# Patient Record
Sex: FEMALE | Race: WHITE | NOT HISPANIC OR LATINO | Employment: OTHER | URBAN - METROPOLITAN AREA
[De-identification: names, ages, dates, MRNs, and addresses within clinical notes are randomized per-mention and may not be internally consistent; named-entity substitution may affect disease eponyms.]

---

## 2017-11-01 ENCOUNTER — OFFICE VISIT (OUTPATIENT)
Dept: ORTHOPEDIC SURGERY | Facility: CLINIC | Age: 67
End: 2017-11-01

## 2017-11-01 DIAGNOSIS — G89.29 CHRONIC PAIN OF BOTH KNEES: Primary | ICD-10-CM

## 2017-11-01 DIAGNOSIS — M25.512 CHRONIC PAIN OF BOTH SHOULDERS: ICD-10-CM

## 2017-11-01 DIAGNOSIS — M25.562 CHRONIC PAIN OF BOTH KNEES: Primary | ICD-10-CM

## 2017-11-01 DIAGNOSIS — G89.29 CHRONIC PAIN OF BOTH SHOULDERS: ICD-10-CM

## 2017-11-01 DIAGNOSIS — M25.511 CHRONIC PAIN OF BOTH SHOULDERS: ICD-10-CM

## 2017-11-01 DIAGNOSIS — M25.561 CHRONIC PAIN OF BOTH KNEES: Primary | ICD-10-CM

## 2017-11-01 PROCEDURE — 73030 X-RAY EXAM OF SHOULDER: CPT | Performed by: ORTHOPAEDIC SURGERY

## 2017-11-01 PROCEDURE — 99204 OFFICE O/P NEW MOD 45 MIN: CPT | Performed by: ORTHOPAEDIC SURGERY

## 2017-11-01 NOTE — PROGRESS NOTES
Chief Complaint   Patient presents with   • Left Shoulder - Establish Care   • Right Shoulder - Establish Care     Chief complaint: Patient is complaining of bilateral shoulder pain and discomfort.  She is also complaining of bilateral knee pain and discomfort.        HPI The patient is here today for right and shoulder pain and discomfort.  Ration states that she's been having pain and discomfort in both shoulders for about 6 years.  The symptoms are progressively worse on the left side over the past 2-3 months.  She describes her pain as a constantly present dull ache which never really goes away.  She has recently experienced a sharp stabbing character to the pain with difficulty in abduction.  She has difficulty in sleeping in bed at night.  She does recall that she was working out in the gym on a rowing machine and since that time her left shoulder has become a lot worse.  She finds it difficult to abduct her arm at the shoulder and to hold it in that position for any length of time.  Patient is also complaining of bilateral knee pain.  She has difficulty going up and down the steps.  Occasionally the knees will swell and then she finds it difficult to squat on the ground.  Walking on an inclined surface is painful for the patient.  She has a sense of early morning stiffness with both her knees.  At this point she states that she is involved in an exercise program which is helping her knees.  She cannot walk briskly or run because of the knee pain.  She would like to continue with conservative, nonoperative care both the knees.           No Known Allergies      Social History     Social History   • Marital status:      Spouse name: N/A   • Number of children: N/A   • Years of education: N/A     Occupational History   • Not on file.     Social History Main Topics   • Smoking status: Former Smoker   • Smokeless tobacco: Not on file   • Alcohol use 3.6 oz/week     6 Glasses of wine per week      Comment: 6  days per week   • Drug use: Not on file   • Sexual activity: Not on file     Other Topics Concern   • Not on file     Social History Narrative   • No narrative on file       Family History   Problem Relation Age of Onset   • No Known Problems Mother    • No Known Problems Father        Past Surgical History:   Procedure Laterality Date   • COMBINED HYSTERECTOMY VAGINAL / OOPHORECTOMY / A&P REPAIR     • MASTECTOMY Bilateral 2007       Past Medical History:   Diagnosis Date   • Breast cancer 2002, 2007    Lumpectomy with radiation and chemotherapy on the left followed by bilateral mastectomy 2007           There were no vitals filed for this visit.          Review of Systems   Constitutional: Negative.    HENT: Negative.    Eyes: Negative.    Respiratory: Negative.    Cardiovascular: Positive for leg swelling.   Gastrointestinal: Negative.    Endocrine: Negative.    Genitourinary: Negative.    Musculoskeletal: Positive for gait problem and joint swelling.   Skin: Negative.    Allergic/Immunologic: Negative.    Hematological: Negative.    Psychiatric/Behavioral: Negative.            Physical Exam   Constitutional: She is oriented to person, place, and time. She appears well-developed.   HENT:   Head: Normocephalic.   Eyes: Conjunctivae are normal. Pupils are equal, round, and reactive to light.   Neck: Neck supple.   Cardiovascular: Normal rate and intact distal pulses.    Pulmonary/Chest: Breath sounds normal.   Abdominal: Soft. Bowel sounds are normal.   Musculoskeletal: She exhibits edema and tenderness. She exhibits no deformity.   Neurological: She is alert and oriented to person, place, and time. She has normal reflexes.   Skin: Skin is dry.   Psychiatric: She has a normal mood and affect. Her behavior is normal. Judgment and thought content normal.   Nursing note and vitals reviewed.              Joint/Body Part Specific Exam:  bilateral shoulder. The shoulder is extremely tender anteriorly. There is tenderness  over the insertion of the rotator cuff over the greater tuberosity of the humerus. Slight proximal migration of the humeral head is noted. AC joint is tender. Crossover adduction test is positive. Drop arm sign is positive. Forward flexion is 0-120° degrees, abduction is 0-140° degrees, external rotation is 0-30° degrees. Patient has mild atrophy both of the supra and infraspinatus fossa. Sulcus sign is negative. There is no evidence of multidirectional instability. Neer test is positive on compression. Skin and soft tissue tenderness is noted. Patient’s strength in abduction and external rotation are extremely lacking. The pain level is 6.  There are no signs of multidirectional instability.  Apprehension sign is negative.  His symptoms are much worse on the left side compared to the right side.    bilateral knee. Patient has crepitus throughout range of motion. Positive patellar grind test. Mild effusion. Lachman is negative. Pivot shift is negative. Anterior and posterior drawer signs are negative. Significant joint line tenderness is noted on the medial aspect of the knee. Patient has a varus orientation of the knee. Range of motion in flexion is for 0- 120° degrees. Neurovascular status intact.  Dorsalis pedis and posterior tibial artery pulses are palpable. Common peroneal nerve function is well preserved. Patients gait is cautious and antalgic. Skin and soft tissues are swollen; consistent with synovitis and effusion.  Patient has pain and discomfort when she first starts to walk and gets out of the chair from a seated position.      X-RAY Report:    bilateral Shoulder X-Ray  Indication: Pain with limited range of motion both shoulders after an injury to the left shoulder and the gym.  AP and Lateral  Findings: Narrowing of the acromioclavicular joint space with sclerosis over the greater tuberosity of the humerus  no bony lesion  Soft tissues within normal limits  decreased joint spaces  Hardware  appropriately positioned not applicable      no prior studies available for comparison.    X-RAY was ordered and reviewed by Dean Trinidad MD            Diagnostics:            Prema was seen today for establish care and establish care.    Diagnoses and all orders for this visit:    Chronic pain of both knees  -     Cancel: XR Knee 3 View Bilateral    Chronic pain of both shoulders  -     XR Shoulder 2+ View Bilateral  -     MRI Shoulder Left Without Contrast; Future            Procedures          I provided this patient with educational materials regarding exercise and bone health.        Plan:   Stretching and strengthening exercises of the quads and the hamstrings to improve the range of motion and the strength in both knees.    Use a supportive brace to the knee to prevent it from buckling and giving out.    Tablet calcium and vitamin D for bone health.    Tablet ibuprofen 600 mg orally twice a day for pain and discomfort.    Schedule an MRI of the left shoulder for evaluation of intra-articular pathology such as a rotator cuff tear.    Once the MRI has been obtained will be able to make a decision for the internal surgical intervention.  If she does have a rotator cuff tear or a glenoid labrum tear she might be a candidate for an arthroscopic surgery and repair.    If she has only a partial thickness tear or signs of impingement on the MRI then she can be treated with conservative, nonoperative care.    She is not a candidate for total knee arthroplasty at this point in time to treat her with nonoperative care for the knee and discomfort.    Follow-up in my office in 3-4 weeks for reevaluation and discussion with the patient about the reports of the MRI.            CC To No Known Provider

## 2017-11-07 ENCOUNTER — TELEPHONE (OUTPATIENT)
Dept: ORTHOPEDIC SURGERY | Facility: CLINIC | Age: 67
End: 2017-11-07

## 2017-11-22 ENCOUNTER — OFFICE VISIT (OUTPATIENT)
Dept: ORTHOPEDIC SURGERY | Facility: CLINIC | Age: 67
End: 2017-11-22

## 2017-11-22 VITALS — HEIGHT: 65 IN | TEMPERATURE: 98.4 F | BODY MASS INDEX: 25.99 KG/M2 | WEIGHT: 156 LBS

## 2017-11-22 DIAGNOSIS — M25.511 CHRONIC PAIN OF BOTH SHOULDERS: Primary | ICD-10-CM

## 2017-11-22 DIAGNOSIS — G89.29 CHRONIC PAIN OF BOTH SHOULDERS: Primary | ICD-10-CM

## 2017-11-22 DIAGNOSIS — M25.512 CHRONIC PAIN OF BOTH SHOULDERS: Primary | ICD-10-CM

## 2017-11-22 PROCEDURE — 99213 OFFICE O/P EST LOW 20 MIN: CPT | Performed by: ORTHOPAEDIC SURGERY

## 2017-11-22 PROCEDURE — 20610 DRAIN/INJ JOINT/BURSA W/O US: CPT | Performed by: ORTHOPAEDIC SURGERY

## 2017-11-22 RX ORDER — ESOMEPRAZOLE MAGNESIUM 40 MG/1
40 CAPSULE, DELAYED RELEASE ORAL
COMMUNITY
Start: 2013-10-12 | End: 2022-08-29 | Stop reason: ALTCHOICE

## 2017-11-22 RX ORDER — LEVOCETIRIZINE DIHYDROCHLORIDE 5 MG/1
TABLET, FILM COATED ORAL
Refills: 2 | COMMUNITY
Start: 2017-10-24 | End: 2020-08-20

## 2017-11-22 RX ADMIN — LIDOCAINE HYDROCHLORIDE 2 ML: 10 INJECTION, SOLUTION INFILTRATION; PERINEURAL at 09:34

## 2017-11-22 RX ADMIN — METHYLPREDNISOLONE ACETATE 160 MG: 80 INJECTION, SUSPENSION INTRA-ARTICULAR; INTRALESIONAL; INTRAMUSCULAR; SOFT TISSUE at 09:34

## 2017-11-22 RX ADMIN — METHYLPREDNISOLONE ACETATE 160 MG: 80 INJECTION, SUSPENSION INTRA-ARTICULAR; INTRALESIONAL; INTRAMUSCULAR; SOFT TISSUE at 09:31

## 2017-11-22 RX ADMIN — LIDOCAINE HYDROCHLORIDE 2 ML: 10 INJECTION, SOLUTION INFILTRATION; PERINEURAL at 09:31

## 2017-11-22 NOTE — PROGRESS NOTES
Chief Complaint   Patient presents with   • Left Shoulder - Follow-up   • Right Shoulder - Follow-up           HPI patient presents to the office today for follow-up on bilateral shoulder pain. MRI results of the left shoulder. Patient is having increasing pain and discomfort in both shoulders.  She has weakness in abduction with difficulty in sleeping in bed at night.  Cross body activities bother her significantly.  She states that carrying heavy weights such as full grocery bags, bother her significantly.  She has had a cervical spine fusion by Dr. William Whiting.  She states that there is some overlap all her symptoms from the C-spine into both her shoulders.  She states that her quality of life is quite negative at this point and she would like some relief on her shoulder symptoms.  She is also complaining of locking and triggering of the right third and fourth digits.  I have discussed the pathology of locking trigger digit with the patient.  The decision has been made to undergo surgical release of the A1 pulley of the third and fourth digits on the right side.  The patient is going to think about it and let me know when she wants to proceed with a surgical intervention.        Vitals:    11/22/17 0916   Temp: 98.4 °F (36.9 °C)           Review of Systems   Constitutional: Negative.    HENT: Negative.    Eyes: Negative.    Respiratory: Negative.    Cardiovascular: Negative.    Gastrointestinal: Negative.    Endocrine: Negative.    Genitourinary: Negative.    Musculoskeletal: Negative.    Skin: Negative.    Allergic/Immunologic: Negative.    Neurological: Negative.    Hematological: Negative.    Psychiatric/Behavioral: Negative.            Physical Exam   Constitutional: She appears well-nourished.   HENT:   Head: Atraumatic.   Eyes: EOM are normal.   Neck: Neck supple.   Cardiovascular: Normal rate, regular rhythm, normal heart sounds and intact distal pulses.    Pulmonary/Chest: Effort normal and breath sounds  normal.   Abdominal: Bowel sounds are normal.   Musculoskeletal: She exhibits edema, tenderness and deformity.   Neurological: She is alert. She has normal reflexes.   Skin: Skin is dry.   Psychiatric: She has a normal mood and affect. Her behavior is normal. Judgment and thought content normal.   Nursing note and vitals reviewed.          Joint/Body Part Specific Exam:  bilateral Shoulder. Patient has signs of impingement with internal and external rotation. There is a lot of pain and tenderness for the patient over the subcromial bursa. Hodgson’s sign is positive. Neer sign is positive. Forward flexion is 0-110° degrees, abduction is 0-120° degrees, external rotation is 0-30° degrees. Rotator cuff function is fairly well preserved except for the impingement at 90 degrees. Apprehension sign is negative. Axillary nerve function is well preserved. Radial artery pulses are palpable. There is no evidence of multidirectional instability. Sulcus sign is negative. Drop arm sign is negative. The patient has some ill-defined tenderness over the greater tuberosity of the humerus. The pain level is 6.  The symptoms are much worse on the left side compared to the right side.  She has a lot of tenderness over the lateral end of the clavicle and over the footprint of the rotator cuff on the greater tuberosity of the humerus.    right wrist. Trigger right third and fourth digits. Skin is swollen over volar aspect of MCP joint. Flexor and extensor tendon functions are both well maintained. Tender nodule over the volar aspect of MCP joint. Capillary refill is two seconds with a brisk return. Triggering is passively correctable. Median nerve function is normal. Radial artery pulse is palpable.  X-RAY Report:        Diagnostics:  MRI reports from [facility] and opened MRI shows a partial thickness tear on the bursal surface of the distal supraspinatus involving 50% of the tendon thickness.  There is some subacromial bursitis.  There  is moderate acromioclavicular joint arthritis.  There is some tendinitis of the rotator cuff musculature but no full-thickness tear.  There is some chondral thinning of the medial aspect of the humeral head and the central glenoid consistent with osteoarthritis.  These images are discussed with the patient in great detail.      Prema was seen today for follow-up and follow-up.    Diagnoses and all orders for this visit:    Chronic pain of both shoulders  -     Large Joint Arthrocentesis  -     Large Joint Arthrocentesis          Large Joint Arthrocentesis  Date/Time: 11/22/2017 9:31 AM  Consent given by: patient  Site marked: site marked  Timeout: Immediately prior to procedure a time out was called to verify the correct patient, procedure, equipment, support staff and site/side marked as required   Supporting Documentation  Indications: pain   Procedure Details  Location: shoulder - R subacromial bursa  Preparation: Patient was prepped and draped in the usual sterile fashion  Needle size: 25 G  Approach: anteromedial  Medications administered: 2 mL lidocaine 1 %; 160 mg methylPREDNISolone acetate 80 MG/ML  Patient tolerance: patient tolerated the procedure well with no immediate complications    Large Joint Arthrocentesis  Date/Time: 11/22/2017 9:34 AM  Consent given by: patient  Site marked: site marked  Timeout: Immediately prior to procedure a time out was called to verify the correct patient, procedure, equipment, support staff and site/side marked as required   Supporting Documentation  Indications: pain   Procedure Details  Location: shoulder - L subacromial bursa  Preparation: Patient was prepped and draped in the usual sterile fashion  Needle size: 25 G  Approach: anteromedial  Medications administered: 2 mL lidocaine 1 %; 160 mg methylPREDNISolone acetate 80 MG/ML  Patient tolerance: patient tolerated the procedure well with no immediate complications              Plan:  Injected patient's right shoulder  with a steroid from an anterolateral approach.    Injected patient's left shoulder with a steroid from an anterolateral approach.    Stretching and strengthening exercises of both shoulders to prevent a frozen shoulder.    Tablet ibuprofen 600 mg orally twice a day for pain and discomfort.    Patient will follow up with her cervical spine surgeon to resolve any issues related to the radiculopathy from the cervical spine.    Discussed with the patient about the reports of her MRI and the surgical intervention in the form of a shoulder arthroscopy and a mini open rotator cuff repair on the left shoulder if her symptoms get any worse.    Discussed with the patient about surgical release of the A1 pulley of the third and fourth digits of the right hand.  Patient will let me know when she is ready for the surgical intervention.    Risks of surgical procedure, possibility of infection, DVT, continued pain, limited strength and range of motion, neurological deficit, scar tissue formation, recurrence of triggering and further surgical intervention discussed with the patient. Patient understands that surgery will benefit triggering.

## 2017-11-29 RX ORDER — LIDOCAINE HYDROCHLORIDE 10 MG/ML
2 INJECTION, SOLUTION INFILTRATION; PERINEURAL
Status: COMPLETED | OUTPATIENT
Start: 2017-11-22 | End: 2017-11-22

## 2017-11-29 RX ORDER — METHYLPREDNISOLONE ACETATE 80 MG/ML
160 INJECTION, SUSPENSION INTRA-ARTICULAR; INTRALESIONAL; INTRAMUSCULAR; SOFT TISSUE
Status: COMPLETED | OUTPATIENT
Start: 2017-11-22 | End: 2017-11-22

## 2018-03-14 ENCOUNTER — OFFICE VISIT (OUTPATIENT)
Dept: ORTHOPEDIC SURGERY | Facility: CLINIC | Age: 68
End: 2018-03-14

## 2018-03-14 VITALS — BODY MASS INDEX: 25.83 KG/M2 | TEMPERATURE: 98.1 F | HEIGHT: 65 IN | WEIGHT: 155 LBS

## 2018-03-14 DIAGNOSIS — M25.511 CHRONIC PAIN OF BOTH SHOULDERS: Primary | ICD-10-CM

## 2018-03-14 DIAGNOSIS — M75.101 ROTATOR CUFF TEAR ARTHROPATHY OF RIGHT SHOULDER: ICD-10-CM

## 2018-03-14 DIAGNOSIS — M12.811 ROTATOR CUFF TEAR ARTHROPATHY OF RIGHT SHOULDER: ICD-10-CM

## 2018-03-14 DIAGNOSIS — M12.812 LEFT ROTATOR CUFF TEAR ARTHROPATHY: ICD-10-CM

## 2018-03-14 DIAGNOSIS — G89.29 CHRONIC PAIN OF BOTH SHOULDERS: Primary | ICD-10-CM

## 2018-03-14 DIAGNOSIS — M75.102 LEFT ROTATOR CUFF TEAR ARTHROPATHY: ICD-10-CM

## 2018-03-14 DIAGNOSIS — M25.512 CHRONIC PAIN OF BOTH SHOULDERS: Primary | ICD-10-CM

## 2018-03-14 PROCEDURE — 99213 OFFICE O/P EST LOW 20 MIN: CPT | Performed by: ORTHOPAEDIC SURGERY

## 2018-03-14 PROCEDURE — 20610 DRAIN/INJ JOINT/BURSA W/O US: CPT | Performed by: ORTHOPAEDIC SURGERY

## 2018-03-14 RX ORDER — VIT C/B6/B5/MAGNESIUM/HERB 173 50-5-6-5MG
1 CAPSULE ORAL DAILY
COMMUNITY

## 2018-03-14 RX ADMIN — LIDOCAINE HYDROCHLORIDE 2 ML: 10 INJECTION, SOLUTION EPIDURAL; INFILTRATION; INTRACAUDAL; PERINEURAL at 12:43

## 2018-03-14 RX ADMIN — METHYLPREDNISOLONE ACETATE 160 MG: 80 INJECTION, SUSPENSION INTRA-ARTICULAR; INTRALESIONAL; INTRAMUSCULAR; SOFT TISSUE at 12:43

## 2018-03-14 RX ADMIN — LIDOCAINE HYDROCHLORIDE 2 ML: 10 INJECTION, SOLUTION EPIDURAL; INFILTRATION; INTRACAUDAL; PERINEURAL at 12:42

## 2018-03-14 RX ADMIN — METHYLPREDNISOLONE ACETATE 160 MG: 80 INJECTION, SUSPENSION INTRA-ARTICULAR; INTRALESIONAL; INTRAMUSCULAR; SOFT TISSUE at 12:42

## 2018-03-14 NOTE — PROGRESS NOTES
Prema Hoang  ?  1950  ?  Chief Complaint   Patient presents with   • Right Shoulder - Follow-up, Pain   • Left Shoulder - Follow-up, Pain     ?  HPI  Patient is here today for bilateral shoulder Pain and discomfort.  She has difficulty in sleeping in bed at night.  Cross body activities bothers the patient significantly.  She finds it difficult to lift heavy objects into the overhead position.  The patient states that her symptoms are getting progressively worse.  She does have quite a bit of pain and discomfort at night and her sleep patterns are interrupted by the pain and discomfort.  She has noticed a significant atrophy of the supraspinatus musculature.  I have discussed with her at length the treatment options going forwards.  Eventually, she will need reverse total shoulder arthroplasty but at this point she would like to continue with nonoperative care for as long as possible.  I certainly agree with a conservative approach until such time that her symptoms have progressed significantly.  Also her disease needs to be more advanced both clinically and radiologically before I would recommend surgical intervention.    Review of Systems   Constitutional: Negative.    HENT: Negative.    Eyes: Negative.    Respiratory: Negative.    Cardiovascular: Negative.    Gastrointestinal: Negative.    Endocrine: Negative.    Genitourinary: Negative.    Musculoskeletal: Positive for arthralgias and myalgias.   Skin: Negative.    Allergic/Immunologic: Negative.    Neurological: Negative.    Hematological: Negative.    Psychiatric/Behavioral: Negative.    All other systems reviewed and are negative.      Joint/Body Part Specific Exam:  bilateral shoulder. Rotator cuff function is extremely impaired. There is a high riding humeral head with articulation of the humerus to the undersurface of the acromiohumeral articulation. AC joint is exquisitely tender and painful for patient. Axillary nerve function is well  preserved. There is significant winging of the scapula with hollowing of the fossae over the proximal and distal aspects of the spine of the scapula. Forward flexion is 0-100 degrees, active abduction is 0-90 degrees. Drop arm sign is positive. External rotation against resistance is extremely painful and limited for the patient. Skin and soft tissues are essentially normal other than some amounts of swelling. The pain level is 5    X Ray Report:    Diagnostics:    Large Joint Arthrocentesis  Date/Time: 3/14/2018 12:42 PM  Consent given by: patient  Site marked: site marked  Timeout: Immediately prior to procedure a time out was called to verify the correct patient, procedure, equipment, support staff and site/side marked as required   Supporting Documentation  Indications: pain   Procedure Details  Location: shoulder - R subacromial bursa  Preparation: Patient was prepped and draped in the usual sterile fashion  Needle size: 25 G  Approach: posterior  Medications administered: 160 mg methylPREDNISolone acetate 80 MG/ML; 2 mL lidocaine PF 1% 1 %  Patient tolerance: patient tolerated the procedure well with no immediate complications    Large Joint Arthrocentesis  Date/Time: 3/14/2018 12:43 PM  Consent given by: patient  Site marked: site marked  Timeout: Immediately prior to procedure a time out was called to verify the correct patient, procedure, equipment, support staff and site/side marked as required   Supporting Documentation  Indications: pain   Procedure Details  Location: shoulder - L subacromial bursa  Preparation: Patient was prepped and draped in the usual sterile fashion  Needle size: 25 G  Approach: posterior  Medications administered: 160 mg methylPREDNISolone acetate 80 MG/ML; 2 mL lidocaine PF 1% 1 %  Patient tolerance: patient tolerated the procedure well with no immediate complications        ?  ?  Plan    · Ice pack for 48 hrs    · Injected patients right lateral shoulder joints into the  subacromial bursa with steroid.     · ace wrap for swelling PRN    · Elevations for swelling PRN    · Tablet Ibuprofen 600 mg P.O. BID x 5 Days with meals    · Call office for any problems  With procedure    · Home Physical Therapy Program discussed with patient    · F/U in 3 months for Re-evaluation.  ·   · Home-based exercise program including the use of an overhead pulley to minimize the possibility of adhesive capsulitis and a frozen shoulder.

## 2018-03-21 PROBLEM — M75.102 LEFT ROTATOR CUFF TEAR ARTHROPATHY: Status: ACTIVE | Noted: 2018-03-21

## 2018-03-21 PROBLEM — M12.811 ROTATOR CUFF TEAR ARTHROPATHY OF RIGHT SHOULDER: Status: ACTIVE | Noted: 2018-03-21

## 2018-03-21 PROBLEM — M75.101 ROTATOR CUFF TEAR ARTHROPATHY OF RIGHT SHOULDER: Status: ACTIVE | Noted: 2018-03-21

## 2018-03-21 PROBLEM — M12.812 LEFT ROTATOR CUFF TEAR ARTHROPATHY: Status: ACTIVE | Noted: 2018-03-21

## 2018-03-21 RX ORDER — METHYLPREDNISOLONE ACETATE 80 MG/ML
160 INJECTION, SUSPENSION INTRA-ARTICULAR; INTRALESIONAL; INTRAMUSCULAR; SOFT TISSUE
Status: COMPLETED | OUTPATIENT
Start: 2018-03-14 | End: 2018-03-14

## 2018-03-21 RX ORDER — LIDOCAINE HYDROCHLORIDE 10 MG/ML
2 INJECTION, SOLUTION EPIDURAL; INFILTRATION; INTRACAUDAL; PERINEURAL
Status: COMPLETED | OUTPATIENT
Start: 2018-03-14 | End: 2018-03-14

## 2018-06-20 ENCOUNTER — CLINICAL SUPPORT (OUTPATIENT)
Dept: ORTHOPEDIC SURGERY | Facility: CLINIC | Age: 68
End: 2018-06-20

## 2018-06-20 DIAGNOSIS — M25.511 CHRONIC PAIN OF BOTH SHOULDERS: ICD-10-CM

## 2018-06-20 DIAGNOSIS — G89.29 CHRONIC PAIN OF BOTH SHOULDERS: ICD-10-CM

## 2018-06-20 DIAGNOSIS — M12.811 ROTATOR CUFF TEAR ARTHROPATHY OF RIGHT SHOULDER: Primary | ICD-10-CM

## 2018-06-20 DIAGNOSIS — M75.101 ROTATOR CUFF TEAR ARTHROPATHY OF RIGHT SHOULDER: Primary | ICD-10-CM

## 2018-06-20 DIAGNOSIS — M25.512 CHRONIC PAIN OF BOTH SHOULDERS: ICD-10-CM

## 2018-06-20 PROCEDURE — 20610 DRAIN/INJ JOINT/BURSA W/O US: CPT | Performed by: ORTHOPAEDIC SURGERY

## 2018-06-20 PROCEDURE — 99213 OFFICE O/P EST LOW 20 MIN: CPT | Performed by: ORTHOPAEDIC SURGERY

## 2018-06-20 RX ADMIN — METHYLPREDNISOLONE ACETATE 160 MG: 80 INJECTION, SUSPENSION INTRA-ARTICULAR; INTRALESIONAL; INTRAMUSCULAR; SOFT TISSUE at 10:44

## 2018-06-20 RX ADMIN — LIDOCAINE HYDROCHLORIDE 2 ML: 10 INJECTION, SOLUTION EPIDURAL; INFILTRATION; INTRACAUDAL; PERINEURAL at 10:44

## 2018-06-20 NOTE — PROGRESS NOTES
Prema Hoang  ?  1950  ?  Chief Complaint   Patient presents with   • Right Shoulder - Follow-up, Pain   • Left Shoulder - Follow-up, Pain     ?  HPI the patient is here today for right and left shoulder pain and discomfort. She has a lot of nighttime pain and discomfort.  The patient has weakness in abduction and cannot reach into the overhead position without difficulty.  She states that turning over in bed at night is painful for her.  She denies any recent history of trauma.  She states that the right side is more symptomatic than the left.  Cross body activities bother her very significantly.  The patient notices that she has been developing atrophy of the soft tissues and the muscles proximal to the spine of the scapula over the past 3-6 months.  Her history and clinical symptoms are consistent with advancing cuff tear arthropathy of both the shoulders.    Physical Exam   Constitutional: She is oriented to person, place, and time. She appears well-nourished.   HENT:   Head: Atraumatic.   Eyes: EOM are normal.   Neck: Neck supple.   Cardiovascular: Normal rate, regular rhythm and normal heart sounds.    Pulmonary/Chest: Breath sounds normal.   Abdominal: Bowel sounds are normal.   Musculoskeletal: She exhibits edema and tenderness.   Neurological: She is alert and oriented to person, place, and time.   Skin: Skin is dry. Capillary refill takes 2 to 3 seconds.   Psychiatric: She has a normal mood and affect. Her behavior is normal. Judgment and thought content normal.   Nursing note and vitals reviewed.      Review of Systems   Constitutional: Negative.    HENT: Negative.    Eyes: Negative.    Respiratory: Negative.    Cardiovascular: Negative.    Gastrointestinal: Negative.    Endocrine: Negative.    Genitourinary: Negative.    Musculoskeletal: Positive for joint swelling.   Skin: Negative.    Allergic/Immunologic: Negative.    Neurological: Negative.    Hematological: Negative.     Psychiatric/Behavioral: Negative.        Joint/Body Part Specific Exam:   bilateral shoulder. Rotator cuff function is extremely impaired. There is a high riding humeral head with articulation of the humerus to the undersurface of the acromiohumeral articulation. AC joint is exquisitely tender and painful for patient. Axillary nerve function is well preserved. There is significant winging of the scapula with hollowing of the fossae over the proximal and distal aspects of the spine of the scapula. Forward flexion is 0-130 degrees, active abduction is 0-90 degrees. Drop arm sign is positive. External rotation against resistance is extremely painful and limited for the patient. Skin and soft tissues are essentially normal other than some amounts of swelling. The pain level is 6    X-Ray Report:    Diagnostics:    Large Joint Arthrocentesis  Date/Time: 6/20/2018 10:44 AM  Consent given by: patient  Site marked: site marked  Timeout: Immediately prior to procedure a time out was called to verify the correct patient, procedure, equipment, support staff and site/side marked as required   Supporting Documentation  Indications: pain   Procedure Details  Location: shoulder - R subacromial bursa  Preparation: Patient was prepped and draped in the usual sterile fashion  Needle size: 25 G  Approach: anteromedial  Medications administered: 160 mg methylPREDNISolone acetate 80 MG/ML; 2 mL lidocaine PF 1% 1 %  Patient tolerance: patient tolerated the procedure well with no immediate complications    Large Joint Arthrocentesis  Date/Time: 6/20/2018 10:44 AM  Consent given by: patient  Site marked: site marked  Timeout: Immediately prior to procedure a time out was called to verify the correct patient, procedure, equipment, support staff and site/side marked as required   Supporting Documentation  Indications: pain   Procedure Details  Location: shoulder - L subacromial bursa  Preparation: Patient was prepped and draped in the  usual sterile fashion  Needle size: 25 G  Approach: anteromedial  Medications administered: 160 mg methylPREDNISolone acetate 80 MG/ML; 2 mL lidocaine PF 1% 1 %  Patient tolerance: patient tolerated the procedure well with no immediate complications        ?  ?  Plan      · Ice pack for 48 hrs     · Injected patients Bilateral shoulder joints with Steroid    · Ace wrap for swelling PRN    · Elevation for swelling PRN    · Tablet Ibuprofen 600 mg P.O. BID x 5 Days with meals    · Call office for any problems  With procedure    · Home Physical Therapy Program discussed with patient    · F/U in 3 months for Re-evaluation.  ·   · Continue with a home exercise program including using an overhead pulley to prevent arthrofibrosis of the shoulder.

## 2018-06-24 RX ORDER — LIDOCAINE HYDROCHLORIDE 10 MG/ML
2 INJECTION, SOLUTION EPIDURAL; INFILTRATION; INTRACAUDAL; PERINEURAL
Status: COMPLETED | OUTPATIENT
Start: 2018-06-20 | End: 2018-06-20

## 2018-06-24 RX ORDER — METHYLPREDNISOLONE ACETATE 80 MG/ML
160 INJECTION, SUSPENSION INTRA-ARTICULAR; INTRALESIONAL; INTRAMUSCULAR; SOFT TISSUE
Status: COMPLETED | OUTPATIENT
Start: 2018-06-20 | End: 2018-06-20

## 2018-10-02 ENCOUNTER — TELEPHONE (OUTPATIENT)
Dept: ORTHOPEDIC SURGERY | Facility: CLINIC | Age: 68
End: 2018-10-02

## 2018-10-02 NOTE — TELEPHONE ENCOUNTER
Patient wants to know if [facility] can recommend a PCP. She lives in the [facility]. She does not care for her PCP.

## 2018-10-05 NOTE — TELEPHONE ENCOUNTER
I have returned the patients call and advised her that [facility] now has a patient appointment liaison that can help find the patient a PCP. The telephone number is [phone]. Patient voiced understanding and was very thankful.

## 2018-10-11 ENCOUNTER — CLINICAL SUPPORT (OUTPATIENT)
Dept: ORTHOPEDIC SURGERY | Facility: CLINIC | Age: 68
End: 2018-10-11

## 2018-10-11 DIAGNOSIS — M54.9 CHRONIC BACK PAIN, UNSPECIFIED BACK LOCATION, UNSPECIFIED BACK PAIN LATERALITY: ICD-10-CM

## 2018-10-11 DIAGNOSIS — M25.511 CHRONIC PAIN OF BOTH SHOULDERS: Primary | ICD-10-CM

## 2018-10-11 DIAGNOSIS — M25.512 CHRONIC PAIN OF BOTH SHOULDERS: Primary | ICD-10-CM

## 2018-10-11 DIAGNOSIS — G89.29 CHRONIC PAIN OF BOTH SHOULDERS: Primary | ICD-10-CM

## 2018-10-11 DIAGNOSIS — G89.29 CHRONIC BACK PAIN, UNSPECIFIED BACK LOCATION, UNSPECIFIED BACK PAIN LATERALITY: ICD-10-CM

## 2018-10-11 PROCEDURE — 20610 DRAIN/INJ JOINT/BURSA W/O US: CPT | Performed by: ORTHOPAEDIC SURGERY

## 2018-10-11 PROCEDURE — 99213 OFFICE O/P EST LOW 20 MIN: CPT | Performed by: ORTHOPAEDIC SURGERY

## 2018-10-11 RX ADMIN — METHYLPREDNISOLONE ACETATE 160 MG: 80 INJECTION, SUSPENSION INTRA-ARTICULAR; INTRALESIONAL; INTRAMUSCULAR; SOFT TISSUE at 12:49

## 2018-10-11 RX ADMIN — LIDOCAINE HYDROCHLORIDE 2 ML: 10 INJECTION, SOLUTION EPIDURAL; INFILTRATION; INTRACAUDAL; PERINEURAL at 12:49

## 2018-10-11 NOTE — PROGRESS NOTES
Prema Hoang  ?  1950  ?  Chief Complaint   Patient presents with   • Right Shoulder - Follow-up   • Left Shoulder - Follow-up     ?  HPI the patient is here today for a follow up of her right and left shoulder.  The patient has pain and discomfort in both her shoulders.  She has difficulty in reaching into the overhead position.  She is developing progressive atrophy of the supra and the infraspinatus muscles.  She finds it difficult to sleep in bed at night because of the pain and discomfort.  The patient understands the pathology of rotator cuff arthropathy.  She understands that eventually she is going to require reverse total shoulder arthroplasty and we have discussed that with the patient at length.  She is also having a lot of pain and discomfort in the lumbar spine.  That pain is associated with numbness and tingling which radiates down both the lower extremities.  The patient is requesting a referral to a spinal specialist and I will be glad to do that for her.    Physical Exam   Constitutional: She appears well-nourished.   HENT:   Nose: Nose normal.   Eyes: Pupils are equal, round, and reactive to light. Conjunctivae are normal.   Neck: Neck supple.   Cardiovascular: Normal rate and regular rhythm.    Pulmonary/Chest: Effort normal.   Abdominal: Soft.   Musculoskeletal: She exhibits edema and tenderness.   Neurological: She is alert.   Skin: Skin is warm. Capillary refill takes 2 to 3 seconds.   Psychiatric: She has a normal mood and affect. Her behavior is normal. Thought content normal.   Nursing note and vitals reviewed.      Review of Systems   Constitutional: Negative.    HENT: Negative.    Eyes: Negative.    Respiratory: Negative.    Cardiovascular: Negative.    Gastrointestinal: Negative.    Endocrine: Negative.    Genitourinary: Negative.    Musculoskeletal: Positive for joint swelling.   Skin: Negative.    Allergic/Immunologic: Negative.    Neurological: Negative.    Hematological:  Negative.    Psychiatric/Behavioral: Negative.        Joint/Body Part Specific Exam:   bilateral shoulder. Rotator cuff function is extremely impaired. There is a high riding humeral head with articulation of the humerus to the undersurface of the acromiohumeral articulation. AC joint is exquisitely tender and painful for patient. Axillary nerve function is well preserved. There is significant winging of the scapula with hollowing of the fossae over the proximal and distal aspects of the spine of the scapula. Forward flexion is 0-30 degrees, active abduction is 0-60 degrees. Drop arm sign is positive. External rotation against resistance is extremely painful and limited for the patient. Skin and soft tissues are essentially normal other than some amounts of swelling. The pain level is 6.    Lumbar Spine: The overlying skin and soft tissues are mildly swollen. Deep tendon reflexes are bilaterally, symmetric and equal.  No long tract signs are noted. There is No evidence of myelopathy. No bowel or bladder deficit noted. Mild spasm of erector spinae muscle is noted. bilaterally sided rotation is associated with pain and discomfort. Straight leg raise test is positive to 60 degrees. Contralateral straight leg raise is negative. Pain radiates to buttock and thigh. Get up and go is slow due to the lumbar pain.No objective motor or sensory function loss is noted.     X-Ray Report:    Diagnostics:    Large Joint Arthrocentesis  Date/Time: 10/11/2018 12:49 PM  Consent given by: patient  Site marked: site marked  Timeout: Immediately prior to procedure a time out was called to verify the correct patient, procedure, equipment, support staff and site/side marked as required   Supporting Documentation  Indications: pain   Procedure Details  Location: shoulder - L subacromial bursa  Preparation: Patient was prepped and draped in the usual sterile fashion  Needle size: 25 G  Approach: anteromedial  Medications administered: 160 mg  methylPREDNISolone acetate 80 MG/ML; 2 mL lidocaine PF 1% 1 %  Patient tolerance: patient tolerated the procedure well with no immediate complications    Large Joint Arthrocentesis  Date/Time: 10/11/2018 12:49 PM  Consent given by: patient  Site marked: site marked  Timeout: Immediately prior to procedure a time out was called to verify the correct patient, procedure, equipment, support staff and site/side marked as required   Supporting Documentation  Indications: pain   Procedure Details  Location: shoulder - R subacromial bursa  Preparation: Patient was prepped and draped in the usual sterile fashion  Needle size: 25 G  Approach: anteromedial  Medications administered: 160 mg methylPREDNISolone acetate 80 MG/ML; 2 mL lidocaine PF 1% 1 %  Patient tolerance: patient tolerated the procedure well with no immediate complications        ?  ?  Plan      · Ice pack for 48 hrs     · Injected patients bilateral shoulder joints with Steroid    · Ace wrap for swelling PRN    · Elevation for swelling PRN    · Tablet Ibuprofen 600 mg P.O. BID x 5 Days with meals    · Call office for any problems  With procedure    · Home Physical Therapy Program discussed with patient    · F/U in 3 months for Re-evaluation.  ·   · Refer the patient to my partner Dr. ZULY SMITH for chronic low back pain.  ·   · The patient is also asking if we can refer her to [facility] P[facility] and we will try and find a suitable physician for her primary care needs.

## 2018-10-27 PROBLEM — M54.9 CHRONIC BACK PAIN: Status: ACTIVE | Noted: 2018-10-27

## 2018-10-27 PROBLEM — G89.29 CHRONIC BACK PAIN: Status: ACTIVE | Noted: 2018-10-27

## 2018-10-27 RX ORDER — METHYLPREDNISOLONE ACETATE 80 MG/ML
160 INJECTION, SUSPENSION INTRA-ARTICULAR; INTRALESIONAL; INTRAMUSCULAR; SOFT TISSUE
Status: COMPLETED | OUTPATIENT
Start: 2018-10-11 | End: 2018-10-11

## 2018-10-27 RX ORDER — LIDOCAINE HYDROCHLORIDE 10 MG/ML
2 INJECTION, SOLUTION EPIDURAL; INFILTRATION; INTRACAUDAL; PERINEURAL
Status: COMPLETED | OUTPATIENT
Start: 2018-10-11 | End: 2018-10-11

## 2018-11-14 ENCOUNTER — TELEPHONE (OUTPATIENT)
Dept: ORTHOPEDIC SURGERY | Facility: CLINIC | Age: 68
End: 2018-11-14

## 2018-11-14 ENCOUNTER — CONSULT (OUTPATIENT)
Dept: ORTHOPEDIC SURGERY | Facility: CLINIC | Age: 68
End: 2018-11-14

## 2018-11-14 VITALS — TEMPERATURE: 97.8 F | WEIGHT: 154 LBS | HEIGHT: 64 IN | BODY MASS INDEX: 26.29 KG/M2

## 2018-11-14 DIAGNOSIS — M54.50 LUMBAR SPINE PAIN: Primary | ICD-10-CM

## 2018-11-14 DIAGNOSIS — M48.062 SPINAL STENOSIS OF LUMBAR REGION WITH NEUROGENIC CLAUDICATION: ICD-10-CM

## 2018-11-14 PROCEDURE — 72100 X-RAY EXAM L-S SPINE 2/3 VWS: CPT | Performed by: ORTHOPAEDIC SURGERY

## 2018-11-14 PROCEDURE — 99213 OFFICE O/P EST LOW 20 MIN: CPT | Performed by: ORTHOPAEDIC SURGERY

## 2018-11-14 RX ORDER — METHYLPREDNISOLONE 4 MG/1
TABLET ORAL
Qty: 21 TABLET | Refills: 0 | Status: SHIPPED | OUTPATIENT
Start: 2018-11-14 | End: 2020-08-20

## 2018-11-14 RX ORDER — METHYLPREDNISOLONE 4 MG/1
TABLET ORAL
Qty: 21 TABLET | Refills: 0 | Status: SHIPPED | OUTPATIENT
Start: 2018-11-14 | End: 2018-11-14 | Stop reason: SDUPTHER

## 2018-11-14 NOTE — PROGRESS NOTES
New patient or new problem visit    Chief Complaint   Patient presents with   • Lumbar Spine - Pain       HPI: She complains of left hip pain worse when sitting and driving ongoing for 2 months.  She does some light exercises at home.  The pains intermittent stabbing.  [facility] she underwent lumbar surgery which she states made her better.  She notes occasional pain radiation to the heel.    PFSH: See chart- reviewed    Review of Systems   Constitutional: Negative for chills, fever and unexpected weight change.   HENT: Negative for trouble swallowing and voice change.    Eyes: Negative for visual disturbance.   Respiratory: Negative for cough and shortness of breath.    Cardiovascular: Negative for chest pain and leg swelling.   Gastrointestinal: Negative for abdominal pain, nausea and vomiting.   Endocrine: Negative for cold intolerance and heat intolerance.   Genitourinary: Negative for difficulty urinating, frequency and urgency.   Skin: Negative for rash and wound.   Allergic/Immunologic: Negative for immunocompromised state.   Neurological: Negative for weakness and numbness.   Hematological: Does not bruise/bleed easily.   Psychiatric/Behavioral: Positive for sleep disturbance (difficulty sleeping). Negative for dysphoric mood. The patient is not nervous/anxious.        PE: Constitutional: Vital signs above-noted.  Awake, alert and oriented    Psychiatric: Affect and insight do not appear grossly disturbed.    Pulmonary: Breathing is unlabored, color is good.    Skin: Warm, dry and normal turgor    Cardiac:  Some.  No edema.    Eyesight and hearing appear adequate for examination purposes      Musculoskeletal:  There is some tenderness to percussion and palpation of the spine. Motion appears undisturbed.  Posture is unremarkable to coronal and sagittal inspection.    The skin about the area is intact.  There is no palpable or visible deformity.  There is no local spasm.       Neurologic:   Reflexes are 2+  and symmetrical in the patellae and achilles.   Motor function is undisturbed in quadriceps, EHL, and gastrocnemius      Sensation appears symmetrically intact to light touch   .  In the bilateral lower extremities there is no evidence of atrophy.   Clonus is absent..  Gait appears undisturbed.  SLR test negative      MEDICAL DECISION MAKING    XRAY: Plain film x-rays of the lumbar spine show lumbosacral disc space narrowing and fairly well-preserved posture.  No comparison views are available.    Other: n/a    Impression: Sciatica R spinal stenosis    Plan: [facility] when necessary follow-up

## 2018-11-20 ENCOUNTER — TREATMENT (OUTPATIENT)
Dept: PHYSICAL THERAPY | Facility: CLINIC | Age: 68
End: 2018-11-20

## 2018-11-20 DIAGNOSIS — M54.50 LUMBAR SPINE PAIN: Primary | ICD-10-CM

## 2018-11-20 PROCEDURE — G8980 MOBILITY D/C STATUS: HCPCS | Performed by: PHYSICAL THERAPIST

## 2018-11-20 PROCEDURE — 97162 PT EVAL MOD COMPLEX 30 MIN: CPT | Performed by: PHYSICAL THERAPIST

## 2018-11-20 PROCEDURE — G8978 MOBILITY CURRENT STATUS: HCPCS | Performed by: PHYSICAL THERAPIST

## 2018-11-20 PROCEDURE — 97110 THERAPEUTIC EXERCISES: CPT | Performed by: PHYSICAL THERAPIST

## 2018-11-20 PROCEDURE — 97530 THERAPEUTIC ACTIVITIES: CPT | Performed by: PHYSICAL THERAPIST

## 2018-11-20 PROCEDURE — 97140 MANUAL THERAPY 1/> REGIONS: CPT | Performed by: PHYSICAL THERAPIST

## 2018-11-20 NOTE — PROGRESS NOTES
Orthopedic / Sports / Industrial Physical Therapy  Physical Therapy Initial Evaluation and Plan of Care    Patient Name: Prema Hoang          :  1950  Referring Physician: Connor Burnham MD  Diagnosis: Lumbar spine pain [M54.5]  ; SI Jt Dysfunction   Date of Evaluation: 2018  ______________________________________________________________________    Subjective Evaluation    History of Present Illness  Onset date: A couple months ago   Mechanism of injury: Insidious onset - Lumbar Sx  (unknown what was done)    Pain  Current pain ratin  At worst pain ratin  Location: (L) LB / LSS - Denies LE radculopathy   Quality: Ache; Stabbing   Relieving factors: change in position  Exacerbated by: Driving; Sitting prolonged periods - Up stairs; bending fwd.  Progression: improved    Social Support  Patient lives at: One story home with basement-     Diagnostic Tests  X-ray: abnormal (See OV notes - DDD lumbar spine)    Treatments  Current treatment: medication  Current treatment comments: Self-stretching.        ___________________________________________________  Objective       Postural Observations    Additional Postural Observation Details  (L) Ilium / ASIS / PSIS higher vs (R);  Hyperlordosis    Palpation     Additional Palpation Details  Tender (L) SI region    Active Range of Motion     Additional Active Range of Motion Details  Lumbar Flexion: Full                Ext:       Full                SB (R): 3/4 norm w/ pain (L) SI reg                      (L):  1/2 norm w/ pain (L) SI reg    Strength/Myotome Testing     Additional Strength Details  (B) LE Myotomes WNL    Tests     Additional Tests Details  SLR (-)  (+) SI Jt Dysfunction / Upshear (L)       See Treatment Flow sheet for Exercises, Manual therapy, and modalities.   FUNCTIONAL ACTIVITIES: X 10 min  · TAPING / BRACING: NA  · Jt protection, ADL modification; Posture and      ___________________________________________________  Assessment & Plan     Assessment  Assessment details: Low Back Pain; SI Jt Dysfunction (L)    PROBLEMS: Pain; Limited mobility; Intolerance to ADL's and hobby activities; Postural dysfunction  PROGNOSIS: Good    GOALS:   SHORT TERM GOALS: 2 weeks:  1) HEP Initiated; 2) Pain decreased 50%:   3) AROM  increased:  4) Improved functional ability grossly;     LONG TERM GOALS: 4 weeks (or at time of DISCHARGE): 1) (I) HEP; 2) AROM WFL and pain free; 3) Strength / mobility to be able to perform all ADL's and job-related activities w/o restrictions;       Plan  Planned therapy interventions: abdominal trunk stabilization, body mechanics training, flexibility, home exercise program, joint mobilization, manual therapy, neuromuscular re-education, postural training, soft tissue mobilization, spinal/joint mobilization, strengthening, stretching and therapeutic activities (Modalities prn; Taping / bracing; )  Frequency: 2x week  Duration in weeks: 4  Treatment plan discussed with: patient    ___________________________________________________  Manual Therapy:    10     mins  32673;   Therapeutic Exercise:    15     mins  58035;     Neuromuscular Tapan:        mins  49424;   Therapeutic Activity:     10     mins  09182;     Ultrasound:     10     mins  47741;    Electrical Stimulation:   20     mins  50665 ( );  Dry Needling          mins self-pay   Gait Training:          mins  13252;  EVAL TIME:   20    Timed Treatment:   45   mins                Total Treatment:     65   mins    PT SIGNATURE:   Noel Slaughter, PT  DATE TREATMENT INITIATED: 11/20/2018  ___________________________________________________  Initial Certification  Certification Period: 2/18/2019  I certify that the therapy services are furnished while this patient is under my care.  The services outlined above are required by this patient, and will be reviewed every 90 days.     PHYSICIAN:  ________________________________  DATE: ______  Connor Burnham MD        Please sign and return via fax to [phone].. Thank you, [facility].  ______________________________________________________________________  [address] [facility] [place] [address]  Phone: [phone] Fax: [phone]    Access Code: [email or web address]   URL: [email or web address]   Date: 11/20/2018   Prepared by: Noel Slaughter     Exercises  Supine Single Knee to Chest Stretch - 3-5 reps - 1 sets - 30s hold - 2x daily - 7x weekly  Supine Piriformis Stretch with Leg Straight - 3-5 reps - 1 sets - 30s hold - 2x daily - 7x weekly  Supine Lower Trunk Rotation - 10 reps - 1 sets - 10s hold - 3x daily - 7x weekly  Supine Posterior Pelvic Tilt - 30 reps - 1 sets - 10s hold - 1x daily - 7x weekly  Supine Bridge - 15 reps - 1-2 sets - 5s hold - 1x daily - 3-5x weekly

## 2018-11-27 ENCOUNTER — TREATMENT (OUTPATIENT)
Dept: PHYSICAL THERAPY | Facility: CLINIC | Age: 68
End: 2018-11-27

## 2018-11-27 DIAGNOSIS — M54.50 LUMBAR SPINE PAIN: Primary | ICD-10-CM

## 2018-11-27 DIAGNOSIS — M53.3 SACROILIAC JOINT DYSFUNCTION: ICD-10-CM

## 2018-11-27 PROCEDURE — 97110 THERAPEUTIC EXERCISES: CPT | Performed by: PHYSICAL THERAPIST

## 2018-11-27 PROCEDURE — 97530 THERAPEUTIC ACTIVITIES: CPT | Performed by: PHYSICAL THERAPIST

## 2018-11-27 PROCEDURE — 97140 MANUAL THERAPY 1/> REGIONS: CPT | Performed by: PHYSICAL THERAPIST

## 2018-11-28 ENCOUNTER — OFFICE VISIT (OUTPATIENT)
Dept: ORTHOPEDIC SURGERY | Facility: CLINIC | Age: 68
End: 2018-11-28

## 2018-11-28 VITALS — TEMPERATURE: 97.7 F | HEIGHT: 65 IN | BODY MASS INDEX: 25.83 KG/M2 | WEIGHT: 155 LBS

## 2018-11-28 DIAGNOSIS — M12.811 ROTATOR CUFF TEAR ARTHROPATHY OF BOTH SHOULDERS: ICD-10-CM

## 2018-11-28 DIAGNOSIS — M25.561 BILATERAL CHRONIC KNEE PAIN: Primary | ICD-10-CM

## 2018-11-28 DIAGNOSIS — M25.562 BILATERAL CHRONIC KNEE PAIN: Primary | ICD-10-CM

## 2018-11-28 DIAGNOSIS — G89.29 BILATERAL CHRONIC KNEE PAIN: Primary | ICD-10-CM

## 2018-11-28 DIAGNOSIS — M75.101 ROTATOR CUFF TEAR ARTHROPATHY OF BOTH SHOULDERS: ICD-10-CM

## 2018-11-28 DIAGNOSIS — M12.812 ROTATOR CUFF TEAR ARTHROPATHY OF BOTH SHOULDERS: ICD-10-CM

## 2018-11-28 DIAGNOSIS — M75.102 ROTATOR CUFF TEAR ARTHROPATHY OF BOTH SHOULDERS: ICD-10-CM

## 2018-11-28 PROCEDURE — 99213 OFFICE O/P EST LOW 20 MIN: CPT | Performed by: ORTHOPAEDIC SURGERY

## 2018-11-28 PROCEDURE — 73560 X-RAY EXAM OF KNEE 1 OR 2: CPT | Performed by: ORTHOPAEDIC SURGERY

## 2018-12-03 NOTE — PROGRESS NOTES
Physical Therapy Daily Progress Note    Patient Name: Prema Hoang         :  1950  Referring Physician: Connor Burnham MD    Subjective   Prema Hoang reports: feeling much better since last session with decreased pain and improved mobility and function -   Notes pain (L) LSS/SI region w/ LTR and prolonged walking today -    Objective   (L) Ilium / ASIS / PSIS higher vs (R), but much less vs initial eval -   Tender (L) SI region -   Near full lumbar flexion- Limited and painful ext and SB to (L) -   (+) SI Jt Dysfunction / Upshear (L) -     See Exercise, Manual, and Modality Logs for complete treatment.     Functional / Therapeutic Activities:  10 min  · TAPING / BRACING: NA  · SEE EXERCISE FLOW SHEET -   · Jt protection, ADL modification; Posture and      Assessment/Plan  Low Back Pain; SI Jt Dysfunction (L)  Much improved with decreased pain and improved mobility and function -     Progress strengthening /stabilization /functional activity       _________________________________________________  Manual Therapy:    10     mins  41955;  Therapeutic Exercise:    25     mins  99509;     Neuromuscular Tapan:        mins  60331;    Therapeutic Activity:     10     mins  27553;     Gait Training:           mins  13273;     Ultrasound:     10     mins  03830;    Electrical Stimulation:    20     mins  19910 ( );  Dry Needling          mins self-pay    Timed Treatment:   55   mins                  Total Treatment:     75   mins    Noel Slaughter PT  Physical Therapist

## 2018-12-17 ENCOUNTER — CLINICAL SUPPORT (OUTPATIENT)
Dept: ORTHOPEDIC SURGERY | Facility: CLINIC | Age: 68
End: 2018-12-17

## 2018-12-17 DIAGNOSIS — M25.562 BILATERAL CHRONIC KNEE PAIN: Primary | ICD-10-CM

## 2018-12-17 DIAGNOSIS — G89.29 BILATERAL CHRONIC KNEE PAIN: Primary | ICD-10-CM

## 2018-12-17 DIAGNOSIS — M25.561 BILATERAL CHRONIC KNEE PAIN: Primary | ICD-10-CM

## 2018-12-17 PROCEDURE — 99214 OFFICE O/P EST MOD 30 MIN: CPT | Performed by: ORTHOPAEDIC SURGERY

## 2018-12-17 PROCEDURE — 20610 DRAIN/INJ JOINT/BURSA W/O US: CPT | Performed by: ORTHOPAEDIC SURGERY

## 2018-12-17 RX ORDER — CEFAZOLIN SODIUM 2 G/100ML
2 INJECTION, SOLUTION INTRAVENOUS ONCE
Status: CANCELLED | OUTPATIENT
Start: 2019-03-06 | End: 2018-12-17

## 2018-12-17 RX ORDER — METHYLPREDNISOLONE ACETATE 80 MG/ML
160 INJECTION, SUSPENSION INTRA-ARTICULAR; INTRALESIONAL; INTRAMUSCULAR; SOFT TISSUE
Status: COMPLETED | OUTPATIENT
Start: 2018-12-17 | End: 2018-12-17

## 2018-12-17 RX ORDER — LIDOCAINE HYDROCHLORIDE 10 MG/ML
2 INJECTION, SOLUTION EPIDURAL; INFILTRATION; INTRACAUDAL; PERINEURAL
Status: COMPLETED | OUTPATIENT
Start: 2018-12-17 | End: 2018-12-17

## 2018-12-17 RX ADMIN — METHYLPREDNISOLONE ACETATE 160 MG: 80 INJECTION, SUSPENSION INTRA-ARTICULAR; INTRALESIONAL; INTRAMUSCULAR; SOFT TISSUE at 13:18

## 2018-12-17 RX ADMIN — METHYLPREDNISOLONE ACETATE 160 MG: 80 INJECTION, SUSPENSION INTRA-ARTICULAR; INTRALESIONAL; INTRAMUSCULAR; SOFT TISSUE at 13:17

## 2018-12-17 RX ADMIN — LIDOCAINE HYDROCHLORIDE 2 ML: 10 INJECTION, SOLUTION EPIDURAL; INFILTRATION; INTRACAUDAL; PERINEURAL at 13:17

## 2018-12-17 RX ADMIN — LIDOCAINE HYDROCHLORIDE 2 ML: 10 INJECTION, SOLUTION EPIDURAL; INFILTRATION; INTRACAUDAL; PERINEURAL at 13:18

## 2018-12-17 NOTE — PROGRESS NOTES
Prema Hoang  ?  1950  ?  Chief Complaint   Patient presents with   • Right Knee - Follow-up   • Left Knee - Follow-up     ?  HPI  Patient returns for injections in both her knees today.  We were able to work her in the schedule today in between surgery cases. Her knee pain became severe after a shopping trip 3 days ago. Pain in right knee is greater than the left. She states by the end of the trip she could not walk due to the pain. She also reports there being swelling after the increased activity. Xrays of both knees were obtained at last visit, in November. She reports pain in the medial aspect and posteriorly in both knees. Her pain is rated as severe. She has used aleve and ice to help relieve her symptoms. We have discussed doing TKA for this upcoming spring.     Physical Exam   Constitutional: She is oriented to person, place, and time. She appears well-developed and well-nourished. No distress.   HENT:   Head: Normocephalic and atraumatic.   Eyes: Conjunctivae and EOM are normal. Pupils are equal, round, and reactive to light.   Neck: Normal range of motion.   Cardiovascular: Normal rate and regular rhythm.   Pulmonary/Chest: Effort normal and breath sounds normal.   Musculoskeletal: She exhibits edema (swelling of right knee medial aspect).   Neurological: She is alert and oriented to person, place, and time.   Skin: Skin is warm and dry. Capillary refill takes less than 2 seconds.   Psychiatric: She has a normal mood and affect. Her behavior is normal. Judgment and thought content normal.        Review of Systems   Constitutional: Negative.    HENT: Negative.    Eyes: Negative.    Respiratory: Negative.    Cardiovascular: Negative.    Musculoskeletal: Positive for arthralgias (right and left knee) and joint swelling (right knee).   Skin: Negative.    Allergic/Immunologic: Negative.    Neurological: Negative.    Hematological: Negative.    Psychiatric/Behavioral: Negative.        Joint/Body Part  Specific Exam:   Bilateral knees. Patient has crepitus throughout range of motion. Positive patellar grind test. Mild effusion. Lachman is negative. Pivot shift is negative. Anterior and posterior drawer signs are negative. Significant joint line tenderness is noted on the medial aspect of the knee. Patient has a varus orientation of the knee. There is fullness and tenderness in the Popliteal fossa. Mild distention of a Popliteal cyst is noted in this location. Range of motion in flexion is from 0- 120 degrees. Neurovascular status is intact.  Dorsalis pedis and posterior tibial artery pulses are palpable. Common peroneal nerve function is well preserved. Patient's gait is cautious and antalgic. Skin and soft tissues are mildly swollen, consistent with synovitis and effusion. The patient has a significant limp with the first few steps after starting the gait cycle. Getting out of a chair takes a lot of effort due to pain on knee flexion.    X-Ray Report:    Diagnostics:    Large Joint Arthrocentesis: R knee  Date/Time: 12/17/2018 1:17 PM  Consent given by: patient  Site marked: site marked  Timeout: Immediately prior to procedure a time out was called to verify the correct patient, procedure, equipment, support staff and site/side marked as required   Supporting Documentation  Indications: pain   Procedure Details  Location: knee - R knee  Preparation: Patient was prepped and draped in the usual sterile fashion  Needle size: 25 G  Approach: anteromedial  Medications administered: 160 mg methylPREDNISolone acetate 80 MG/ML; 2 mL lidocaine PF 1% 1 %  Patient tolerance: patient tolerated the procedure well with no immediate complications    Large Joint Arthrocentesis: L knee  Date/Time: 12/17/2018 1:18 PM  Consent given by: patient  Site marked: site marked  Timeout: Immediately prior to procedure a time out was called to verify the correct patient, procedure, equipment, support staff and site/side marked as required    Supporting Documentation  Indications: pain   Procedure Details  Location: knee - L knee  Preparation: Patient was prepped and draped in the usual sterile fashion  Needle size: 25 G  Approach: anteromedial  Medications administered: 160 mg methylPREDNISolone acetate 80 MG/ML; 2 mL lidocaine PF 1% 1 %  Patient tolerance: patient tolerated the procedure well with no immediate complications        ?  ?  Plan      · Ice pack for 48 hrs     · Injected right knee and left knee with steroid    · Ace wrap for swelling PRN    · Elevation for swelling PRN    · Tablet Ibuprofen 600 mg P.O. BID x 5 Days with meals    · Call office for any problems with procedure    · Home Physical Therapy Program discussed with patient    The patient was seen today for preoperative discussion of Right TKA.  The patient has been tried on over-the-counter and prescription NSAID's despite the risks of anti-inflammatory bleeding, peptic ulcers and erosive gastritis with short term benefit only.  Braces have been prescribed for mechanical support.  Patient has been participating in an exercise program specifically targeting joint pain relief with limited benefit. Intraarticular injections have been used periodically with some but not complete relief of pain.  Ambulation aids have also been utilized.      · The details of the surgical procedure were explained including the location of probable incisions and a description of the likely hardware/grafts to be used. The patient understands the likely convalescence after surgery as well as the rehabilitation required.  Also, we have thoroughly discussed with the patient the risks, benefits and alternatives to surgery.  Risks include but are not limited to the risk of infection, joint stiffness, limited range of motion, wound healing problems, scar tissue build up, myocardial infarction, stroke, blood clots (including DVT and/or pulmonary embolus along with the risk of death) neurologic and/or vascular  injury, limb length discrepancy, fracture, dislocation, nonunion, malunion, continued pain and need for further surgery including hardware failure requiring revision.     · F/U in 3 months for Re-evaluation

## 2018-12-18 NOTE — ADDENDUM NOTE
Addended by: NAKITA GARCÍA on: 12/17/2018 07:21 PM     Modules accepted: Orders, Level of Service, SmartSet

## 2019-01-23 ENCOUNTER — CLINICAL SUPPORT (OUTPATIENT)
Dept: ORTHOPEDIC SURGERY | Facility: CLINIC | Age: 69
End: 2019-01-23

## 2019-01-23 DIAGNOSIS — M12.811 ROTATOR CUFF TEAR ARTHROPATHY OF BOTH SHOULDERS: Primary | ICD-10-CM

## 2019-01-23 DIAGNOSIS — M25.512 CHRONIC PAIN OF BOTH SHOULDERS: ICD-10-CM

## 2019-01-23 DIAGNOSIS — G89.29 CHRONIC PAIN OF BOTH SHOULDERS: ICD-10-CM

## 2019-01-23 DIAGNOSIS — M25.511 CHRONIC PAIN OF BOTH SHOULDERS: ICD-10-CM

## 2019-01-23 DIAGNOSIS — M12.812 ROTATOR CUFF TEAR ARTHROPATHY OF BOTH SHOULDERS: Primary | ICD-10-CM

## 2019-01-23 DIAGNOSIS — M75.101 ROTATOR CUFF TEAR ARTHROPATHY OF BOTH SHOULDERS: Primary | ICD-10-CM

## 2019-01-23 DIAGNOSIS — M75.102 ROTATOR CUFF TEAR ARTHROPATHY OF BOTH SHOULDERS: Primary | ICD-10-CM

## 2019-01-23 PROCEDURE — 20610 DRAIN/INJ JOINT/BURSA W/O US: CPT | Performed by: ORTHOPAEDIC SURGERY

## 2019-01-23 RX ADMIN — METHYLPREDNISOLONE ACETATE 160 MG: 80 INJECTION, SUSPENSION INTRA-ARTICULAR; INTRALESIONAL; INTRAMUSCULAR; SOFT TISSUE at 10:54

## 2019-01-23 RX ADMIN — LIDOCAINE HYDROCHLORIDE 2 ML: 20 INJECTION, SOLUTION EPIDURAL; INFILTRATION; INTRACAUDAL; PERINEURAL at 10:54

## 2019-01-23 RX ADMIN — METHYLPREDNISOLONE ACETATE 160 MG: 80 INJECTION, SUSPENSION INTRA-ARTICULAR; INTRALESIONAL; INTRAMUSCULAR; SOFT TISSUE at 10:53

## 2019-01-23 RX ADMIN — LIDOCAINE HYDROCHLORIDE 2 ML: 20 INJECTION, SOLUTION EPIDURAL; INFILTRATION; INTRACAUDAL; PERINEURAL at 10:53

## 2019-01-23 NOTE — PROGRESS NOTES
Prema Hoang  ?  1950  ?  Chief Complaint   Patient presents with   • Right Shoulder - Follow-up   • Left Shoulder - Follow-up     ?  HPI   The patient is here today for a follow up of her right and left shoulder. She is here for injection only. She continues to have pain in both shoulders, left side more than the right. Her pain makes it difficult to sleep at night at times. She also has intermittent difficulty reaching into the overhead position.     Physical Exam    Review of Systems    Joint/Body Part Specific Exam:   bilateral shoulder. Rotator cuff function is extremely impaired. There is a high riding humeral head with articulation of the humerus to the undersurface of the acromiohumeral articulation. AC joint is exquisitely tender and painful for patient. Axillary nerve function is well preserved. There is significant winging of the scapula with hollowing of the fossae over the proximal and distal aspects of the spine of the scapula. Forward flexion is 0-30 degrees, active abduction is 0-60 degrees. Drop arm sign is positive. External rotation against resistance is extremely painful and limited for the patient. Skin and soft tissues are essentially normal other than some amounts of swelling. The pain level is 5      X-Ray Report:    Diagnostics:    Prema was seen today for follow-up and follow-up.    Diagnoses and all orders for this visit:    Rotator cuff tear arthropathy of both shoulders    Chronic pain of both shoulders          Large Joint Arthrocentesis: L subacromial bursa  Date/Time: 1/23/2019 10:53 AM  Consent given by: patient  Site marked: site marked  Timeout: Immediately prior to procedure a time out was called to verify the correct patient, procedure, equipment, support staff and site/side marked as required   Supporting Documentation  Indications: pain   Procedure Details  Location: shoulder - L subacromial bursa  Preparation: Patient was prepped and draped in the usual sterile  fashion  Needle size: 25 G  Approach: anteromedial  Medications administered: 2 mL lidocaine PF 2% 2 %; 160 mg methylPREDNISolone acetate 80 MG/ML  Patient tolerance: patient tolerated the procedure well with no immediate complications    Large Joint Arthrocentesis: R glenohumeral  Date/Time: 1/23/2019 10:54 AM  Consent given by: patient  Site marked: site marked  Timeout: Immediately prior to procedure a time out was called to verify the correct patient, procedure, equipment, support staff and site/side marked as required   Supporting Documentation  Indications: pain   Procedure Details  Location: shoulder - R glenohumeral  Preparation: Patient was prepped and draped in the usual sterile fashion  Needle size: 25 G  Approach: posterior  Medications administered: 2 mL lidocaine PF 2% 2 %; 160 mg methylPREDNISolone acetate 80 MG/ML  Patient tolerance: patient tolerated the procedure well with no immediate complications        ?  ?  Plan      · Ice pack for 48 hrs     · Injected patient's right and left shoulder joints with steroid    · Elevation for swelling PRN    · Ibuprofen 600mg every 6-8 hours PRN pain    · Call office for any problems with procedure    · F/U in 3 months for Re-evaluation

## 2019-01-25 RX ORDER — LIDOCAINE HYDROCHLORIDE 20 MG/ML
2 INJECTION, SOLUTION EPIDURAL; INFILTRATION; INTRACAUDAL; PERINEURAL
Status: COMPLETED | OUTPATIENT
Start: 2019-01-23 | End: 2019-01-23

## 2019-01-25 RX ORDER — METHYLPREDNISOLONE ACETATE 80 MG/ML
160 INJECTION, SUSPENSION INTRA-ARTICULAR; INTRALESIONAL; INTRAMUSCULAR; SOFT TISSUE
Status: COMPLETED | OUTPATIENT
Start: 2019-01-23 | End: 2019-01-23

## 2019-02-28 ENCOUNTER — TELEPHONE (OUTPATIENT)
Dept: ORTHOPEDIC SURGERY | Facility: CLINIC | Age: 69
End: 2019-02-28

## 2019-02-28 DIAGNOSIS — M48.061 SPINAL STENOSIS OF LUMBAR REGION, UNSPECIFIED WHETHER NEUROGENIC CLAUDICATION PRESENT: Primary | ICD-10-CM

## 2019-02-28 NOTE — TELEPHONE ENCOUNTER
Patient says that she is still  having issues with her back and would like for JW to order an MRI for her. Patient says that she is currently in [place] and would be back on Wed. 3/6, but will only be in town for 2 weeks if one could be scheldued in that time frame.

## 2019-03-11 ENCOUNTER — HOSPITAL ENCOUNTER (OUTPATIENT)
Dept: MRI IMAGING | Facility: HOSPITAL | Age: 69
Discharge: HOME | End: 2019-03-11
Admitting: ORTHOPAEDIC SURGERY

## 2019-03-11 DIAGNOSIS — M48.061 SPINAL STENOSIS OF LUMBAR REGION, UNSPECIFIED WHETHER NEUROGENIC CLAUDICATION PRESENT: ICD-10-CM

## 2019-03-11 PROCEDURE — 72148 MRI LUMBAR SPINE W/O DYE: CPT

## 2019-03-12 ENCOUNTER — HOSPITAL ENCOUNTER (OUTPATIENT)
Dept: MRI IMAGING | Facility: HOSPITAL | Age: 69
End: 2019-03-12

## 2019-03-22 ENCOUNTER — TELEPHONE (OUTPATIENT)
Dept: ORTHOPEDIC SURGERY | Facility: CLINIC | Age: 69
End: 2019-03-22

## 2019-03-22 DIAGNOSIS — M48.061 SPINAL STENOSIS OF LUMBAR REGION, UNSPECIFIED WHETHER NEUROGENIC CLAUDICATION PRESENT: Primary | ICD-10-CM

## 2019-03-22 NOTE — TELEPHONE ENCOUNTER
Per JGW, MRI shows minimal evidence of stenosis, nothing for which he wants to rush into surgery.  He recommends physical therapy and/or epidural steroid injections.  Spoke with patient and informed; she would like to try epidurals instead of PT.  They have been order and she is aware someone will call her to set them up,

## 2019-04-24 ENCOUNTER — CLINICAL SUPPORT (OUTPATIENT)
Dept: ORTHOPEDIC SURGERY | Facility: CLINIC | Age: 69
End: 2019-04-24

## 2019-04-24 VITALS — TEMPERATURE: 97.7 F | WEIGHT: 149 LBS | BODY MASS INDEX: 24.83 KG/M2 | HEIGHT: 65 IN

## 2019-04-24 DIAGNOSIS — M75.101 ROTATOR CUFF TEAR ARTHROPATHY OF BOTH SHOULDERS: ICD-10-CM

## 2019-04-24 DIAGNOSIS — M12.811 ROTATOR CUFF TEAR ARTHROPATHY OF BOTH SHOULDERS: ICD-10-CM

## 2019-04-24 DIAGNOSIS — G89.29 BILATERAL CHRONIC KNEE PAIN: ICD-10-CM

## 2019-04-24 DIAGNOSIS — M25.511 BILATERAL SHOULDER PAIN, UNSPECIFIED CHRONICITY: Primary | ICD-10-CM

## 2019-04-24 DIAGNOSIS — M25.561 BILATERAL CHRONIC KNEE PAIN: ICD-10-CM

## 2019-04-24 DIAGNOSIS — M12.812 ROTATOR CUFF TEAR ARTHROPATHY OF BOTH SHOULDERS: ICD-10-CM

## 2019-04-24 DIAGNOSIS — M25.562 BILATERAL CHRONIC KNEE PAIN: ICD-10-CM

## 2019-04-24 DIAGNOSIS — M75.102 ROTATOR CUFF TEAR ARTHROPATHY OF BOTH SHOULDERS: ICD-10-CM

## 2019-04-24 DIAGNOSIS — M25.512 BILATERAL SHOULDER PAIN, UNSPECIFIED CHRONICITY: Primary | ICD-10-CM

## 2019-04-24 PROCEDURE — 99213 OFFICE O/P EST LOW 20 MIN: CPT | Performed by: ORTHOPAEDIC SURGERY

## 2019-04-24 PROCEDURE — 20610 DRAIN/INJ JOINT/BURSA W/O US: CPT | Performed by: ORTHOPAEDIC SURGERY

## 2019-04-24 RX ORDER — METHYLPREDNISOLONE ACETATE 80 MG/ML
80 INJECTION, SUSPENSION INTRA-ARTICULAR; INTRALESIONAL; INTRAMUSCULAR; SOFT TISSUE
Status: COMPLETED | OUTPATIENT
Start: 2019-04-24 | End: 2019-04-24

## 2019-04-24 RX ORDER — LIDOCAINE HYDROCHLORIDE 20 MG/ML
4 INJECTION, SOLUTION EPIDURAL; INFILTRATION; INTRACAUDAL; PERINEURAL
Status: COMPLETED | OUTPATIENT
Start: 2019-04-24 | End: 2019-04-24

## 2019-04-24 RX ADMIN — LIDOCAINE HYDROCHLORIDE 4 ML: 20 INJECTION, SOLUTION EPIDURAL; INFILTRATION; INTRACAUDAL; PERINEURAL at 09:44

## 2019-04-24 RX ADMIN — METHYLPREDNISOLONE ACETATE 80 MG: 80 INJECTION, SUSPENSION INTRA-ARTICULAR; INTRALESIONAL; INTRAMUSCULAR; SOFT TISSUE at 09:43

## 2019-04-24 RX ADMIN — METHYLPREDNISOLONE ACETATE 80 MG: 80 INJECTION, SUSPENSION INTRA-ARTICULAR; INTRALESIONAL; INTRAMUSCULAR; SOFT TISSUE at 09:44

## 2019-04-24 RX ADMIN — LIDOCAINE HYDROCHLORIDE 4 ML: 20 INJECTION, SOLUTION EPIDURAL; INFILTRATION; INTRACAUDAL; PERINEURAL at 09:43

## 2019-04-24 NOTE — PROGRESS NOTES
Prema Hoang  ?  1950  ?  Chief Complaint   Patient presents with   • Right Shoulder - Follow-up, Pain   • Left Shoulder - Follow-up, Pain     ?  HPI  Patient is here today for bilateral shoulder pain and discomfort.  The patient states that she is having significant difficulty with both her shoulders.  The left side is not quite as bad as the right side.  She has difficulty in sleeping in bed at night.  On the right side she is unable to lift her shoulder into the abducted position at 90 degrees.  It is my impression that she has a rotator cuff tear which is slowly and progressively moving in the direction of cuff tear arthropathy.  She has difficulty in turning over in bed at night.  She states that she also has bilateral knee pain and discomfort.  There is significant difficulty in going up and down the steps.  The pain is on the medial aspect of the knee and radiates to the proximal tibia.  At her next visit she is requesting evaluation of the knees and possibly a steroid injection as well.        Review of Systems   Constitutional: Negative.    HENT: Negative.    Eyes: Negative.    Respiratory: Negative.    Cardiovascular: Negative.    Gastrointestinal: Negative.    Endocrine: Negative.    Genitourinary: Negative.    Musculoskeletal: Positive for joint swelling.   Skin: Negative.    Allergic/Immunologic: Negative.    Neurological: Negative.    Hematological: Negative.    Psychiatric/Behavioral: Negative.        Joint/Body Part Specific Exam:bilateral shoulder. Rotator cuff function is extremely impaired. There is a high riding humeral head with articulation of the humerus to the undersurface of the acromiohumeral articulation. AC joint is exquisitely tender and painful for patient. Axillary nerve function is well preserved. There is significant winging of the scapula with hollowing of the fossae over the proximal and distal aspects of the spine of the scapula. Forward flexion is 0-30 degrees, active  abduction is 0-60 degrees. Drop arm sign is positive. External rotation against resistance is extremely painful and limited for the patient. Skin and soft tissues are essentially normal other than some amounts of swelling. The pain level is 6.  The symptoms and findings are worse on the right side as compared to the left side.  Apprehension sign is negative.  There is no evidence of multidirectional instability.      X Ray Report:    Diagnostics:    Large Joint Arthrocentesis: R glenohumeral  Date/Time: 4/24/2019 9:43 AM  Consent given by: patient  Site marked: site marked  Timeout: Immediately prior to procedure a time out was called to verify the correct patient, procedure, equipment, support staff and site/side marked as required   Supporting Documentation  Indications: pain   Procedure Details  Location: shoulder - R glenohumeral  Preparation: Patient was prepped and draped in the usual sterile fashion  Needle size: 22 G  Approach: posterior  Medications administered: 4 mL lidocaine PF 2% 2 %; 80 mg methylPREDNISolone acetate 80 MG/ML  Patient tolerance: patient tolerated the procedure well with no immediate complications    Large Joint Arthrocentesis: L subacromial bursa  Date/Time: 4/24/2019 9:44 AM  Consent given by: patient  Site marked: site marked  Timeout: Immediately prior to procedure a time out was called to verify the correct patient, procedure, equipment, support staff and site/side marked as required   Supporting Documentation  Indications: pain   Procedure Details  Location: shoulder - L subacromial bursa  Preparation: Patient was prepped and draped in the usual sterile fashion  Needle size: 25 G  Approach: posterior  Medications administered: 4 mL lidocaine PF 2% 2 %; 80 mg methylPREDNISolone acetate 80 MG/ML  Patient tolerance: patient tolerated the procedure well with no immediate complications        ?  ?  Plan    · Ice pack for 48 hrs    · Injected patients bilateral shoulder joints with  steroid    · ace wrap for swelling PRN    · Elevations for swelling PRN    · Tablet Ibuprofen 600 mg P.O. BID x 5 Days with meals    · Call office for any problems  With procedure    · Home Physical Therapy Program discussed with patient.  ·   · I have discussed with the patient about getting an MRI of the right shoulder for evaluation of the function of the rotator cuff and possible cuff tear arthropathy.  ·   · She states that she will call me back when she is ready for the MRI.  ·   · At her next visit we will see her for both her knees and consider a steroid injection to the knee as well.    · F/U in 3 months for Re-evaluation

## 2019-06-26 ENCOUNTER — CLINICAL SUPPORT (OUTPATIENT)
Dept: ORTHOPEDIC SURGERY | Facility: CLINIC | Age: 69
End: 2019-06-26

## 2019-06-26 VITALS — HEIGHT: 65 IN | BODY MASS INDEX: 25.49 KG/M2 | TEMPERATURE: 98.2 F | WEIGHT: 153 LBS

## 2019-06-26 DIAGNOSIS — M17.0 BILATERAL PRIMARY OSTEOARTHRITIS OF KNEE: Primary | ICD-10-CM

## 2019-06-26 PROCEDURE — 20610 DRAIN/INJ JOINT/BURSA W/O US: CPT | Performed by: ORTHOPAEDIC SURGERY

## 2019-06-26 RX ORDER — METHYLPREDNISOLONE ACETATE 80 MG/ML
160 INJECTION, SUSPENSION INTRA-ARTICULAR; INTRALESIONAL; INTRAMUSCULAR; SOFT TISSUE
Status: COMPLETED | OUTPATIENT
Start: 2019-06-26 | End: 2019-06-26

## 2019-06-26 RX ADMIN — METHYLPREDNISOLONE ACETATE 160 MG: 80 INJECTION, SUSPENSION INTRA-ARTICULAR; INTRALESIONAL; INTRAMUSCULAR; SOFT TISSUE at 10:22

## 2019-06-26 RX ADMIN — METHYLPREDNISOLONE ACETATE 160 MG: 80 INJECTION, SUSPENSION INTRA-ARTICULAR; INTRALESIONAL; INTRAMUSCULAR; SOFT TISSUE at 10:23

## 2019-06-26 NOTE — PROGRESS NOTES
INJECTION    Patient: Prema Hoang    YOB: 1950    MRN: [id number]    Chief Complaints: Bilateral knee pain.  Right worse than left.    History of Present Illness: Patient is seen in the office today for bilateral knee pain. The pain is located at the medial and lateral aspect of the joint.  It has been progressive in nature but remains constant.  Worsened by prolonged standing or walking and squatting activities. Has had improvement in the past with ice, rest, injections. She has had a history of a left patella fracture several years ago.  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  She states that despite the fact that she has a lot of crepitus on the left knee it is actually her right knee that is more symptomatic and painful.  There is no doubt in my mind that she will need a total knee arthroplasty in the near future based on the progression of her symptoms.    This problem is not new to this examiner.     Allergies:   Allergies   Allergen Reactions   • Propoxyphene GI Intolerance       Medications:   Home Medications:  Current Outpatient Medications on File Prior to Visit   Medication Sig   • aspirin 81 MG tablet Take 81 mg by mouth daily.   • Calcium Carb-Cholecalciferol (CALCIUM 600+D3) 600-200 MG-UNIT tablet Take  by mouth daily.   • Cholecalciferol (VITAMIN D3) 2000 UNITS capsule Take 2,000 Units by mouth daily.   • esomeprazole (NEXIUM) 40 MG capsule TAKE 1 CAPSULE BY MOUTH DAILY   • Garlic 100 MG tablet Take  by mouth.   • meloxicam (MOBIC) 15 MG tablet TK 1 T PO  QD   • metoprolol tartrate (LOPRESSOR) 25 MG tablet Take 25 mg by mouth 2 (Two) Times a Day.   • Multiple Vitamin (MULTI-VITAMINS PO) Take  by mouth daily.   • Multiple Vitamins-Minerals (CENTRUM SILVER 50+WOMEN PO) Take  by mouth.   • Probiotic Product (ALIGN PO) Take  by mouth.   • Turmeric 500 MG capsule Take  by mouth.   • denosumab (PROLIA) 60 MG/ML solution syringe Inject  under the skin 1 (One) Time. Every 6  months   • levocetirizine (XYZAL) 5 MG tablet TK 1 T PO D   • MethylPREDNISolone (MEDROL, SHARON,) 4 MG tablet Use as directed by package instructions     No current facility-administered medications on file prior to visit.      Current Medications:  Scheduled Meds:  PRN Meds:.    I have reviewed the patient's medical history in detail and updated the computerized patient record.  Review and summarization of old records include:    Past Medical History:   Diagnosis Date   • Breast cancer (CMS/HCC) 2002, 2007    Lumpectomy with radiation and chemotherapy on the left followed by bilateral mastectomy 2007   • Osteoporosis      Past Surgical History:   Procedure Laterality Date   • BACK SURGERY  2004   • BREAST LUMPECTOMY Left 2002   • COMBINED HYSTERECTOMY VAGINAL / OOPHORECTOMY / A&P REPAIR  2002   • MASTECTOMY Bilateral 2007   • NECK SURGERY  2006     Social History     Occupational History   • Not on file   Tobacco Use   • Smoking status: Former Smoker   • Smokeless tobacco: Never Used   Substance and Sexual Activity   • Alcohol use: Yes     Alcohol/week: 3.6 oz     Types: 6 Glasses of wine per week     Comment: 6 days per week   • Drug use: No   • Sexual activity: Defer      Social History     Social History Narrative   • Not on file     Family History   Problem Relation Age of Onset   • No Known Problems Mother    • Hypertension Father    • Aneurysm Father    • Heart disease Father    • Other Father         mesothelioma   • Cancer Brother         colon, liver       Review of Systems   Constitutional: Negative.    HENT: Negative.    Eyes: Negative.    Respiratory: Negative.    Cardiovascular: Negative.    Gastrointestinal: Negative.    Endocrine: Negative.    Genitourinary: Negative.    Musculoskeletal: Positive for joint swelling.   Skin: Negative.    Allergic/Immunologic: Negative.    Neurological: Negative.    Hematological: Negative.    Psychiatric/Behavioral: Negative.            Wt Readings from Last 3  "Encounters:   06/26/19 69.4 kg (153 lb)   04/24/19 67.6 kg (149 lb)   03/11/19 68 kg (150 lb)     Ht Readings from Last 3 Encounters:   06/26/19 165.1 cm (65\")   04/24/19 165.1 cm (65\")   03/11/19 165 cm (64.96\")     Body mass index is 25.46 kg/m².  Facility age limit for growth percentiles is 20 years.  Vitals:    06/26/19 1020   Temp: 98.2 °F (36.8 °C)       Physical Exam   Constitutional: Oriented to person, place, and time. Appears well-developed and well-nourished.   HENT:   Head: Normocephalic and atraumatic.   Eyes: Conjunctivae and EOM are normal. Pupils are equal, round, and reactive to light.   Cardiovascular: Normal rate, regular rhythm, normal heart sounds and intact distal pulses.   Pulmonary/Chest: Effort normal and breath sounds normal.   Musculoskeletal:   See detailed exam below   Neurological: Alert and oriented to person, place, and time. No sensory deficit. Coordination normal.   Skin: Skin is warm and dry. Capillary refill takes less than 2 seconds. No rash noted. No erythema.   Psychiatric: Normal mood and affect. Her behavior is normal. Judgment and thought content normal.   Nursing note and vitals reviewed.    Musculoskeletal:    Bilateral knee (varus). Patient has crepitus throughout range of motion. Positive patellar grind test. Mild effusion. Lachman is negative. Pivot shift is negative. Anterior and posterior drawer signs are negative. Significant joint line tenderness is noted on the medial aspect of the knee. Patient has a varus orientation of the knee. There is fullness and tenderness in the Popliteal fossa. Mild distention of a Popliteal cyst is noted in this location. Range of motion in flexion is from 0-120 degrees. Neurovascular status is intact.  Dorsalis pedis and posterior tibial artery pulses are palpable. Common peroneal nerve function is well preserved. Patient's gait is cautious and antalgic. Skin and soft tissues are mildly swollen, consistent with synovitis and effusion. " The patient has a significant limp with the first few steps after starting the gait cycle. Getting out of a chair takes a lot of effort due to pain on knee flexion.       Diagnostics:      Procedure:  Large Joint Arthrocentesis: R knee  Date/Time: 6/26/2019 10:22 AM  Consent given by: patient  Site marked: site marked  Timeout: Immediately prior to procedure a time out was called to verify the correct patient, procedure, equipment, support staff and site/side marked as required   Supporting Documentation  Indications: pain   Procedure Details  Location: knee - R knee  Preparation: Patient was prepped and draped in the usual sterile fashion  Needle size: 25 G  Approach: anteromedial  Medications administered: 160 mg methylPREDNISolone acetate 80 MG/ML; 2 mL lidocaine (cardiac)  Patient tolerance: patient tolerated the procedure well with no immediate complications    Large Joint Arthrocentesis  Date/Time: 6/26/2019 10:23 AM  Consent given by: patient  Site marked: site marked  Timeout: Immediately prior to procedure a time out was called to verify the correct patient, procedure, equipment, support staff and site/side marked as required   Supporting Documentation  Indications: pain   Procedure Details  Preparation: Patient was prepped and draped in the usual sterile fashion  Needle size: 25 G  Approach: anteromedial  Medications administered: 160 mg methylPREDNISolone acetate 80 MG/ML; 2 mL lidocaine (cardiac)  Patient tolerance: patient tolerated the procedure well with no immediate complications          Assessment:   Prema was seen today for follow-up, pain, follow-up and pain.    Diagnoses and all orders for this visit:    Bilateral primary osteoarthritis of knee  -     Large Joint Arthrocentesis: R knee  -     Large Joint Arthrocentesis          Plan:   · Injected patient's bilateral knee joint(s)with steroid  from an anteromedial approach   · Compression/brace   · Rest, ice, compression, and elevation (RICE)  therapy  · OTC Tylenol 1000mg by mouth every 4-6 hours as needed for pain   · Follow up in 3 month(s) or  · Synvisc Visco supplementation discussed with the patient as a nonoperative modality of managing her pain and symptoms.  · The patient will most certainly require total knee arthroplasty in the future and she will let me know when she is ready for that form of surgical intervention.    Date of encounter: 06/26/2019   Dean Trinidad MD

## 2019-08-08 ENCOUNTER — CLINICAL SUPPORT (OUTPATIENT)
Dept: ORTHOPEDIC SURGERY | Facility: CLINIC | Age: 69
End: 2019-08-08

## 2019-08-08 DIAGNOSIS — G89.29 BILATERAL CHRONIC KNEE PAIN: ICD-10-CM

## 2019-08-08 DIAGNOSIS — M25.562 BILATERAL CHRONIC KNEE PAIN: ICD-10-CM

## 2019-08-08 DIAGNOSIS — M25.512 CHRONIC PAIN OF BOTH SHOULDERS: Primary | ICD-10-CM

## 2019-08-08 DIAGNOSIS — M25.511 CHRONIC PAIN OF BOTH SHOULDERS: Primary | ICD-10-CM

## 2019-08-08 DIAGNOSIS — M25.561 BILATERAL CHRONIC KNEE PAIN: ICD-10-CM

## 2019-08-08 DIAGNOSIS — G89.29 CHRONIC PAIN OF BOTH SHOULDERS: Primary | ICD-10-CM

## 2019-08-08 PROCEDURE — 20610 DRAIN/INJ JOINT/BURSA W/O US: CPT | Performed by: ORTHOPAEDIC SURGERY

## 2019-08-08 PROCEDURE — 99214 OFFICE O/P EST MOD 30 MIN: CPT | Performed by: ORTHOPAEDIC SURGERY

## 2019-08-08 RX ORDER — ROSUVASTATIN CALCIUM 10 MG/1
10 TABLET, COATED ORAL NIGHTLY
Refills: 4 | COMMUNITY
Start: 2019-07-26 | End: 2022-09-02 | Stop reason: ALTCHOICE

## 2019-08-08 RX ADMIN — METHYLPREDNISOLONE ACETATE 160 MG: 80 INJECTION, SUSPENSION INTRA-ARTICULAR; INTRALESIONAL; INTRAMUSCULAR; SOFT TISSUE at 10:15

## 2019-08-08 RX ADMIN — LIDOCAINE HYDROCHLORIDE 2 ML: 10 INJECTION, SOLUTION EPIDURAL; INFILTRATION; INTRACAUDAL; PERINEURAL at 10:15

## 2019-08-08 NOTE — PROGRESS NOTES
ORTHO FOLLOW UP VISIT    Patient: Prema Hoang    YOB: 1950    MRN: [id number]    Chief Complaint   Patient presents with   • Right Shoulder - Follow-up   • Left Shoulder - Follow-up   CC: Follow-up on bilateral shoulder cuff tear arthropathy and severe right knee pain and discomfort.    History of Present Illness: Patient is seen in the office today with complaint of bilateral shoulder pain. The pain is daily, generalized in the shoulder(s), and is acute on chronic in nature, and is worsened by overhead motion and cross body activities. It has been progressive in nature but remains intermittent . Has had improvement in the past with ice, NSAIDS and injections.Her right knee is bothering her significantly and it mihir and gives out on her. She has tried all forms of non op care and is now wanting a total knee replacement to improve her quality of life.  She states that the knee prevents her from walking and she would like to walk for exercise.  She has tried anti-inflammatory medication as well as a brace on the knee but continues to have pain and symptoms that are not controlled with anti-inflammatory medication and nonoperative means.    This problem is not new to this examiner.     Allergies:   Allergies   Allergen Reactions   • Propoxyphene GI Intolerance       Medications:   Home Medications:  Current Outpatient Medications on File Prior to Visit   Medication Sig   • aspirin 81 MG tablet Take 81 mg by mouth daily.   • Calcium Carb-Cholecalciferol (CALCIUM 600+D3) 600-200 MG-UNIT tablet Take  by mouth daily.   • Cholecalciferol (VITAMIN D3) 2000 UNITS capsule Take 2,000 Units by mouth daily.   • denosumab (PROLIA) 60 MG/ML solution syringe Inject  under the skin 1 (One) Time. Every 6 months   • esomeprazole (NEXIUM) 40 MG capsule TAKE 1 CAPSULE BY MOUTH DAILY   • Garlic 100 MG tablet Take  by mouth.   • levocetirizine (XYZAL) 5 MG tablet TK 1 T PO D   • meloxicam (MOBIC) 15 MG tablet TK 1 T PO   QD   • MethylPREDNISolone (MEDROL, SHARON,) 4 MG tablet Use as directed by package instructions   • metoprolol tartrate (LOPRESSOR) 25 MG tablet Take 25 mg by mouth 2 (Two) Times a Day.   • Multiple Vitamin (MULTI-VITAMINS PO) Take  by mouth daily.   • Multiple Vitamins-Minerals (CENTRUM SILVER 50+WOMEN PO) Take  by mouth.   • Probiotic Product (ALIGN PO) Take  by mouth.   • rosuvastatin (CRESTOR) 10 MG tablet Take 10 mg by mouth Daily.   • Turmeric 500 MG capsule Take  by mouth.     No current facility-administered medications on file prior to visit.      Current Medications:  Scheduled Meds:  PRN Meds:.    I have reviewed the patient's medical history in detail and updated the computerized patient record.  Review and summarization of old records include:    Past Medical History:   Diagnosis Date   • Breast cancer (CMS/HCC) 2002, 2007    Lumpectomy with radiation and chemotherapy on the left followed by bilateral mastectomy 2007   • Osteoporosis      Past Surgical History:   Procedure Laterality Date   • BACK SURGERY  2004   • BREAST LUMPECTOMY Left 2002   • COMBINED HYSTERECTOMY VAGINAL / OOPHORECTOMY / A&P REPAIR  2002   • MASTECTOMY Bilateral 2007   • NECK SURGERY  2006     Social History     Occupational History   • Not on file   Tobacco Use   • Smoking status: Former Smoker   • Smokeless tobacco: Never Used   Substance and Sexual Activity   • Alcohol use: Yes     Alcohol/week: 3.6 oz     Types: 6 Glasses of wine per week     Comment: 6 days per week   • Drug use: No   • Sexual activity: Defer      Social History     Social History Narrative   • Not on file     Family History   Problem Relation Age of Onset   • No Known Problems Mother    • Hypertension Father    • Aneurysm Father    • Heart disease Father    • Other Father         mesothelioma   • Cancer Brother         colon, liver       Review of Systems   Constitutional: Negative.  Negative for fever.   Eyes: Negative.    Respiratory: Negative.   "  Cardiovascular: Negative.    Endocrine: Negative.    Musculoskeletal: Positive for arthralgias, gait problem and joint swelling.   Skin: Negative.  Negative for rash and wound.   Allergic/Immunologic: Negative.    Neurological: Negative for numbness.   Hematological: Negative.    Psychiatric/Behavioral: Negative.            Wt Readings from Last 3 Encounters:   06/26/19 69.4 kg (153 lb)   04/24/19 67.6 kg (149 lb)   03/11/19 68 kg (150 lb)     Ht Readings from Last 3 Encounters:   06/26/19 165.1 cm (65\")   04/24/19 165.1 cm (65\")   03/11/19 165 cm (64.96\")     There is no height or weight on file to calculate BMI.  No height and weight on file for this encounter.  There were no vitals filed for this visit.    Physical Exam  Constitutional: Patient is oriented to person, place, and time. Appears well-developed and well-nourished.   HENT:   Head: Normocephalic and atraumatic.   Eyes: Conjunctivae and EOM are normal. Pupils are equal, round, and reactive to light.   Cardiovascular: Normal rate, regular rhythm, normal heart sounds and intact distal pulses.   Pulmonary/Chest: Effort normal and breath sounds normal.   Musculoskeletal:   See detailed exam below   Neurological: Alert and oriented to person, place, and time. No sensory deficit. Coordination normal.   Skin: Skin is warm and dry. Capillary refill takes less than 2 seconds. No rash noted. No erythema.   Psychiatric: Patient has a normal mood and affect. Her behavior is normal. Judgment and thought content normal.   Nursing note and vitals reviewed.    Musculoskeletal:    Bilateral knee (varus). Patient has crepitus throughout range of motion. Positive patellar grind test. Mild effusion. Lachman is negative. Pivot shift is negative. Anterior and posterior drawer signs are negative. Significant joint line tenderness is noted on the medial aspect of the knee. Patient has a varus orientation of the knee. There is fullness and tenderness in the Popliteal fossa. " Mild distention of a Popliteal cyst is noted in this location. Range of motion in flexion is from 0-110 degrees. Neurovascular status is intact.  Dorsalis pedis and posterior tibial artery pulses are palpable. Common peroneal nerve function is well preserved. Patient's gait is cautious and antalgic. Skin and soft tissues are mildly swollen, consistent with synovitis and effusion. The patient has a significant limp with the first few steps after starting the gait cycle. Getting out of a chair takes a lot of effort due to pain on knee flexion.   Bilateral shoulder (cta). Rotator cuff function is extremely impaired. There is a high riding humeral head with articulation of the humerus to the undersurface of the acromiohumeral articulation. AC joint is exquisitely tender and painful for patient. Axillary nerve function is well preserved. There is significant winging of the scapula with hollowing of the fossae over the proximal and distal aspects of the spine of the scapula. Forward flexion is 0-30 degrees, active abduction is 0-60 degrees. Drop arm sign is positive. External rotation against resistance is extremely painful and limited for the patient. Skin and soft tissues are essentially normal other than some amounts of swelling. The pain level is 6      Diagnostics:  no diagnostic testing performed this visit previously obtained knee x-rays are reviewed.  There is a complete loss of medial joint space.  Varus deformity of the knee is noted.  Patellofemoral distance is completely obliterated.  My recommendations to proceed with right total knee arthroplasty are based on the findings of the plain x-ray along with clinical correlation.      Procedure:  Large Joint Arthrocentesis: R subacromial bursa  Date/Time: 8/8/2019 10:15 AM  Consent given by: patient  Site marked: site marked  Timeout: Immediately prior to procedure a time out was called to verify the correct patient, procedure, equipment, support staff and  site/side marked as required   Supporting Documentation  Indications: pain   Procedure Details  Location: shoulder - R subacromial bursa  Preparation: Patient was prepped and draped in the usual sterile fashion  Needle size: 25 G  Approach: posterior  Medications administered: 2 mL lidocaine PF 1% 1 %; 160 mg methylPREDNISolone acetate 80 MG/ML  Patient tolerance: patient tolerated the procedure well with no immediate complications    Large Joint Arthrocentesis: L subacromial bursa  Date/Time: 8/8/2019 10:15 AM  Consent given by: patient  Site marked: site marked  Timeout: Immediately prior to procedure a time out was called to verify the correct patient, procedure, equipment, support staff and site/side marked as required   Supporting Documentation  Indications: pain   Procedure Details  Location: shoulder - L subacromial bursa  Preparation: Patient was prepped and draped in the usual sterile fashion  Needle size: 25 G  Approach: posterior  Medications administered: 2 mL lidocaine PF 1% 1 %; 160 mg methylPREDNISolone acetate 80 MG/ML  Patient tolerance: patient tolerated the procedure well with no immediate complications      Injection(s) above administered by Wei Hand PA-C.      Assessment:   Prema was seen today for follow-up and follow-up.    Diagnoses and all orders for this visit:    Chronic pain of both shoulders  -     Large Joint Arthrocentesis: R subacromial bursa  -     Large Joint Arthrocentesis: L subacromial bursa    Bilateral chronic knee pain          Plan:   · Injected patient's bilateral shoulder joint(s)with steroid  from an anterolateral approach   · Compression/brace to the knee to prevent buckling of the knee.   · She is aware she eventually needs a knee replacement of the right knee.  · Tab Mobic 7.5 mg PO q hs prn pain  · Rest, ice, compression, and elevation (RICE) therapy  · OTC Ibuprofen 600mg by mouth every 6-8 hours as needed for pain and swelling  · Follow up in 3  month(s).  · Patient benefits of right total knee arthroplasty discussed with the patient at length.  She will call us back when she is ready to schedule the surgical intervention.  · Time spent in the office today with the patient attending to her shoulders as well as discussing risks and benefits of total knee arthroplasty with the patient is 30 minutes.  Greater than 50% of my time was spent face-to-face with the patient going over the rationale for surgical scheduling of the right knee.  The patient was seen today for preoperative discussion.  The patient has been tried on over-the-counter and prescription NSAID's despite the risks of anti-inflammatory bleeding, peptic ulcers and erosive gastritis with short term benefit only.  Braces have been prescribed for mechanical support.  Patient has been participating in an exercise program specifically targeting joint pain relief with limited benefit. Intraarticular injections have been used periodically with some but not complete relief of pain.  Ambulation aids have also been utilized.      · The details of the surgical procedure were explained including the location of probable incisions and a description of the likely hardware/grafts to be used. The patient understands the likely convalescence after surgery as well as the rehabilitation required.  Also, we have thoroughly discussed with the patient the risks, benefits and alternatives to surgery.  Risks include but are not limited to the risk of infection, joint stiffness, limited range of motion, wound healing problems, scar tissue build up, myocardial infarction, stroke, blood clots (including DVT and/or pulmonary embolus along with the risk of death) neurologic and/or vascular injury, limb length discrepancy, fracture, dislocation, nonunion, malunion, continued pain and need for further surgery including hardware failure requiring revision.     Date of encounter: 08/08/2019   Dean Trinidad MD

## 2019-08-29 RX ORDER — LIDOCAINE HYDROCHLORIDE 10 MG/ML
2 INJECTION, SOLUTION EPIDURAL; INFILTRATION; INTRACAUDAL; PERINEURAL
Status: COMPLETED | OUTPATIENT
Start: 2019-08-08 | End: 2019-08-08

## 2019-08-29 RX ORDER — METHYLPREDNISOLONE ACETATE 80 MG/ML
160 INJECTION, SUSPENSION INTRA-ARTICULAR; INTRALESIONAL; INTRAMUSCULAR; SOFT TISSUE
Status: COMPLETED | OUTPATIENT
Start: 2019-08-08 | End: 2019-08-08

## 2019-09-25 ENCOUNTER — OFFICE VISIT (OUTPATIENT)
Dept: ORTHOPEDIC SURGERY | Facility: CLINIC | Age: 69
End: 2019-09-25

## 2019-09-25 VITALS — BODY MASS INDEX: 25.33 KG/M2 | WEIGHT: 152 LBS | HEIGHT: 65 IN

## 2019-09-25 DIAGNOSIS — G89.29 BILATERAL CHRONIC KNEE PAIN: Primary | ICD-10-CM

## 2019-09-25 DIAGNOSIS — M25.562 BILATERAL CHRONIC KNEE PAIN: Primary | ICD-10-CM

## 2019-09-25 DIAGNOSIS — M25.561 BILATERAL CHRONIC KNEE PAIN: Primary | ICD-10-CM

## 2019-09-25 PROCEDURE — 73560 X-RAY EXAM OF KNEE 1 OR 2: CPT | Performed by: ORTHOPAEDIC SURGERY

## 2019-09-25 PROCEDURE — 99213 OFFICE O/P EST LOW 20 MIN: CPT | Performed by: ORTHOPAEDIC SURGERY

## 2019-09-25 PROCEDURE — 20610 DRAIN/INJ JOINT/BURSA W/O US: CPT | Performed by: ORTHOPAEDIC SURGERY

## 2019-09-25 RX ORDER — METHYLPREDNISOLONE ACETATE 80 MG/ML
160 INJECTION, SUSPENSION INTRA-ARTICULAR; INTRALESIONAL; INTRAMUSCULAR; SOFT TISSUE
Status: COMPLETED | OUTPATIENT
Start: 2019-09-25 | End: 2019-09-25

## 2019-09-25 RX ORDER — INFLUENZA A VIRUS A/MICHIGAN/45/2015 X-275 (H1N1) ANTIGEN (FORMALDEHYDE INACTIVATED), INFLUENZA A VIRUS A/SINGAPORE/INFIMH-16-0019/2016 IVR-186 (H3N2) ANTIGEN (FORMALDEHYDE INACTIVATED), AND INFLUENZA B VIRUS B/MARYLAND/15/2016 BX-69A (A B/COLORADO/6/2017-LIKE VIRUS) ANTIGEN (FORMALDEHYDE INACTIVATED) 60; 60; 60 UG/.5ML; UG/.5ML; UG/.5ML
INJECTION, SUSPENSION INTRAMUSCULAR
Refills: 0 | COMMUNITY
Start: 2019-08-28 | End: 2020-08-20

## 2019-09-25 RX ORDER — FAMCICLOVIR 500 MG/1
TABLET ORAL
Refills: 3 | COMMUNITY
Start: 2019-08-07 | End: 2021-01-05 | Stop reason: SDUPTHER

## 2019-09-25 RX ADMIN — METHYLPREDNISOLONE ACETATE 160 MG: 80 INJECTION, SUSPENSION INTRA-ARTICULAR; INTRALESIONAL; INTRAMUSCULAR; SOFT TISSUE at 15:09

## 2019-09-25 RX ADMIN — METHYLPREDNISOLONE ACETATE 160 MG: 80 INJECTION, SUSPENSION INTRA-ARTICULAR; INTRALESIONAL; INTRAMUSCULAR; SOFT TISSUE at 15:08

## 2019-09-25 NOTE — PROGRESS NOTES
FOLLOW UP VISIT    Patient: Prema Hoang  ?  YOB: 1950    MRN: [id number]  ?  Chief Complaint   Patient presents with   • Left Knee - Follow-up, Pain   • Right Knee - Pain, Follow-up   • Injections      ?  HPI: The patient is complaining of bilateral knee pain and discomfort.  She has difficulty with ambulation and difficulty with going up and down steps.  She states that her knees tend to buckle and give out from underneath her.  She is having a lot of difficulty between her bilateral shoulder pain and her knee pain.  This is affecting her quality of life in a negative fashion.  She denies any history of direct trauma to the knees but has fallen in the past.  She is progressively developing a varus alignment of both the knees.  The right is more symptomatic than the left.    Pain Location: bilateral knee(s)  Radiation: none  Quality: dull, aching  Intensity/Severity: moderate  Duration: Several month(s)  Onset quality: gradual   Timing: intermittent  Aggravating Factors: rising after sitting, running, squatting and standing for prolonged time  Alleviating Factors: OTC analgesics, NSAID's and ambulating  Previous Episodes: yes  Associated Symptoms: swelling, decreased ROM, decreased strength  ADL Affected: ambulating  Previous Treatment: Anti-inflammatory medication and the use of a brace.    This patient is an established patient.  This problem is not new to this examiner.      Allergies:   Allergies   Allergen Reactions   • Propoxyphene GI Intolerance       Medications:   Home Medications:  Current Outpatient Medications on File Prior to Visit   Medication Sig   • Calcium Carb-Cholecalciferol (CALCIUM 600+D3) 600-200 MG-UNIT tablet Take  by mouth daily.   • Cholecalciferol (VITAMIN D3) 2000 UNITS capsule Take 2,000 Units by mouth daily.   • denosumab (PROLIA) 60 MG/ML solution syringe Inject  under the skin 1 (One) Time. Every 6 months   • esomeprazole (NEXIUM) 40 MG capsule TAKE 1 CAPSULE BY  MOUTH DAILY   • famciclovir (FAMVIR) 500 MG tablet TAKE 3 TABLETS BY MOUTH ONE TIME AS NEEDED FOR HSV OUTBREAK   • FLUZONE HIGH-DOSE 0.5 ML suspension prefilled syringe injection ADM 0.5ML IM UTD   • Garlic 100 MG tablet Take  by mouth.   • levocetirizine (XYZAL) 5 MG tablet TK 1 T PO D   • M-M-R II injection TO BE ADMINISTERED BY PHARMACIST FOR IMMUNIZATION   • meloxicam (MOBIC) 15 MG tablet TK 1 T PO  QD   • MethylPREDNISolone (MEDROL, SHARON,) 4 MG tablet Use as directed by package instructions   • metoprolol tartrate (LOPRESSOR) 25 MG tablet Take 25 mg by mouth 2 (Two) Times a Day.   • Multiple Vitamin (MULTI-VITAMINS PO) Take  by mouth daily.   • Multiple Vitamins-Minerals (CENTRUM SILVER 50+WOMEN PO) Take  by mouth.   • Probiotic Product (ALIGN PO) Take  by mouth.   • rosuvastatin (CRESTOR) 10 MG tablet Take 10 mg by mouth Daily.   • Turmeric 500 MG capsule Take  by mouth.   • aspirin 81 MG tablet Take 81 mg by mouth daily.     No current facility-administered medications on file prior to visit.      Current Medications:  Scheduled Meds:  PRN Meds:.    I have reviewed the patient's medical history in detail and updated the computerized patient record.  Review and summarization of old records include:    Past Medical History:   Diagnosis Date   • Breast cancer (CMS/HCC) 2002, 2007    Lumpectomy with radiation and chemotherapy on the left followed by bilateral mastectomy 2007   • Osteoporosis      Past Surgical History:   Procedure Laterality Date   • BACK SURGERY  2004   • BREAST LUMPECTOMY Left 2002   • COMBINED HYSTERECTOMY VAGINAL / OOPHORECTOMY / A&P REPAIR  2002   • MASTECTOMY Bilateral 2007   • NECK SURGERY  2006     Social History     Occupational History   • Not on file   Tobacco Use   • Smoking status: Former Smoker   • Smokeless tobacco: Never Used   Substance and Sexual Activity   • Alcohol use: Yes     Alcohol/week: 3.6 oz     Types: 6 Glasses of wine per week     Comment: 6 days per week   • Drug use:  "No   • Sexual activity: Defer      Social History     Social History Narrative   • Not on file     Family History   Problem Relation Age of Onset   • No Known Problems Mother    • Hypertension Father    • Aneurysm Father    • Heart disease Father    • Other Father         mesothelioma   • Cancer Brother         colon, liver         Review of Systems  Constitutional: Negative for appetite change.   HENT: Negative.    Eyes: Negative.    Respiratory: Negative.    Cardiovascular: Negative.    Gastrointestinal: Negative.    Endocrine: Negative.    Genitourinary: Negative.    Musculoskeletal: See details of exam below.  Skin: Negative.    Allergic/Immunologic: Negative.    Hematological: Negative.    Psychiatric/Behavioral: Negative.         Wt Readings from Last 3 Encounters:   09/25/19 68.9 kg (152 lb)   06/26/19 69.4 kg (153 lb)   04/24/19 67.6 kg (149 lb)     Ht Readings from Last 3 Encounters:   09/25/19 165.1 cm (65\")   06/26/19 165.1 cm (65\")   04/24/19 165.1 cm (65\")     Body mass index is 25.29 kg/m².  Facility age limit for growth percentiles is 20 years.  There were no vitals filed for this visit.      Physical Exam  Constitutional: Patient is oriented to person, place, and time. Appears well-developed and well-nourished.   HENT:   Head: Normocephalic and atraumatic.   Eyes: Conjunctivae and EOM are normal. Pupils are equal, round, and reactive to light.   Cardiovascular: Normal rate, regular rhythm, normal heart sounds and intact distal pulses.   Pulmonary/Chest: Effort normal and breath sounds normal.   Musculoskeletal:   See detailed exam below   Neurological: Alert and oriented to person, place, and time. No sensory deficit. Coordination normal.   Skin: Skin is warm and dry. Capillary refill takes less than 2 seconds. No rash noted. No erythema.   Psychiatric: Patient has a normal mood and affect. Her behavior is normal. Judgment and thought content normal.   Nursing note and vitals reviewed.      Ortho " Exam:   Bilateral knee (varus). Patient has crepitus throughout range of motion. Positive patellar grind test. Mild effusion. Lachman is negative. Pivot shift is negative. Anterior and posterior drawer signs are negative. Significant joint line tenderness is noted on the medial aspect of the knee. Patient has a varus orientation of the knee. There is fullness and tenderness in the Popliteal fossa. Mild distention of a Popliteal cyst is noted in this location. Range of motion in flexion is from 0-110 degrees. Neurovascular status is intact.  Dorsalis pedis and posterior tibial artery pulses are palpable. Common peroneal nerve function is well preserved. Patient's gait is cautious and antalgic. Skin and soft tissues are mildly swollen, consistent with synovitis and effusion. The patient has a significant limp with the first few steps after starting the gait cycle. Getting out of a chair takes a lot of effort due to pain on knee flexion.         Diagnostics:  bilateral Knee X-Ray  Indication: Evaluation of pain and discomfort on the medial aspect of both knees.  AP, Lateral views  Findings: Moderately advanced degenerative osteoarthritis with narrowing of the medial joint space.  no bony lesion  Soft tissues within normal limits  decreased joint spaces  Hardware appropriately positioned not applicable      yes prior studies available for comparison.    This patient's x-ray report was graded according to the Kellgren and Abraham classification.  This took into account the joint space narrowing, osteophyte formation, sclerosis of the distal femur/proximal tibia along with deformity of those bones.  The findings were indicative of K L grade 3.    X-RAY was ordered and reviewed by Dean Trinidad MD       Assessment:  Prema was seen today for injections, follow-up, pain, pain and follow-up.    Diagnoses and all orders for this visit:    Bilateral chronic knee pain  -     XR Knee 1 or 2 View Bilateral          Large Joint  Arthrocentesis: L knee  Date/Time: 9/25/2019 3:08 PM  Consent given by: patient  Site marked: site marked  Timeout: Immediately prior to procedure a time out was called to verify the correct patient, procedure, equipment, support staff and site/side marked as required   Supporting Documentation  Indications: pain   Procedure Details  Location: knee - L knee  Preparation: Patient was prepped and draped in the usual sterile fashion  Needle size: 25 G  Approach: anteromedial  Medications administered: 160 mg methylPREDNISolone acetate 80 MG/ML; 2 mL lidocaine (cardiac)  Patient tolerance: patient tolerated the procedure well with no immediate complications    Large Joint Arthrocentesis: R knee  Date/Time: 9/25/2019 3:09 PM  Consent given by: patient  Site marked: site marked  Timeout: Immediately prior to procedure a time out was called to verify the correct patient, procedure, equipment, support staff and site/side marked as required   Supporting Documentation  Indications: pain   Procedure Details  Location: knee - R knee  Preparation: Patient was prepped and draped in the usual sterile fashion  Needle size: 25 G  Approach: anteromedial  Medications administered: 160 mg methylPREDNISolone acetate 80 MG/ML; 2 mL lidocaine (cardiac)  Patient tolerance: patient tolerated the procedure well with no immediate complications        ?    Plan    · Compression/brace to the need to prevent it from buckling and giving out.  · Injected patient's right knee with a steroid from an anteromedial approach.  · Injected patient's left knee with a steroid from an anteromedial approach.  · Rest, ice, compression, and elevation (RICE) therapy  · Stretching and strengthening exercises of the quads and the hamstrings.  · Calcium and vitamin D for bone health.  · Glucosamine and chondroitin for cartilage health along with turmeric.  · She will eventually need knee replacement surgery and we have discussed that with her in great  detail.  · OTC Tylenol 500-1000mg by mouth every 6 hours as needed for pain   · Follow up in 3 month(s)    Date of encounter: 09/25/2019   Dean Trinidad MD

## 2019-10-09 ENCOUNTER — CLINICAL SUPPORT (OUTPATIENT)
Dept: ORTHOPEDIC SURGERY | Facility: CLINIC | Age: 69
End: 2019-10-09

## 2019-10-09 VITALS — BODY MASS INDEX: 24.91 KG/M2 | WEIGHT: 155 LBS | TEMPERATURE: 97.5 F | HEIGHT: 66 IN

## 2019-10-09 DIAGNOSIS — M25.512 CHRONIC PAIN OF BOTH SHOULDERS: ICD-10-CM

## 2019-10-09 DIAGNOSIS — M25.511 CHRONIC PAIN OF BOTH SHOULDERS: ICD-10-CM

## 2019-10-09 DIAGNOSIS — M66.241 NONTRAUMATIC SUBLUXATION OF EXTENSOR TENDON AT METACARPOPHALANGEAL JOINT OF RIGHT HAND: Primary | ICD-10-CM

## 2019-10-09 DIAGNOSIS — G89.29 CHRONIC PAIN OF BOTH SHOULDERS: ICD-10-CM

## 2019-10-09 PROCEDURE — 20610 DRAIN/INJ JOINT/BURSA W/O US: CPT | Performed by: PHYSICIAN ASSISTANT

## 2019-10-09 PROCEDURE — 99212 OFFICE O/P EST SF 10 MIN: CPT | Performed by: PHYSICIAN ASSISTANT

## 2019-10-09 RX ADMIN — METHYLPREDNISOLONE ACETATE 160 MG: 80 INJECTION, SUSPENSION INTRA-ARTICULAR; INTRALESIONAL; INTRAMUSCULAR; SOFT TISSUE at 09:50

## 2019-10-09 RX ADMIN — LIDOCAINE HYDROCHLORIDE 2 ML: 10 INJECTION, SOLUTION EPIDURAL; INFILTRATION; INTRACAUDAL; PERINEURAL at 09:51

## 2019-10-09 RX ADMIN — METHYLPREDNISOLONE ACETATE 160 MG: 80 INJECTION, SUSPENSION INTRA-ARTICULAR; INTRALESIONAL; INTRAMUSCULAR; SOFT TISSUE at 09:51

## 2019-10-09 RX ADMIN — LIDOCAINE HYDROCHLORIDE 2 ML: 10 INJECTION, SOLUTION EPIDURAL; INFILTRATION; INTRACAUDAL; PERINEURAL at 09:50

## 2019-10-09 NOTE — PROGRESS NOTES
Procedure   Large Joint Arthrocentesis: R glenohumeral  Date/Time: 10/9/2019 9:50 AM  Consent given by: patient  Site marked: site marked  Timeout: Immediately prior to procedure a time out was called to verify the correct patient, procedure, equipment, support staff and site/side marked as required   Supporting Documentation  Indications: pain   Procedure Details  Location: shoulder - R glenohumeral  Preparation: Patient was prepped and draped in the usual sterile fashion  Needle size: 25 G  Approach: posterior  Medications administered: 2 mL lidocaine PF 1% 1 %; 160 mg methylPREDNISolone acetate 80 MG/ML  Patient tolerance: patient tolerated the procedure well with no immediate complications    Large Joint Arthrocentesis: L glenohumeral  Date/Time: 10/9/2019 9:51 AM  Consent given by: patient  Site marked: site marked  Timeout: Immediately prior to procedure a time out was called to verify the correct patient, procedure, equipment, support staff and site/side marked as required   Supporting Documentation  Indications: pain   Procedure Details  Location: shoulder - L glenohumeral  Preparation: Patient was prepped and draped in the usual sterile fashion  Needle size: 25 G  Approach: posterior  Medications administered: 2 mL lidocaine PF 1% 1 %; 160 mg methylPREDNISolone acetate 80 MG/ML  Patient tolerance: patient tolerated the procedure well with no immediate complications      Electronically signed by Wei Hand PA-C, 10/14/19, 9:15 PM.

## 2019-10-14 PROBLEM — M66.241 NONTRAUMATIC SUBLUXATION OF EXTENSOR TENDON AT METACARPOPHALANGEAL JOINT OF RIGHT HAND: Status: ACTIVE | Noted: 2019-10-14

## 2019-10-14 RX ORDER — METHYLPREDNISOLONE ACETATE 80 MG/ML
160 INJECTION, SUSPENSION INTRA-ARTICULAR; INTRALESIONAL; INTRAMUSCULAR; SOFT TISSUE
Status: COMPLETED | OUTPATIENT
Start: 2019-10-09 | End: 2019-10-09

## 2019-10-14 RX ORDER — LIDOCAINE HYDROCHLORIDE 10 MG/ML
2 INJECTION, SOLUTION EPIDURAL; INFILTRATION; INTRACAUDAL; PERINEURAL
Status: COMPLETED | OUTPATIENT
Start: 2019-10-09 | End: 2019-10-09

## 2019-10-15 ENCOUNTER — TELEPHONE (OUTPATIENT)
Dept: ORTHOPEDIC SURGERY | Facility: CLINIC | Age: 69
End: 2019-10-15

## 2019-10-15 NOTE — PROGRESS NOTES
NEW VISIT    Patient: Prema Hoang    YOB: 1950    MRN: [id number]    Chief Complaint   Patient presents with   • Right Shoulder - Follow-up   • Left Shoulder - Follow-up   • Injections     BILATERAL SHOULDER INJECTIONS       History of Present Illness: Ms. Hoang presents today for follow-up on bilateral shoulder pain.  He needs to do well with injections every 3 months.  Her pain has been progressive and remains constant once treatment wears off. She reports the left side is more painful than the right most noticeable when trying to sleep at nighttime.  She also presents with a new complaint of triggering type symptoms of her right index finger.  She denies any injury and states that the symptoms have been present for the last couple of months.  Knee flexion or extension of the index finger causes discomfort and a triggering type sensation.  Keeping the finger still is the only known treatment that improves her symptoms.    This problem is new to this examiner.     Allergies:   Allergies   Allergen Reactions   • Propoxyphene GI Intolerance       Medications:   Home Medications:  Current Outpatient Medications on File Prior to Visit   Medication Sig   • aspirin 81 MG tablet Take 81 mg by mouth daily.   • Calcium Carb-Cholecalciferol (CALCIUM 600+D3) 600-200 MG-UNIT tablet Take  by mouth daily.   • Cholecalciferol (VITAMIN D3) 2000 UNITS capsule Take 2,000 Units by mouth daily.   • denosumab (PROLIA) 60 MG/ML solution syringe Inject  under the skin 1 (One) Time. Every 6 months   • esomeprazole (NEXIUM) 40 MG capsule TAKE 1 CAPSULE BY MOUTH DAILY   • famciclovir (FAMVIR) 500 MG tablet TAKE 3 TABLETS BY MOUTH ONE TIME AS NEEDED FOR HSV OUTBREAK   • FLUZONE HIGH-DOSE 0.5 ML suspension prefilled syringe injection ADM 0.5ML IM UTD   • Garlic 100 MG tablet Take  by mouth.   • levocetirizine (XYZAL) 5 MG tablet TK 1 T PO D   • M-M-R II injection TO BE ADMINISTERED BY PHARMACIST FOR IMMUNIZATION   •  meloxicam (MOBIC) 15 MG tablet TK 1 T PO  QD   • MethylPREDNISolone (MEDROL, SHARON,) 4 MG tablet Use as directed by package instructions   • metoprolol tartrate (LOPRESSOR) 25 MG tablet Take 25 mg by mouth 2 (Two) Times a Day.   • Multiple Vitamin (MULTI-VITAMINS PO) Take  by mouth daily.   • Multiple Vitamins-Minerals (CENTRUM SILVER 50+WOMEN PO) Take  by mouth.   • Probiotic Product (ALIGN PO) Take  by mouth.   • rosuvastatin (CRESTOR) 10 MG tablet Take 10 mg by mouth Daily.   • Turmeric 500 MG capsule Take  by mouth.     No current facility-administered medications on file prior to visit.      Current Medications:  Scheduled Meds:  PRN Meds:.    I have reviewed the patient's medical history in detail and updated the computerized patient record.  Review and summarization of old records include:    Past Medical History:   Diagnosis Date   • Breast cancer (CMS/HCC) 2002, 2007    Lumpectomy with radiation and chemotherapy on the left followed by bilateral mastectomy 2007   • Osteoporosis      Past Surgical History:   Procedure Laterality Date   • BACK SURGERY  2004   • BREAST LUMPECTOMY Left 2002   • COMBINED HYSTERECTOMY VAGINAL / OOPHORECTOMY / A&P REPAIR  2002   • MASTECTOMY Bilateral 2007   • NECK SURGERY  2006     Social History     Occupational History   • Not on file   Tobacco Use   • Smoking status: Former Smoker   • Smokeless tobacco: Never Used   Substance and Sexual Activity   • Alcohol use: Yes     Alcohol/week: 3.6 oz     Types: 6 Glasses of wine per week     Comment: 6 days per week   • Drug use: No   • Sexual activity: Defer      Social History     Social History Narrative   • Not on file     Family History   Problem Relation Age of Onset   • No Known Problems Mother    • Hypertension Father    • Aneurysm Father    • Heart disease Father    • Other Father         mesothelioma   • Cancer Brother         colon, liver       Review of Systems:  Constitutional: Negative.    Eyes: Negative.    Respiratory:  "Negative.    Cardiovascular: Negative.    Endocrine: Negative.    Musculoskeletal: Positive for joint pain, decreased ROM, decreased strength.   Skin: Negative.    Allergic/Immunologic: Negative.    Neurological: Negative.    Hematological: Negative.    Psychiatric/Behavioral: Negative.            Wt Readings from Last 3 Encounters:   10/09/19 70.3 kg (155 lb)   09/25/19 68.9 kg (152 lb)   06/26/19 69.4 kg (153 lb)     Ht Readings from Last 3 Encounters:   10/09/19 166.4 cm (65.5\")   09/25/19 165.1 cm (65\")   06/26/19 165.1 cm (65\")     Body mass index is 25.4 kg/m².  Facility age limit for growth percentiles is 20 years.  Vitals:    10/09/19 0949   Temp: 97.5 °F (36.4 °C)       Physical Exam:  Constitutional: Patient is oriented to person, place, and time. Appears well-developed and well-nourished.   HENT:   Head: Normocephalic and atraumatic.   Eyes: Conjunctivae and EOM are normal. Pupils are equal, round, and reactive to light.   Cardiovascular: Normal rate and intact distal pulses.   Pulmonary/Chest: Effort normal and breath sounds normal.   Musculoskeletal:   See detailed exam below   Neurological: Alert and oriented to person, place, and time. No sensory deficit. Coordination normal.   Skin: Skin is warm and dry. Capillary refill takes less than 2 seconds. No rash noted. No erythema.   Psychiatric: Patient has a normal mood and affect. Her behavior is normal. Judgment and thought content normal.   Nursing note and vitals reviewed.      Musculoskeletal Exam:    Bilateral shoulder: Rotator cuff function is impaired. The humeral head is high riding. AC joint is tender and painful for patient. Axillary nerve function is well preserved. There is winging of the scapula with hollowing of the fossae over the proximal and distal aspects of the spine of the scapula. Forward flexion is 0-100 degrees, active abduction is 0-100 degrees. External rotation against resistance is painful and limited for the patient. Skin and " soft tissues are essentially normal other than some amounts of swelling. The pain level is 6    Right second finger: Skin is elderly swollen over so aspect of second MCP joint. Flexor and extensor tendon functions are maintained though there is obvious subluxation of the extensor tendon of the second digit. Capillary refill is two seconds with a brisk return. Median nerve function is normal. Radial artery pulse is palpable.    Diagnostics:  no diagnostic testing performed this visit    Procedure:  See separate procedure note      Assessment:   Prema was seen today for injections, follow-up and follow-up.    Diagnoses and all orders for this visit:    Nontraumatic subluxation of extensor tendon at metacarpophalangeal joint of right hand  -     Ambulatory Referral to Hand Surgery    Chronic pain of both shoulders  -     Large Joint Arthrocentesis: R glenohumeral  -     Large Joint Arthrocentesis: L glenohumeral          Plan:   · Injected patient's bilateral shoulder joint(s)with Steroid from an posterior approach   · Rest, ice, compression, and elevation (RICE) therapy  · OTC Alternate Ibuprofen and Tylenol as needed  · Follow up in 3 month(s)  · Referral to hand specialist for evaluation of subluxation of extensor tendon of right index finger  · Time spent with patient: 12 minutes face-to-face with greater than 50% spent in counseling and coordination of care. This time includes discussing symptoms of new complaint, physical exam and discussion regarding treatment options.     Date of encounter: 10/09/2019   Wei Hand PA-C    Electronically signed by Wei Hand PA-C, 10/14/19, 9:15 PM.

## 2019-10-15 NOTE — TELEPHONE ENCOUNTER
Can you please direct that message to Wei?  I really do not know the topic of this conversation and therefore do not know what to tell the patient.  Please send a message to Wei so she can talk to me about it and we can go from there.  Thank you

## 2019-10-15 NOTE — TELEPHONE ENCOUNTER
Patient seen by AS on 10/9 for bilateral shoulder injections and mentioned numbness in right index finger. Says that AS was going to ask JONNATHAN about this and get back with her. She has not heard anything.

## 2019-10-15 NOTE — TELEPHONE ENCOUNTER
Thank you, I placed a referral to hand surgery and I have called the patient to let her know this has been done and she should be hearing from someone soon to schedule her.

## 2019-12-23 ENCOUNTER — CLINICAL SUPPORT (OUTPATIENT)
Dept: ORTHOPEDIC SURGERY | Facility: CLINIC | Age: 69
End: 2019-12-23

## 2019-12-23 VITALS — WEIGHT: 151.8 LBS | HEIGHT: 65 IN | BODY MASS INDEX: 25.29 KG/M2

## 2019-12-23 DIAGNOSIS — M17.0 BILATERAL PRIMARY OSTEOARTHRITIS OF KNEE: Primary | ICD-10-CM

## 2019-12-23 PROCEDURE — 20610 DRAIN/INJ JOINT/BURSA W/O US: CPT | Performed by: ORTHOPAEDIC SURGERY

## 2019-12-23 RX ORDER — LIDOCAINE HYDROCHLORIDE 10 MG/ML
2 INJECTION, SOLUTION EPIDURAL; INFILTRATION; INTRACAUDAL; PERINEURAL
Status: COMPLETED | OUTPATIENT
Start: 2019-12-23 | End: 2019-12-23

## 2019-12-23 RX ORDER — METHYLPREDNISOLONE ACETATE 80 MG/ML
160 INJECTION, SUSPENSION INTRA-ARTICULAR; INTRALESIONAL; INTRAMUSCULAR; SOFT TISSUE
Status: COMPLETED | OUTPATIENT
Start: 2019-12-23 | End: 2019-12-23

## 2019-12-23 RX ADMIN — METHYLPREDNISOLONE ACETATE 160 MG: 80 INJECTION, SUSPENSION INTRA-ARTICULAR; INTRALESIONAL; INTRAMUSCULAR; SOFT TISSUE at 10:33

## 2019-12-23 RX ADMIN — LIDOCAINE HYDROCHLORIDE 2 ML: 10 INJECTION, SOLUTION EPIDURAL; INFILTRATION; INTRACAUDAL; PERINEURAL at 10:33

## 2019-12-23 NOTE — PROGRESS NOTES
INJECTION    Patient: Prema Hoang    YOB: 1950    MRN: [id number]    Chief Complaint   Patient presents with   • Left Knee - Follow-up   • Right Knee - Follow-up       History of Present Illness: Patient returns today for bilateral knee pain. The pain is located over the medial and lateral aspect of the joint.  The pain has been progressive in nature and remains constant.  The pain is worsened by going up and down stairs, kneeling, rising after sitting, squatting. There has not had improvement in the past with heat, Tylenol and injections.     This problem is not new to this examiner.     Allergies:   Allergies   Allergen Reactions   • Propoxyphene GI Intolerance       Medications:   Home Medications:  Current Outpatient Medications on File Prior to Visit   Medication Sig   • aspirin 81 MG tablet Take 81 mg by mouth daily.   • Calcium Carb-Cholecalciferol (CALCIUM 600+D3) 600-200 MG-UNIT tablet Take  by mouth daily.   • Cholecalciferol (VITAMIN D3) 2000 UNITS capsule Take 2,000 Units by mouth daily.   • denosumab (PROLIA) 60 MG/ML solution syringe Inject  under the skin 1 (One) Time. Every 6 months   • esomeprazole (NEXIUM) 40 MG capsule TAKE 1 CAPSULE BY MOUTH DAILY   • famciclovir (FAMVIR) 500 MG tablet TAKE 3 TABLETS BY MOUTH ONE TIME AS NEEDED FOR HSV OUTBREAK   • FLUZONE HIGH-DOSE 0.5 ML suspension prefilled syringe injection ADM 0.5ML IM UTD   • Garlic 100 MG tablet Take  by mouth.   • levocetirizine (XYZAL) 5 MG tablet TK 1 T PO D   • M-M-R II injection TO BE ADMINISTERED BY PHARMACIST FOR IMMUNIZATION   • meloxicam (MOBIC) 15 MG tablet TK 1 T PO  QD   • MethylPREDNISolone (MEDROL, SHARON,) 4 MG tablet Use as directed by package instructions   • metoprolol tartrate (LOPRESSOR) 25 MG tablet Take 25 mg by mouth 2 (Two) Times a Day.   • Multiple Vitamin (MULTI-VITAMINS PO) Take  by mouth daily.   • Multiple Vitamins-Minerals (CENTRUM SILVER 50+WOMEN PO) Take  by mouth.   • Probiotic Product (ALIGN  "PO) Take  by mouth.   • rosuvastatin (CRESTOR) 10 MG tablet Take 10 mg by mouth Daily.   • Turmeric 500 MG capsule Take  by mouth.     No current facility-administered medications on file prior to visit.      Current Medications:  Scheduled Meds:  PRN Meds:.    I have reviewed the patient's medical history in detail and updated the computerized patient record.  Review and summarization of old records include:    Past Medical History:   Diagnosis Date   • Breast cancer (CMS/HCC) 2002, 2007    Lumpectomy with radiation and chemotherapy on the left followed by bilateral mastectomy 2007   • Osteoporosis      Past Surgical History:   Procedure Laterality Date   • BACK SURGERY  2004   • BREAST LUMPECTOMY Left 2002   • COMBINED HYSTERECTOMY VAGINAL / OOPHORECTOMY / A&P REPAIR  2002   • MASTECTOMY Bilateral 2007   • NECK SURGERY  2006     Social History     Occupational History   • Not on file   Tobacco Use   • Smoking status: Former Smoker   • Smokeless tobacco: Never Used   Substance and Sexual Activity   • Alcohol use: Yes     Alcohol/week: 6.0 standard drinks     Types: 6 Glasses of wine per week     Comment: 6 days per week   • Drug use: No   • Sexual activity: Defer      Social History     Social History Narrative   • Not on file     Family History   Problem Relation Age of Onset   • No Known Problems Mother    • Hypertension Father    • Aneurysm Father    • Heart disease Father    • Other Father         mesothelioma   • Cancer Brother         colon, liver       Review of Systems        Wt Readings from Last 3 Encounters:   12/23/19 68.9 kg (151 lb 12.8 oz)   10/09/19 70.3 kg (155 lb)   09/25/19 68.9 kg (152 lb)     Ht Readings from Last 3 Encounters:   12/23/19 165.1 cm (65\")   10/09/19 166.4 cm (65.5\")   09/25/19 165.1 cm (65\")     Body mass index is 25.26 kg/m².  Facility age limit for growth percentiles is 20 years.  There were no vitals filed for this visit.    Physical Exam    Musculoskeletal:    Bilateral knee " (varus). Patient has crepitus throughout range of motion. Positive patellar grind test. Mild effusion. Lachman is negative. Pivot shift is negative. Anterior and posterior drawer signs are negative. Significant joint line tenderness is noted on the medial aspect of the knee. Patient has a varus orientation of the knee. There is fullness and tenderness in the Popliteal fossa. Mild distention of a Popliteal cyst is noted in this location. Range of motion in flexion is from 0-110 degrees. Neurovascular status is intact.  Dorsalis pedis and posterior tibial artery pulses are palpable. Common peroneal nerve function is well preserved. Patient's gait is cautious and antalgic. Skin and soft tissues are mildly swollen, consistent with synovitis and effusion. The patient has a significant limp with the first few steps after starting the gait cycle. Getting out of a chair takes a lot of effort due to pain on knee flexion.       Diagnostics:      Procedure:  Large Joint Arthrocentesis: R knee  Date/Time: 12/23/2019 10:33 AM  Consent given by: patient  Site marked: site marked  Timeout: Immediately prior to procedure a time out was called to verify the correct patient, procedure, equipment, support staff and site/side marked as required   Supporting Documentation  Indications: pain   Procedure Details  Location: knee - R knee  Preparation: Patient was prepped and draped in the usual sterile fashion  Needle size: 25 G  Approach: anteromedial  Medications administered: 160 mg methylPREDNISolone acetate 80 MG/ML; 2 mL lidocaine PF 1% 1 %  Patient tolerance: patient tolerated the procedure well with no immediate complications    Large Joint Arthrocentesis: L knee  Date/Time: 12/23/2019 10:33 AM  Consent given by: patient  Site marked: site marked  Timeout: Immediately prior to procedure a time out was called to verify the correct patient, procedure, equipment, support staff and site/side marked as required   Supporting  Documentation  Indications: pain   Procedure Details  Location: knee - L knee  Preparation: Patient was prepped and draped in the usual sterile fashion  Needle size: 25 G  Approach: anteromedial  Medications administered: 160 mg methylPREDNISolone acetate 80 MG/ML; 2 mL lidocaine PF 1% 1 %  Patient tolerance: patient tolerated the procedure well with no immediate complications            Assessment:   Prema was seen today for follow-up and follow-up.    Diagnoses and all orders for this visit:    Bilateral primary osteoarthritis of knee  -     Large Joint Arthrocentesis: R knee  -     Large Joint Arthrocentesis: L knee          Plan:   · Injected patient's bilateral knee joint(s)with steroid from an anteromedial approach   · Compression/brace   · Rest, ice, compression, and elevation (RICE) therapy  · OTC Tylenol 500-1000mg by mouth every 6 hours as needed for pain   · Follow up in 3 month(s).  · Discussed with the patient about the risks and benefits of Visco supplementation injection.  She thinks that she would like to try that before she considers knee replacement surgery.  There is no doubt in my mind that she is going to eventually need total knee arthroplasty based on the progression of her symptoms.  The patient understands and accepts that and will let me know when she is ready for surgical intervention.  She states that she is thinking about considering surgery in March or April 2020.    Date of encounter: 12/23/2019   Dean Trinidad MD

## 2020-03-11 ENCOUNTER — TRANSCRIBE ORDERS (OUTPATIENT)
Dept: ADMINISTRATIVE | Facility: HOSPITAL | Age: 70
End: 2020-03-11

## 2020-03-11 ENCOUNTER — CLINICAL SUPPORT (OUTPATIENT)
Dept: ORTHOPEDIC SURGERY | Facility: CLINIC | Age: 70
End: 2020-03-11

## 2020-03-11 VITALS — HEIGHT: 65 IN | WEIGHT: 149.8 LBS | TEMPERATURE: 97.4 F | BODY MASS INDEX: 24.96 KG/M2

## 2020-03-11 DIAGNOSIS — M25.512 CHRONIC PAIN OF BOTH SHOULDERS: Primary | ICD-10-CM

## 2020-03-11 DIAGNOSIS — G89.29 BILATERAL CHRONIC KNEE PAIN: ICD-10-CM

## 2020-03-11 DIAGNOSIS — M25.50 POLYARTHRALGIA: ICD-10-CM

## 2020-03-11 DIAGNOSIS — G89.29 CHRONIC PAIN OF BOTH SHOULDERS: Primary | ICD-10-CM

## 2020-03-11 DIAGNOSIS — M25.511 CHRONIC PAIN OF BOTH SHOULDERS: Primary | ICD-10-CM

## 2020-03-11 DIAGNOSIS — M25.562 BILATERAL CHRONIC KNEE PAIN: ICD-10-CM

## 2020-03-11 DIAGNOSIS — M25.561 BILATERAL CHRONIC KNEE PAIN: ICD-10-CM

## 2020-03-11 DIAGNOSIS — S66.310D: Primary | ICD-10-CM

## 2020-03-11 PROCEDURE — 20610 DRAIN/INJ JOINT/BURSA W/O US: CPT | Performed by: PHYSICIAN ASSISTANT

## 2020-03-11 PROCEDURE — 99212 OFFICE O/P EST SF 10 MIN: CPT | Performed by: PHYSICIAN ASSISTANT

## 2020-03-11 RX ORDER — TRAMADOL HYDROCHLORIDE 50 MG/1
TABLET ORAL
COMMUNITY
Start: 2020-02-09 | End: 2020-08-20

## 2020-03-11 RX ORDER — METHYLPREDNISOLONE ACETATE 80 MG/ML
160 INJECTION, SUSPENSION INTRA-ARTICULAR; INTRALESIONAL; INTRAMUSCULAR; SOFT TISSUE
Status: COMPLETED | OUTPATIENT
Start: 2020-03-11 | End: 2020-03-11

## 2020-03-11 RX ORDER — LIDOCAINE HYDROCHLORIDE 10 MG/ML
2 INJECTION, SOLUTION EPIDURAL; INFILTRATION; INTRACAUDAL; PERINEURAL
Status: COMPLETED | OUTPATIENT
Start: 2020-03-11 | End: 2020-03-11

## 2020-03-11 RX ORDER — IBUPROFEN 800 MG/1
TABLET ORAL
COMMUNITY
Start: 2020-02-17 | End: 2020-08-20

## 2020-03-11 RX ORDER — ONDANSETRON 4 MG/1
TABLET, FILM COATED ORAL
COMMUNITY
Start: 2020-02-10 | End: 2020-08-20

## 2020-03-11 RX ORDER — CIPROFLOXACIN 500 MG/1
500 TABLET, FILM COATED ORAL 2 TIMES DAILY
COMMUNITY
Start: 2020-03-06 | End: 2020-08-20

## 2020-03-11 RX ORDER — PENICILLIN V POTASSIUM 500 MG/1
TABLET ORAL
COMMUNITY
Start: 2020-02-17 | End: 2020-08-20

## 2020-03-11 RX ADMIN — LIDOCAINE HYDROCHLORIDE 2 ML: 10 INJECTION, SOLUTION EPIDURAL; INFILTRATION; INTRACAUDAL; PERINEURAL at 10:17

## 2020-03-11 RX ADMIN — METHYLPREDNISOLONE ACETATE 160 MG: 80 INJECTION, SUSPENSION INTRA-ARTICULAR; INTRALESIONAL; INTRAMUSCULAR; SOFT TISSUE at 10:17

## 2020-03-11 NOTE — PROGRESS NOTES
INJECTION      Patient: Prema Hoang    MRN: [id number]    YOB: 1950    Chief Complaint   Patient presents with   • Right Shoulder - Follow-up, Pain   • Left Shoulder - Pain, Follow-up   • Injections         History of Present Illness:  Prema Hoang is here today for injection therapy. She receives bilateral shoulder injections. Since last injection, patient notes substantial relief of symptoms.  She has not received her injections up for proximally 6 months and states they have worn off quite some time ago. No adverse effects of prior injection. Options have been discussed and she understands and consents.       Physical Exam:   69 y.o. female awake, alert, oriented, in no acute distress and well developed, well nourished.  Bilateral shoulder: Rotator cuff function is impaired. The humeral head is high riding. AC joint is tender and painful for patient. Axillary nerve function is well preserved. There is winging of the scapula with hollowing of the fossae over the proximal and distal aspects of the spine of the scapula. Forward flexion is 0-100 degrees, active abduction is 0-100 degrees. External rotation against resistance is painful and limited for the patient. Skin and soft tissues are essentially normal other than some amounts of swelling. The pain level is 6     Right second finger: Skin is swollen over dorsal and volar aspect of second MCP joint. There is significant tenderness with palpation of the 2nd MCP joint. Flexor and extensor tendon functions are maintained and there is no longer obvious subluxation of the extensor tendon of the second digit. Capillary refill is two seconds with a brisk return. Median nerve function is normal. Radial artery pulse is palpable.    Procedure:  See separate procedure note    Injection site was identified by physical examination and cleaned with Betadine and alcohol swabs. Prior to needle insertion, ethyl chloride spray was used for surface anesthesia.  Sterile technique was used.       ASSESSMENT:  Prema was seen today for injections, follow-up, pain, pain and follow-up.    Diagnoses and all orders for this visit:    Polyarthralgia  -     Nuclear Antigen Antibody, IFA; Future  -     C-reactive Protein; Future  -     Sedimentation Rate; Future  -     Rheumatoid Factor; Future  -     Anti-Smith Antibody; Future  -     Anti-DNA Antibody, Double-stranded; Future  -     RNP Antibodies; Future  -     Sjogren's Antibody, Anti-SS-A / -SS-B; Future  -     Uric Acid; Future  -     CBC & Differential; Future  -     Comprehensive Metabolic Panel; Future    Chronic pain of both shoulders  -     Large Joint Arthrocentesis: L glenohumeral  -     Large Joint Arthrocentesis: R glenohumeral  -     Nuclear Antigen Antibody, IFA; Future  -     C-reactive Protein; Future  -     Sedimentation Rate; Future  -     Rheumatoid Factor; Future  -     Anti-Smith Antibody; Future  -     Anti-DNA Antibody, Double-stranded; Future  -     RNP Antibodies; Future  -     Sjogren's Antibody, Anti-SS-A / -SS-B; Future  -     Uric Acid; Future  -     CBC & Differential; Future  -     Comprehensive Metabolic Panel; Future    Bilateral chronic knee pain  -     Nuclear Antigen Antibody, IFA; Future  -     C-reactive Protein; Future  -     Sedimentation Rate; Future  -     Rheumatoid Factor; Future  -     Anti-Smith Antibody; Future  -     Anti-DNA Antibody, Double-stranded; Future  -     RNP Antibodies; Future  -     Sjogren's Antibody, Anti-SS-A / -SS-B; Future  -     Uric Acid; Future  -     CBC & Differential; Future  -     Comprehensive Metabolic Panel; Future          PLAN:   Orders Placed This Encounter   Procedures   • Large Joint Arthrocentesis: L glenohumeral   • Large Joint Arthrocentesis: R glenohumeral   • Nuclear Antigen Antibody, IFA   • C-reactive Protein   • Sedimentation Rate   • Rheumatoid Factor   • Anti-Smith Antibody   • Anti-DNA Antibody, Double-stranded   • RNP Antibodies   •  Sjogren's Antibody, Anti-SS-A / -SS-B   • Uric Acid   • Comprehensive Metabolic Panel   • CBC & Differential      · Given the inflammation and discomfort in the 2nd MCP joint we discussed possible testing for rheumatologic labs. She wished to proceed with labs and said she would ask her PCP when she goes today, therefore I went ahead and ordered the labs I would like to have performed.  • Bilateral shoulder steroid injection was discussed with the patient. Discussed indication, risks, benefits, and alternatives. Verbal consent was given to proceed with the procedure.   • Injection was performed from anteromedial approach.  Patient tolerated the procedure well and no complications were encountered.  • Discussion of orthopedic goals and activities and patient/guardian expressed understanding.  • Ice, heat, rest, compression and elevation of extremity as beneficial  • nsaids and/or tylenol as beneficial  • Instructed to refrain from heavy activity/rest the extremity for the next 24-48 hours  • Discussion regarding possibility of cortisol flare and what to expect if this occurs  • Watch for signs and symptoms of infection  • Call if adverse effect from injection therapy  • Follow up in 3 months  • Time spent with patient: 10 minutes face-to-face with greater than 50% spent in counseling and coordination of care. This time included discussion regarding shoulder symptoms and treatment, as well as discussion, exam and treatment of left 2nd MCP joint.       Wei Hand PA-C  Encounter Date: 3/11/2020    Electronically signed by Wei Hand PA-C, 03/11/20, 12:08 PM.      EMR Dragon/Transcription disclaimer:  Much of this encounter note is an electronic transcription/translation of spoken language to printed text. The electronic translation of spoken language may permit erroneous, or at times, nonsensical words or phrases to be inadvertently transcribed; Although I have reviewed the note for such errors,  some may still exist.

## 2020-03-11 NOTE — PROGRESS NOTES
Procedure   Large Joint Arthrocentesis: L glenohumeral  Date/Time: 3/11/2020 10:17 AM  Consent given by: patient  Site marked: site marked  Timeout: Immediately prior to procedure a time out was called to verify the correct patient, procedure, equipment, support staff and site/side marked as required   Supporting Documentation  Indications: pain and joint swelling   Procedure Details  Location: shoulder - L glenohumeral  Preparation: Patient was prepped and draped in the usual sterile fashion  Needle size: 25 G  Approach: anteromedial  Medications administered: 2 mL lidocaine PF 1% 1 %; 160 mg methylPREDNISolone acetate 80 MG/ML  Patient tolerance: patient tolerated the procedure well with no immediate complications    Large Joint Arthrocentesis: R glenohumeral  Date/Time: 3/11/2020 10:17 AM  Consent given by: patient  Site marked: site marked  Timeout: Immediately prior to procedure a time out was called to verify the correct patient, procedure, equipment, support staff and site/side marked as required   Supporting Documentation  Indications: pain and joint swelling   Procedure Details  Location: shoulder - R glenohumeral  Preparation: Patient was prepped and draped in the usual sterile fashion  Needle size: 25 G  Approach: anteromedial  Medications administered: 2 mL lidocaine PF 1% 1 %; 160 mg methylPREDNISolone acetate 80 MG/ML  Patient tolerance: patient tolerated the procedure well with no immediate complications      Electronically signed by Wei Hand PA-C, 03/11/20, 12:07 PM.

## 2020-03-17 ENCOUNTER — APPOINTMENT (OUTPATIENT)
Dept: MRI IMAGING | Facility: HOSPITAL | Age: 70
End: 2020-03-17

## 2020-05-18 ENCOUNTER — CLINICAL SUPPORT (OUTPATIENT)
Dept: ORTHOPEDIC SURGERY | Facility: CLINIC | Age: 70
End: 2020-05-18

## 2020-05-18 VITALS — WEIGHT: 150 LBS | TEMPERATURE: 98.4 F | BODY MASS INDEX: 24.99 KG/M2 | HEIGHT: 65 IN

## 2020-05-18 DIAGNOSIS — M17.0 BILATERAL PRIMARY OSTEOARTHRITIS OF KNEE: Primary | ICD-10-CM

## 2020-05-18 PROBLEM — Z90.13 H/O BILATERAL MASTECTOMY: Status: ACTIVE | Noted: 2019-04-24

## 2020-05-18 PROCEDURE — 20610 DRAIN/INJ JOINT/BURSA W/O US: CPT | Performed by: ORTHOPAEDIC SURGERY

## 2020-05-18 RX ORDER — PHENAZOPYRIDINE HYDROCHLORIDE 200 MG/1
TABLET, FILM COATED ORAL
COMMUNITY
Start: 2020-04-24 | End: 2020-08-20

## 2020-05-18 RX ORDER — LIDOCAINE HYDROCHLORIDE 10 MG/ML
2 INJECTION, SOLUTION EPIDURAL; INFILTRATION; INTRACAUDAL; PERINEURAL
Status: COMPLETED | OUTPATIENT
Start: 2020-05-18 | End: 2020-05-18

## 2020-05-18 RX ORDER — CEFDINIR 300 MG/1
CAPSULE ORAL
COMMUNITY
Start: 2020-04-24 | End: 2020-08-20

## 2020-05-18 RX ADMIN — LIDOCAINE HYDROCHLORIDE 2 ML: 10 INJECTION, SOLUTION EPIDURAL; INFILTRATION; INTRACAUDAL; PERINEURAL at 14:15

## 2020-05-18 NOTE — PROGRESS NOTES
INJECTION    Patient: Prema Hoang    YOB: 1950    MRN: [id number]    Chief Complaints: Bilateral knee pain and discomfort.    History of Present Illness: Patient returns today for bilateral knee pain.  Her pain is located over the medial and lateral aspect of the joint.  The pain has been progressive in nature and remains constant.  Her pain is worsened by kneeling, rising after sitting, squatting. There has not been improvement in the past with heat, NSAIDS and injections.     This problem is not new to this examiner.     Allergies:   Allergies   Allergen Reactions   • Propoxyphene GI Intolerance       Medications:   Home Medications:  Current Outpatient Medications on File Prior to Visit   Medication Sig   • aspirin 81 MG tablet Take 81 mg by mouth daily.   • Calcium Carb-Cholecalciferol (CALCIUM 600+D3) 600-200 MG-UNIT tablet Take  by mouth daily.   • cefdinir (OMNICEF) 300 MG capsule TK 2 CS PO QD   • Cholecalciferol (VITAMIN D3) 2000 UNITS capsule Take 2,000 Units by mouth daily.   • ciprofloxacin (CIPRO) 500 MG tablet Take 500 mg by mouth 2 (Two) Times a Day.   • denosumab (PROLIA) 60 MG/ML solution syringe Inject  under the skin 1 (One) Time. Every 6 months   • esomeprazole (NEXIUM) 40 MG capsule TAKE 1 CAPSULE BY MOUTH DAILY   • famciclovir (FAMVIR) 500 MG tablet TAKE 3 TABLETS BY MOUTH ONE TIME AS NEEDED FOR HSV OUTBREAK   • FLUZONE HIGH-DOSE 0.5 ML suspension prefilled syringe injection ADM 0.5ML IM UTD   • Garlic 100 MG tablet Take  by mouth.   • ibuprofen (ADVIL,MOTRIN) 800 MG tablet    • levocetirizine (XYZAL) 5 MG tablet TK 1 T PO D   • meloxicam (MOBIC) 15 MG tablet TK 1 T PO  QD   • metoprolol tartrate (LOPRESSOR) 25 MG tablet Take 25 mg by mouth 2 (Two) Times a Day.   • Multiple Vitamin (MULTI-VITAMINS PO) Take  by mouth daily.   • Multiple Vitamins-Minerals (CENTRUM SILVER 50+WOMEN PO) Take  by mouth.   • ondansetron (ZOFRAN) 4 MG tablet TK 1 T PO Q 8 H   • penicillin v potassium  (VEETID) 500 MG tablet    • phenazopyridine (PYRIDIUM) 200 MG tablet    • Probiotic Product (ALIGN PO) Take  by mouth.   • rosuvastatin (CRESTOR) 10 MG tablet Take 10 mg by mouth Daily.   • Turmeric 500 MG capsule Take  by mouth.   • M-M-R II injection TO BE ADMINISTERED BY PHARMACIST FOR IMMUNIZATION   • MethylPREDNISolone (MEDROL, SHARON,) 4 MG tablet Use as directed by package instructions   • traMADol (ULTRAM) 50 MG tablet TK 1 T PO  UP TO Q  8 H PRF PAIN     No current facility-administered medications on file prior to visit.      Current Medications:  Scheduled Meds:  PRN Meds:.    I have reviewed the patient's medical history in detail and updated the computerized patient record.  Review and summarization of old records include:    Past Medical History:   Diagnosis Date   • Breast cancer (CMS/HCC) 2002, 2007    Lumpectomy with radiation and chemotherapy on the left followed by bilateral mastectomy 2007   • Osteoporosis      Past Surgical History:   Procedure Laterality Date   • BACK SURGERY  2004   • BREAST LUMPECTOMY Left 2002   • COMBINED HYSTERECTOMY VAGINAL / OOPHORECTOMY / A&P REPAIR  2002   • MASTECTOMY Bilateral 2007   • NECK SURGERY  2006     Social History     Occupational History   • Not on file   Tobacco Use   • Smoking status: Former Smoker   • Smokeless tobacco: Never Used   Substance and Sexual Activity   • Alcohol use: Yes     Alcohol/week: 6.0 standard drinks     Types: 6 Glasses of wine per week     Comment: 6 days per week   • Drug use: No   • Sexual activity: Defer      Social History     Social History Narrative   • Not on file     Family History   Problem Relation Age of Onset   • No Known Problems Mother    • Hypertension Father    • Aneurysm Father    • Heart disease Father    • Other Father         mesothelioma   • Cancer Brother         colon, liver       Review of Systems  Constitutional: Negative for appetite change.   HENT: Negative.    Eyes: Negative.    Respiratory: Negative.   "  Cardiovascular: Negative.    Gastrointestinal: Negative.    Endocrine: Negative.    Genitourinary: Negative.    Musculoskeletal: See details of exam below.  Skin: Negative.    Allergic/Immunologic: Negative.    Hematological: Negative.    Psychiatric/Behavioral: Negative.          Wt Readings from Last 3 Encounters:   05/18/20 68 kg (150 lb)   03/11/20 67.9 kg (149 lb 12.8 oz)   12/23/19 68.9 kg (151 lb 12.8 oz)     Ht Readings from Last 3 Encounters:   05/18/20 165.1 cm (65\")   03/11/20 165.1 cm (65\")   12/23/19 165.1 cm (65\")     Body mass index is 24.96 kg/m².  Facility age limit for growth percentiles is 20 years.  Vitals:    05/18/20 1418   Temp: 98.4 °F (36.9 °C)       Physical Exam  Constitutional: Patient is oriented to person, place, and time. Appears well-developed and well-nourished.   HENT:   Head: Normocephalic and atraumatic.   Eyes: Conjunctivae and EOM are normal. Pupils are equal, round, and reactive to light.   Cardiovascular: Normal rate, regular rhythm, normal heart sounds and intact distal pulses.   Pulmonary/Chest: Effort normal and breath sounds normal.   Musculoskeletal:   See detailed exam below   Neurological: Alert and oriented to person, place, and time. No sensory deficit. Coordination normal.   Skin: Skin is warm and dry. Capillary refill takes less than 2 seconds. No rash noted. No erythema.   Psychiatric: Patient has a normal mood and affect. Her behavior is normal. Judgment and thought content normal.   Nursing note and vitals reviewed.    Musculoskeletal:    Bilateral knee (varus). Patient has crepitus throughout range of motion. Positive patellar grind test. Mild effusion. Lachman is negative. Pivot shift is negative. Anterior and posterior drawer signs are negative. Significant joint line tenderness is noted on the medial aspect of the knee. Patient has a varus orientation of the knee. There is fullness and tenderness in the Popliteal fossa. Mild distention of a Popliteal cyst " is noted in this location. Range of motion in flexion is from 0-120 degrees. Neurovascular status is intact.  Dorsalis pedis and posterior tibial artery pulses are palpable. Common peroneal nerve function is well preserved. Patient's gait is cautious and antalgic. Skin and soft tissues are mildly swollen, consistent with synovitis and effusion. The patient has a significant limp with the first few steps after starting the gait cycle. Getting out of a chair takes a lot of effort due to pain on knee flexion.       Diagnostics:      Procedure:  Procedures    Assessment:   Prema was seen today for injections, follow-up, pain, follow-up and pain.    Diagnoses and all orders for this visit:    Bilateral primary osteoarthritis of knee  -     Large Joint Arthrocentesis: L knee  -     Large Joint Arthrocentesis: R knee          Plan:   · Injected patient's bilateral knee joint(s)with Synvisc One from an anterolateral approach   · Compression/brace   · Rest, ice, compression, and elevation (RICE) therapy  · OTC Tylenol 500-1000mg by mouth every 6 hours as needed for pain   · Follow up in 6 month(s).  · Because of increasing pain and discomfort especially on the right knee she is most likely going to end up needing a total knee arthroplasty.  We have discussed that with the patient at length and she will let me know when she is ready for that form of intervention.    Date of encounter: 05/18/2020   Dean Trinidad MD

## 2020-05-18 NOTE — PROGRESS NOTES
Procedure   Large Joint Arthrocentesis: L knee  Date/Time: 5/18/2020 2:15 PM  Consent given by: patient  Site marked: site marked  Timeout: Immediately prior to procedure a time out was called to verify the correct patient, procedure, equipment, support staff and site/side marked as required   Supporting Documentation  Indications: pain and joint swelling   Procedure Details  Location: knee - L knee  Preparation: Patient was prepped and draped in the usual sterile fashion  Needle size: 18 G  Approach: anterolateral  Medications administered: 2 mL lidocaine PF 1% 1 %; 48 mg hylan 48 MG/6ML  Patient tolerance: patient tolerated the procedure well with no immediate complications    Large Joint Arthrocentesis: R knee  Date/Time: 5/18/2020 2:15 PM  Consent given by: patient  Site marked: site marked  Timeout: Immediately prior to procedure a time out was called to verify the correct patient, procedure, equipment, support staff and site/side marked as required   Supporting Documentation  Indications: pain and joint swelling   Procedure Details  Location: knee - R knee  Preparation: Patient was prepped and draped in the usual sterile fashion  Needle size: 18 G  Approach: anterolateral  Medications administered: 2 mL lidocaine PF 1% 1 %; 8 mg/mL Hylan 16 MG/2ML  Patient tolerance: patient tolerated the procedure well with no immediate complications

## 2020-05-22 ENCOUNTER — TELEPHONE (OUTPATIENT)
Dept: ORTHOPEDIC SURGERY | Facility: CLINIC | Age: 70
End: 2020-05-22

## 2020-05-22 NOTE — TELEPHONE ENCOUNTER
Yes, this is correct. Also if the knee becomes swollen we could bring her back in to see if it needs to be aspirated, especially is this was the first time she has received this injection.

## 2020-05-26 ENCOUNTER — TELEPHONE (OUTPATIENT)
Dept: ORTHOPEDIC SURGERY | Facility: CLINIC | Age: 70
End: 2020-05-26

## 2020-05-26 ENCOUNTER — PREP FOR SURGERY (OUTPATIENT)
Dept: OTHER | Facility: HOSPITAL | Age: 70
End: 2020-05-26

## 2020-05-26 ENCOUNTER — HOSPITAL ENCOUNTER (OUTPATIENT)
Dept: GENERAL RADIOLOGY | Facility: HOSPITAL | Age: 70
Discharge: HOME | End: 2020-05-26
Admitting: INTERNAL MEDICINE

## 2020-05-26 DIAGNOSIS — M17.11 PRIMARY OSTEOARTHRITIS OF RIGHT KNEE: Primary | ICD-10-CM

## 2020-05-26 DIAGNOSIS — R52 PAIN: ICD-10-CM

## 2020-05-26 DIAGNOSIS — T14.90XA TRAUMA: ICD-10-CM

## 2020-05-26 PROCEDURE — 73110 X-RAY EXAM OF WRIST: CPT

## 2020-05-26 RX ORDER — ACETAMINOPHEN 500 MG
1000 TABLET ORAL ONCE
Status: CANCELLED | OUTPATIENT
Start: 2020-08-28 | End: 2020-05-26

## 2020-05-26 RX ORDER — PREGABALIN 75 MG/1
150 CAPSULE ORAL ONCE
Status: CANCELLED | OUTPATIENT
Start: 2020-08-28 | End: 2020-05-26

## 2020-05-26 RX ORDER — CEFAZOLIN SODIUM 2 G/100ML
2 INJECTION, SOLUTION INTRAVENOUS ONCE
Status: CANCELLED | OUTPATIENT
Start: 2020-08-28 | End: 2020-05-26

## 2020-05-26 RX ORDER — MELOXICAM 15 MG/1
15 TABLET ORAL ONCE
Status: CANCELLED | OUTPATIENT
Start: 2020-08-28 | End: 2020-05-26

## 2020-05-26 NOTE — TELEPHONE ENCOUNTER
I think she needs to be scheduled for knee replacement.  I will go ahead and put some orders in for her to see if we can get her on the schedule.

## 2020-05-26 NOTE — TELEPHONE ENCOUNTER
Patient called wanting to let JONNATHAN know that she is ready to be scheduled for surgery on the right knee. Patient says that she would like to have surgery at [facility] instead of [facility].

## 2020-05-26 NOTE — TELEPHONE ENCOUNTER
Can you please put in some orders for me to work on this patient's right knee?  She wants to have a right knee replacement done at [facility].  Thank you

## 2020-06-10 ENCOUNTER — HOSPITAL ENCOUNTER (OUTPATIENT)
Dept: OTHER | Facility: HOSPITAL | Age: 70
Discharge: HOME | End: 2020-06-10
Attending: PHYSICIAN ASSISTANT

## 2020-06-10 ENCOUNTER — CLINICAL SUPPORT (OUTPATIENT)
Dept: ORTHOPEDIC SURGERY | Facility: CLINIC | Age: 70
End: 2020-06-10

## 2020-06-10 VITALS — BODY MASS INDEX: 24.99 KG/M2 | WEIGHT: 150 LBS | HEIGHT: 65 IN | TEMPERATURE: 97.1 F

## 2020-06-10 DIAGNOSIS — M12.812 ROTATOR CUFF ARTHROPATHY OF BOTH SHOULDERS: Primary | ICD-10-CM

## 2020-06-10 DIAGNOSIS — M25.561 CHRONIC PAIN OF BOTH KNEES: ICD-10-CM

## 2020-06-10 DIAGNOSIS — M17.11 PRIMARY OSTEOARTHRITIS OF RIGHT KNEE: ICD-10-CM

## 2020-06-10 DIAGNOSIS — G89.29 CHRONIC PAIN OF BOTH KNEES: ICD-10-CM

## 2020-06-10 DIAGNOSIS — M12.811 ROTATOR CUFF ARTHROPATHY OF BOTH SHOULDERS: Primary | ICD-10-CM

## 2020-06-10 DIAGNOSIS — M25.562 CHRONIC PAIN OF BOTH KNEES: ICD-10-CM

## 2020-06-10 PROCEDURE — 20610 DRAIN/INJ JOINT/BURSA W/O US: CPT | Performed by: PHYSICIAN ASSISTANT

## 2020-06-10 PROCEDURE — 99212 OFFICE O/P EST SF 10 MIN: CPT | Performed by: PHYSICIAN ASSISTANT

## 2020-06-10 RX ORDER — LIDOCAINE HYDROCHLORIDE 10 MG/ML
2 INJECTION, SOLUTION EPIDURAL; INFILTRATION; INTRACAUDAL; PERINEURAL
Status: COMPLETED | OUTPATIENT
Start: 2020-06-10 | End: 2020-06-10

## 2020-06-10 RX ORDER — METHYLPREDNISOLONE ACETATE 80 MG/ML
160 INJECTION, SUSPENSION INTRA-ARTICULAR; INTRALESIONAL; INTRAMUSCULAR; SOFT TISSUE
Status: COMPLETED | OUTPATIENT
Start: 2020-06-10 | End: 2020-06-10

## 2020-06-10 RX ADMIN — METHYLPREDNISOLONE ACETATE 160 MG: 80 INJECTION, SUSPENSION INTRA-ARTICULAR; INTRALESIONAL; INTRAMUSCULAR; SOFT TISSUE at 10:11

## 2020-06-10 RX ADMIN — LIDOCAINE HYDROCHLORIDE 2 ML: 10 INJECTION, SOLUTION EPIDURAL; INFILTRATION; INTRACAUDAL; PERINEURAL at 10:11

## 2020-06-10 NOTE — PROGRESS NOTES
INJECTION      Patient: Prema Hoang    MRN: [id number]    YOB: 1950    Chief Complaint   Patient presents with   • Right Shoulder - Follow-up, Pain   • Left Shoulder - Follow-up, Pain         History of Present Illness:  Prema Hoang is here today for injection therapy. She receives bilateral shoulder injections of steroid. Since last injection, she notes substantial relief of symptoms. Her last injections were 3 months ago. She also reports pain in the lateral aspect of the right knee/patellar edge. She has not had xrays of the knees recently and would like to do so today. She is in the process of getting scheduled for her right knee replacement. Treatment options have been discussed and she understands and consents.       Physical Exam:   70 y.o. female awake, alert, oriented, in no acute distress and well developed, well nourished.  Rotator cuff function is moderately impaired.   Positive for a high riding humeral head.  Positive for tenderness with palpation of AC joint.   Range of motion: forward flexion 0-100 degrees, active abduction 0-100 degrees.   Positive for pain and limitation with external rotation against resistance.   Skin and soft tissues are within normal limits.  The pain level is 7.  Axillary nerve function is well preserved.   Neurovascular status is intact.    There is significant tenderness at the lateral aspect/lateral edge of the patella of the knee.   The knee has a varus orientation.   Positive for crepitus throughout range of motion.   Positive for minimal effusion.  Findings are consistent with synovitis and effusion.    Positive patellar grind test.   Negative Lachman test.    Negative anterior and posterior drawer.  Range of motion in extension and flexion is: 0-110 degrees.  Neurovascular status is intact.  Dorsalis pedis and posterior tibial artery pulses are palpable. Common peroneal nerve function is well preserved.   Gait is cautious and antalgic.      Procedures  See separate procedure note  Injection site was identified by physical examination and cleaned with Betadine and alcohol swabs. Prior to needle insertion, ethyl chloride spray was used for surface anesthesia. Sterile technique was used.       Diagnostic data:  Bilateral knee 3view xrays were ordered by Wei Hand PA-C and performed at [facility]. These images were independently viewed and interpreted by myself, my impression as follows:   Findings: Right knee: degenerative changes throughout, moderate narrowing in the medial joint space, narrowing of the patellofemoral compartment. Left knee: moderate degenerative change of the patellofemoral compartment, significant narrowing of the medial joint space, there is a calcified fragment located in the posterior aspect of the joint  Bony lesion: no  Soft tissues: subnormal  Joint spaces: decreased  Hardware appropriately positioned: not applicable  Prior studies available for comparison: yes from 9/25/20        ASSESSMENT:  Prema was seen today for follow-up, pain, follow-up and pain.    Diagnoses and all orders for this visit:    Rotator cuff arthropathy of both shoulders  -     Large Joint Arthrocentesis: R glenohumeral  -     Large Joint Arthrocentesis: L glenohumeral    Chronic pain of both knees  -     XR Knee 3 View Bilateral; Future          PLAN:   • Bilateral shoulder steroid injection was discussed with the patient. Discussed indication, risks, benefits, and alternatives. Verbal consent was given to proceed with the procedure.   • Injection was performed from anteromedial approach.  Patient tolerated the procedure well and no complications were encountered.  • Discussion of orthopedic goals and activities and patient/guardian expressed understanding.  • Ice, heat, rest, compression and elevation of extremity as beneficial  • nsaids and/or tylenol as beneficial  • Instructed to refrain from heavy activity/rest the  extremity for the next 24-48 hours  • Discussion regarding possibility of cortisol flare and what to expect if this occurs  • Watch for signs and symptoms of infection  • Call if adverse effect from injection therapy   • Ordered bilateral knee x-rays to evaluate progression of arthritis.  Patient is concerned that although she is planning to do surgery on the right knee that the left knee may need it more.  • Follow up in 3 months  • Time spent with patient: 12 minutes face-to-face with greater than 50% spent in counseling and coordination of care.  This time included discussion and evaluation of pain in the right knee, bilateral shoulder pain, and how to best proceed in regards to treatment.      Wei Hand PA-C  Encounter Date: 6/10/2020    Electronically signed by Wei Hand PA-C, 06/10/20, 5:07 PM.      EMR Dragon/Transcription disclaimer:  Much of this encounter note is an electronic transcription/translation of spoken language to printed text. The electronic translation of spoken language may permit erroneous, or at times, nonsensical words or phrases to be inadvertently transcribed; Although I have reviewed the note for such errors, some may still exist.

## 2020-06-10 NOTE — PROGRESS NOTES
Procedure   Large Joint Arthrocentesis: R glenohumeral  Date/Time: 6/10/2020 10:11 AM  Consent given by: patient  Site marked: site marked  Timeout: Immediately prior to procedure a time out was called to verify the correct patient, procedure, equipment, support staff and site/side marked as required   Supporting Documentation  Indications: pain   Procedure Details  Location: shoulder - R glenohumeral  Preparation: Patient was prepped and draped in the usual sterile fashion  Needle size: 25 G  Approach: anteromedial  Medications administered: 2 mL lidocaine PF 1% 1 %; 160 mg methylPREDNISolone acetate 80 MG/ML  Patient tolerance: patient tolerated the procedure well with no immediate complications    Large Joint Arthrocentesis: L glenohumeral  Date/Time: 6/10/2020 10:11 AM  Consent given by: patient  Site marked: site marked  Timeout: Immediately prior to procedure a time out was called to verify the correct patient, procedure, equipment, support staff and site/side marked as required   Supporting Documentation  Indications: pain   Procedure Details  Location: shoulder - L glenohumeral  Preparation: Patient was prepped and draped in the usual sterile fashion  Needle size: 25 G  Approach: anteromedial  Medications administered: 2 mL lidocaine PF 1% 1 %; 160 mg methylPREDNISolone acetate 80 MG/ML  Patient tolerance: patient tolerated the procedure well with no immediate complications      Electronically signed by Wei Hand PA-C, 06/10/20, 5:02 PM.

## 2020-06-16 PROBLEM — M17.11 PRIMARY OSTEOARTHRITIS OF RIGHT KNEE: Status: ACTIVE | Noted: 2020-06-16

## 2020-06-17 ENCOUNTER — TELEPHONE (OUTPATIENT)
Dept: ORTHOPEDIC SURGERY | Facility: CLINIC | Age: 70
End: 2020-06-17

## 2020-06-17 NOTE — TELEPHONE ENCOUNTER
Could you notify Ms. Hoang that I have reviewed her xrays of the knees--left knee appears a little more arthritic than the right. The left knee shows some narrowing on the inside of the knee and moderate arthritis changes behind the knee cap. The right knee reveals some narrowing on the inside of the knee but overall the rest of the joint spaces look fairly good.

## 2020-06-17 NOTE — TELEPHONE ENCOUNTER
I would most certainly not recommend that for her.  Yes they can technically be replaced at the same time under the same anesthetic.  However this increases the complication rate significantly and in this day and age with COVID-19 I would recommend to proceed with the most symptomatic knee first.  This would be followed by 2 to 3 months of rehabilitation and if the other knee is still bothering her she can continue to consider that surgical intervention subsequently.  Please call the patient and let her know about a more conservative approach with a unilateral surgery one knee at a time.  Thank you

## 2020-06-17 NOTE — TELEPHONE ENCOUNTER
So yes I would recommend that the patient have her tooth issue taking care off prior to undergoing the knee replacement surgery.  I do feel that August 4 for the dental procedure in August 20 for the knee replacement should give her enough time to recover from the dental procedure.  Please call the patient and let her know thank you

## 2020-06-18 NOTE — TELEPHONE ENCOUNTER
Spoke to the patient and told her to proceed with the symptomatic knee first.  She states that the most symptomatic knee continues to be the right knee at this time.  And all the posting work for the tooth implant has already been performed.  They will be applying the tooth on August 24th.  I have also advised the patient is she decides differently on which knee to let Kandice in [place] know.  I have given her Kandice's direct line.  I also advised her following the procedure of dental prophylaxis treatment in the future.

## 2020-07-02 ENCOUNTER — TELEPHONE (OUTPATIENT)
Dept: ORTHOPEDIC SURGERY | Facility: CLINIC | Age: 70
End: 2020-07-02

## 2020-07-02 NOTE — TELEPHONE ENCOUNTER
Yes I think so.  This is of course if there are no complications or side effects from the surgical intervention.  In general she should be able to fly 2 months postop.  Please call her and let her know this thanks thank you

## 2020-08-12 ENCOUNTER — TRANSCRIBE ORDERS (OUTPATIENT)
Dept: ADMINISTRATIVE | Facility: HOSPITAL | Age: 70
End: 2020-08-12

## 2020-08-12 DIAGNOSIS — R07.9 CHEST PAIN, UNSPECIFIED TYPE: Primary | ICD-10-CM

## 2020-08-12 DIAGNOSIS — R59.0 LOCALIZED ENLARGED LYMPH NODES: ICD-10-CM

## 2020-08-18 ENCOUNTER — TRANSCRIBE ORDERS (OUTPATIENT)
Dept: PREADMISSION TESTING | Facility: HOSPITAL | Age: 70
End: 2020-08-18

## 2020-08-18 ENCOUNTER — HOSPITAL ENCOUNTER (OUTPATIENT)
Dept: CT IMAGING | Facility: HOSPITAL | Age: 70
Discharge: HOME | End: 2020-08-18
Admitting: INTERNAL MEDICINE

## 2020-08-18 DIAGNOSIS — Z01.818 OTHER SPECIFIED PRE-OPERATIVE EXAMINATION: Primary | ICD-10-CM

## 2020-08-18 DIAGNOSIS — R59.0 LOCALIZED ENLARGED LYMPH NODES: ICD-10-CM

## 2020-08-18 DIAGNOSIS — R07.9 CHEST PAIN, UNSPECIFIED TYPE: ICD-10-CM

## 2020-08-18 LAB — CREAT BLDA-MCNC: 0.8 MG/DL (ref 0.6–1.3)

## 2020-08-18 PROCEDURE — 71260 CT THORAX DX C+: CPT

## 2020-08-18 PROCEDURE — 82565 ASSAY OF CREATININE: CPT

## 2020-08-18 PROCEDURE — 25010000002 IOPAMIDOL 61 % SOLUTION: Performed by: INTERNAL MEDICINE

## 2020-08-18 RX ADMIN — IOPAMIDOL 75 ML: 612 INJECTION, SOLUTION INTRAVENOUS at 15:30

## 2020-08-20 ENCOUNTER — APPOINTMENT (OUTPATIENT)
Dept: PREADMISSION TESTING | Facility: HOSPITAL | Age: 70
End: 2020-08-20

## 2020-08-20 ENCOUNTER — HOSPITAL ENCOUNTER (OUTPATIENT)
Dept: GENERAL RADIOLOGY | Facility: HOSPITAL | Age: 70
Discharge: HOME | End: 2020-08-20
Admitting: ORTHOPAEDIC SURGERY

## 2020-08-20 ENCOUNTER — TELEPHONE (OUTPATIENT)
Dept: ORTHOPEDIC SURGERY | Facility: CLINIC | Age: 70
End: 2020-08-20

## 2020-08-20 VITALS
TEMPERATURE: 98.8 F | OXYGEN SATURATION: 98 % | SYSTOLIC BLOOD PRESSURE: 144 MMHG | DIASTOLIC BLOOD PRESSURE: 94 MMHG | RESPIRATION RATE: 18 BRPM | HEIGHT: 65 IN | HEART RATE: 73 BPM | WEIGHT: 154 LBS | BODY MASS INDEX: 25.66 KG/M2

## 2020-08-20 DIAGNOSIS — M17.11 PRIMARY OSTEOARTHRITIS OF RIGHT KNEE: ICD-10-CM

## 2020-08-20 LAB
ABO GROUP BLD: NORMAL
BLD GP AB SCN SERPL QL: NEGATIVE
RH BLD: POSITIVE
T&S EXPIRATION DATE: NORMAL

## 2020-08-20 PROCEDURE — 86901 BLOOD TYPING SEROLOGIC RH(D): CPT | Performed by: ORTHOPAEDIC SURGERY

## 2020-08-20 PROCEDURE — A9270 NON-COVERED ITEM OR SERVICE: HCPCS | Performed by: ORTHOPAEDIC SURGERY

## 2020-08-20 PROCEDURE — 86900 BLOOD TYPING SEROLOGIC ABO: CPT | Performed by: ORTHOPAEDIC SURGERY

## 2020-08-20 PROCEDURE — 93005 ELECTROCARDIOGRAM TRACING: CPT

## 2020-08-20 PROCEDURE — 73560 X-RAY EXAM OF KNEE 1 OR 2: CPT

## 2020-08-20 PROCEDURE — 93010 ELECTROCARDIOGRAM REPORT: CPT | Performed by: INTERNAL MEDICINE

## 2020-08-20 PROCEDURE — 36415 COLL VENOUS BLD VENIPUNCTURE: CPT

## 2020-08-20 PROCEDURE — 63710000001 MUPIROCIN 2 % OINTMENT: Performed by: ORTHOPAEDIC SURGERY

## 2020-08-20 PROCEDURE — 86850 RBC ANTIBODY SCREEN: CPT | Performed by: ORTHOPAEDIC SURGERY

## 2020-08-20 RX ORDER — VITAMIN B COMPLEX
1 TABLET ORAL DAILY
COMMUNITY

## 2020-08-20 RX ORDER — CHLORHEXIDINE GLUCONATE 500 MG/1
CLOTH TOPICAL
COMMUNITY
End: 2020-08-29 | Stop reason: HOSPADM

## 2020-08-20 RX ORDER — CETIRIZINE HYDROCHLORIDE 10 MG/1
10 TABLET ORAL DAILY
COMMUNITY
End: 2022-11-21

## 2020-08-20 ASSESSMENT — KOOS JR
KOOS JR SCORE: 21
KOOS JR SCORE: 34.174

## 2020-08-20 NOTE — DISCHARGE INSTRUCTIONS
ARRIVE DAY OF SURGERY AT 5:30 AM TO MAIN SURGERY        Take the following medications the morning of surgery: NEXIUM & METOPROLOL      If you are on prescription narcotic pain medication to control your pain you may also take that medication the morning of surgery.    General Instructions:  • Do not eat solid food after midnight the night before surgery.  • You may drink clear liquids day of surgery but must stop at least one hour before your hospital arrival time.  • It is beneficial for you to have a clear drink that contains carbohydrates the day of surgery.  We suggest a 12 to 20 ounce bottle of Gatorade or Powerade for non-diabetic patients or a 12 to 20 ounce bottle of G2 or Powerade Zero for diabetic patients. (Pediatric patients, are not advised to drink a 12 to 20 ounce carbohydrate drink)    Clear liquids are liquids you can see through.  Nothing red in color.     Plain water                               Sports drinks  Sodas                                   Gelatin (Jell-O)  Fruit juices without pulp such as white grape juice and apple juice  Popsicles that contain no fruit or yogurt  Tea or coffee (no cream or milk added)  Gatorade / Powerade  G2 / Powerade Zero    • Infants may have breast milk up to four hours before surgery.  • Infants drinking formula may drink formula up to six hours before surgery.   • Patients who avoid smoking, chewing tobacco and alcohol for 4 weeks prior to surgery have a reduced risk of post-operative complications.  Quit smoking as many days before surgery as you can.  • Do not smoke, use chewing tobacco or drink alcohol the day of surgery.   • If applicable bring your C-PAP/ BI-PAP machine.  • Bring any papers given to you in the doctor’s office.  • Wear clean comfortable clothes.  • Do not wear contact lenses, false eyelashes or make-up.  Bring a case for your glasses.   • Bring crutches or walker if applicable.  • Remove all piercings.  Leave jewelry and any other  valuables at home.  • Hair extensions with metal clips must be removed prior to surgery.  • The Pre-Admission Testing nurse will instruct you to bring medications if unable to obtain an accurate list in Pre-Admission Testing.        If you were given a blood bank ID arm band remember to bring it with you the day of surgery.    Preventing a Surgical Site Infection:  • For 2 to 3 days before surgery, avoid shaving with a razor because the razor can irritate skin and make it easier to develop an infection.    • Any areas of open skin can increase the risk of a post-operative wound infection by allowing bacteria to enter and travel throughout the body.  Notify your surgeon if you have any skin wounds / rashes even if it is not near the expected surgical site.  The area will need assessed to determine if surgery should be delayed until it is healed.  • The night prior to surgery shower using a fresh bar of anti-bacterial soap (such as Dial) and clean washcloth.  Sleep in a clean bed with clean clothing.  Do not allow pets to sleep with you.  • Shower on the morning of surgery using a fresh bar of anti-bacterial soap (such as Dial) and clean washcloth.  Dry with a clean towel and dress in clean clothing.  • Ask your surgeon if you will be receiving antibiotics prior to surgery.  • Make sure you, your family, and all healthcare providers clean their hands with soap and water or an alcohol based hand  before caring for you or your wound.    Day of surgery:  Your arrival time is approximately two hours before your scheduled surgery time.  Upon arrival, a Pre-op nurse and Anesthesiologist will review your health history, obtain vital signs, and answer questions you may have.  The only belongings needed at this time will be a list of your home medications and if applicable your C-PAP/BI-PAP machine.  If you are staying overnight your family can leave the rest of your belongings in the car and bring them to your room  later.  A Pre-op nurse will start an IV and you may receive medication in preparation for surgery, including something to help you relax.  Your family will be able to see you in the Pre-op area.  Two visitors at a time will be allowed in the Pre-op room.  While you are in surgery your family should notify the waiting room  if they leave the waiting room area and provide a contact phone number.    Please be aware that surgery does come with discomfort.  We want to make every effort to control your discomfort so please discuss any uncontrolled symptoms with your nurse.   Your doctor will most likely have prescribed pain medications.      If you are going home after surgery you will receive individualized written care instructions before being discharged.  A responsible adult must drive you to and from the hospital on the day of your surgery and stay with you for 24 hours.    If you are staying overnight following surgery, you will be transported to your hospital room following the recovery period.  [facility] has all private rooms.    If you have any questions please call Pre-Admission Testing at [phone].  Deductibles and co-payments are collected on the day of service. Please be prepared to pay the required co-pay, deductible or deposit on the day of service as defined by your plan.    Patient Education for Self-Quarantine Process    Following your COVID testing, we strongly recommend that you do not leave your home after you have been tested for COVID except to get medical care. This includes not going to work, school or to public areas.  If this is not possible for you to do please limit your activities to only required outings.  Be sure to wear a mask when you are with other people, practice social distancing and wash your hands frequently.      The following items provide additional details to keep you safe.  • Wash your hands with soap and water frequently for at least 20  seconds.   • Avoid touching your eyes, nose and mouth with unwashed hands.  • Do not share anything - utensils, towels, food from the same bowl.   • Have your own utensils, drinking glass, dishes, towels and bedding.   • Do not have visitors.   • Do use FaceTime to stay in touch with family and friends.  • You should stay in a specific room away from others if possible.   • Stay at least 6 feet away from others in the home if you cannot have a dedicated room to yourself.   • Do not snuggle with your pet. While the CDC says there is no evidence that pets can spread COVID-19 or be infected from humans, it is probably best to avoid “petting, snuggling, being kissed or licked and sharing food (during self-quarantine)”, according to the CDC.   • Sanitize household surfaces daily. Include all high touch areas (door handles, light switches, phones, countertops, etc.)  • Do not share a bathroom with others, if possible.   • Wear a mask around others in your home if you are unable to stay in a separate room or 6 feet apart. If  you are unable to wear a mask, have your family member wear a mask if they must be within 6 feet of you.   Call your surgeon immediately if you experience any of the following symptoms:  • Sore Throat  • Shortness of Breath or difficulty breathing  • Cough  • Chills  • Body soreness or muscle pain  • Headache  • Fever  • New loss of taste or smell  • Do not arrive for your surgery ill.  Your procedure will need to be rescheduled to another time.  You will need to call your physician before the day of surgery to avoid any unnecessary exposure to hospital staff as well as other patients.    CHLORHEXIDINE CLOTH INSTRUCTIONS  The morning of surgery follow these instructions using the Chlorhexidine cloths you've been given.  These steps reduce bacteria on the body.  Do not use the cloths near your eyes, ears mouth, genitalia or on open wounds.  Throw the cloths away after use but do not try to flush them  down a toilet.      • Open and remove one cloth at a time from the package.    • Leave the cloth unfolded and begin the bathing.  • Massage the skin with the cloths using gentle pressure to remove bacteria.  Do not scrub harshly.   • Follow the steps below with one 2% CHG cloth per area (6 total cloths).  • One cloth for neck, shoulders and chest.  • One cloth for both arms, hands, fingers and underarms (do underarms last).  • One cloth for the abdomen followed by groin.  • One cloth for right leg and foot including between the toes.  • One cloth for left leg and foot including between the toes.  • The last cloth is to be used for the back of the neck, back and buttocks.    Allow the CHG to air dry 3 minutes on the skin which will give it time to work and decrease the chance of irritation.  The skin may feel sticky until it is dry.  Do not rinse with water or any other liquid or you will lose the beneficial effects of the CHG.  If mild skin irritation occurs, do rinse the skin to remove the CHG.  Report this to the nurse at time of admission.  Do not apply lotions, creams, ointments, deodorants or perfumes after using the clothes. Dress in clean clothes before coming to the hospital.    BACTROBAN NASAL OINTMENT  There are many germs normally in your nose. Bactroban is an ointment that will help reduce these germs. Please follow these instructions for Bactroban use:      ____The day before surgery in the morning  Date________    ____The day before surgery in the evening              Date________    ____The day of surgery in the morning    Date________    **Squirt ½ package of Bactroban Ointment onto a cotton applicator and apply to inside of 1st nostril.  Squirt the remaining Bactroban and apply to the inside of the other nostril.

## 2020-08-20 NOTE — TELEPHONE ENCOUNTER
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  Previous EKG from 2018 is unchanged.  Patient has had a negative stress test.  She is okay to proceed with surgery

## 2020-08-26 ENCOUNTER — LAB (OUTPATIENT)
Dept: LAB | Facility: HOSPITAL | Age: 70
End: 2020-08-26

## 2020-08-26 DIAGNOSIS — Z01.818 OTHER SPECIFIED PRE-OPERATIVE EXAMINATION: ICD-10-CM

## 2020-08-26 PROCEDURE — C9803 HOPD COVID-19 SPEC COLLECT: HCPCS

## 2020-08-26 PROCEDURE — U0004 COV-19 TEST NON-CDC HGH THRU: HCPCS

## 2020-08-27 LAB
REF LAB TEST METHOD: NORMAL
SARS-COV-2 RNA RESP QL NAA+PROBE: NOT DETECTED

## 2020-08-28 ENCOUNTER — HOSPITAL ENCOUNTER (OUTPATIENT)
Facility: HOSPITAL | Age: 70
Discharge: HOME HEALTH | End: 2020-08-29
Attending: ORTHOPAEDIC SURGERY | Admitting: ORTHOPAEDIC SURGERY

## 2020-08-28 ENCOUNTER — ANESTHESIA EVENT (OUTPATIENT)
Dept: PERIOP | Facility: HOSPITAL | Age: 70
End: 2020-08-28

## 2020-08-28 ENCOUNTER — APPOINTMENT (OUTPATIENT)
Dept: GENERAL RADIOLOGY | Facility: HOSPITAL | Age: 70
End: 2020-08-28

## 2020-08-28 ENCOUNTER — ANESTHESIA (OUTPATIENT)
Dept: PERIOP | Facility: HOSPITAL | Age: 70
End: 2020-08-28

## 2020-08-28 DIAGNOSIS — M17.11 PRIMARY OSTEOARTHRITIS OF RIGHT KNEE: ICD-10-CM

## 2020-08-28 PROBLEM — R11.10 VOMITING: Status: ACTIVE | Noted: 2020-08-28

## 2020-08-28 LAB
ALBUMIN SERPL-MCNC: 3.3 G/DL (ref 3.5–5.2)
ALBUMIN/GLOB SERPL: 1.4 G/DL
ALP SERPL-CCNC: 58 U/L (ref 39–117)
ALT SERPL W P-5'-P-CCNC: 30 U/L (ref 1–33)
ANION GAP SERPL CALCULATED.3IONS-SCNC: 11 MMOL/L (ref 5–15)
AST SERPL-CCNC: 32 U/L (ref 1–32)
BILIRUB SERPL-MCNC: 0.2 MG/DL (ref 0–1.2)
BUN SERPL-MCNC: 22 MG/DL (ref 8–23)
BUN/CREAT SERPL: 31 (ref 7–25)
CALCIUM SPEC-SCNC: 9 MG/DL (ref 8.6–10.5)
CHLORIDE SERPL-SCNC: 106 MMOL/L (ref 98–107)
CO2 SERPL-SCNC: 21 MMOL/L (ref 22–29)
CREAT SERPL-MCNC: 0.71 MG/DL (ref 0.57–1)
GFR SERPL CREATININE-BSD FRML MDRD: 81 ML/MIN/1.73
GLOBULIN UR ELPH-MCNC: 2.3 GM/DL
GLUCOSE SERPL-MCNC: 141 MG/DL (ref 65–99)
POTASSIUM SERPL-SCNC: 4.4 MMOL/L (ref 3.5–5.2)
PROT SERPL-MCNC: 5.6 G/DL (ref 6–8.5)
SODIUM SERPL-SCNC: 138 MMOL/L (ref 136–145)
TROPONIN T SERPL-MCNC: <0.01 NG/ML (ref 0–0.03)

## 2020-08-28 PROCEDURE — 63710000001 LACTOBACILLUS ACIDOPHILUS CAPSULE: Performed by: ORTHOPAEDIC SURGERY

## 2020-08-28 PROCEDURE — C1776 JOINT DEVICE (IMPLANTABLE): HCPCS | Performed by: ORTHOPAEDIC SURGERY

## 2020-08-28 PROCEDURE — 63710000001 DOCUSATE SODIUM 100 MG CAPSULE: Performed by: ORTHOPAEDIC SURGERY

## 2020-08-28 PROCEDURE — 63710000001 METOPROLOL TARTRATE 25 MG TABLET: Performed by: ORTHOPAEDIC SURGERY

## 2020-08-28 PROCEDURE — 25010000002 PHENYLEPHRINE PER 1 ML: Performed by: NURSE ANESTHETIST, CERTIFIED REGISTERED

## 2020-08-28 PROCEDURE — 97110 THERAPEUTIC EXERCISES: CPT

## 2020-08-28 PROCEDURE — 73560 X-RAY EXAM OF KNEE 1 OR 2: CPT

## 2020-08-28 PROCEDURE — A9270 NON-COVERED ITEM OR SERVICE: HCPCS | Performed by: ORTHOPAEDIC SURGERY

## 2020-08-28 PROCEDURE — G0378 HOSPITAL OBSERVATION PER HR: HCPCS

## 2020-08-28 PROCEDURE — 63710000001 CETIRIZINE 10 MG TABLET: Performed by: ORTHOPAEDIC SURGERY

## 2020-08-28 PROCEDURE — 25010000002 ROPIVACAINE PER 1 MG: Performed by: ANESTHESIOLOGY

## 2020-08-28 PROCEDURE — 25010000002 ONDANSETRON PER 1 MG: Performed by: ORTHOPAEDIC SURGERY

## 2020-08-28 PROCEDURE — 25010000002 ONDANSETRON PER 1 MG: Performed by: NURSE ANESTHETIST, CERTIFIED REGISTERED

## 2020-08-28 PROCEDURE — 27447 TOTAL KNEE ARTHROPLASTY: CPT | Performed by: ORTHOPAEDIC SURGERY

## 2020-08-28 PROCEDURE — 63710000001 ROSUVASTATIN 10 MG TABLET: Performed by: ORTHOPAEDIC SURGERY

## 2020-08-28 PROCEDURE — 25010000003 CEFAZOLIN IN DEXTROSE 2-4 GM/100ML-% SOLUTION: Performed by: ORTHOPAEDIC SURGERY

## 2020-08-28 PROCEDURE — 25010000003 BUPIVACAINE LIPOSOME 1.3 % SUSPENSION 20 ML VIAL: Performed by: ORTHOPAEDIC SURGERY

## 2020-08-28 PROCEDURE — 25010000002 HYDROMORPHONE PER 4 MG: Performed by: NURSE ANESTHETIST, CERTIFIED REGISTERED

## 2020-08-28 PROCEDURE — 63710000001 ACETAMINOPHEN 500 MG TABLET: Performed by: ORTHOPAEDIC SURGERY

## 2020-08-28 PROCEDURE — 25010000002 NEOSTIGMINE PER 0.5 MG: Performed by: NURSE ANESTHETIST, CERTIFIED REGISTERED

## 2020-08-28 PROCEDURE — C1713 ANCHOR/SCREW BN/BN,TIS/BN: HCPCS | Performed by: ORTHOPAEDIC SURGERY

## 2020-08-28 PROCEDURE — 63710000001 MUPIROCIN 2 % OINTMENT: Performed by: ORTHOPAEDIC SURGERY

## 2020-08-28 PROCEDURE — 25010000002 KETOROLAC TROMETHAMINE PER 15 MG: Performed by: ORTHOPAEDIC SURGERY

## 2020-08-28 PROCEDURE — S0260 H&P FOR SURGERY: HCPCS | Performed by: ORTHOPAEDIC SURGERY

## 2020-08-28 PROCEDURE — 63710000001 PREGABALIN 75 MG CAPSULE: Performed by: ORTHOPAEDIC SURGERY

## 2020-08-28 PROCEDURE — 25010000002 PROPOFOL 10 MG/ML EMULSION: Performed by: NURSE ANESTHETIST, CERTIFIED REGISTERED

## 2020-08-28 PROCEDURE — 97161 PT EVAL LOW COMPLEX 20 MIN: CPT

## 2020-08-28 PROCEDURE — 25010000002 DEXAMETHASONE PER 1 MG: Performed by: NURSE ANESTHETIST, CERTIFIED REGISTERED

## 2020-08-28 PROCEDURE — 80053 COMPREHEN METABOLIC PANEL: CPT | Performed by: HOSPITALIST

## 2020-08-28 PROCEDURE — 25010000002 MIDAZOLAM PER 1 MG: Performed by: ANESTHESIOLOGY

## 2020-08-28 PROCEDURE — 25010000002 FENTANYL CITRATE (PF) 100 MCG/2ML SOLUTION: Performed by: ANESTHESIOLOGY

## 2020-08-28 PROCEDURE — 76942 ECHO GUIDE FOR BIOPSY: CPT | Performed by: ORTHOPAEDIC SURGERY

## 2020-08-28 PROCEDURE — 25010000003 CEFAZOLIN PER 500 MG: Performed by: ORTHOPAEDIC SURGERY

## 2020-08-28 PROCEDURE — 25010000002 ROPIVACAINE PER 1 MG: Performed by: ORTHOPAEDIC SURGERY

## 2020-08-28 PROCEDURE — 63710000001 POLYETHYLENE GLYCOL 17 G PACK: Performed by: ORTHOPAEDIC SURGERY

## 2020-08-28 PROCEDURE — 63710000001 MELOXICAM 15 MG TABLET: Performed by: ORTHOPAEDIC SURGERY

## 2020-08-28 PROCEDURE — 25010000002 FENTANYL CITRATE (PF) 100 MCG/2ML SOLUTION: Performed by: NURSE ANESTHETIST, CERTIFIED REGISTERED

## 2020-08-28 PROCEDURE — 93005 ELECTROCARDIOGRAM TRACING: CPT | Performed by: HOSPITALIST

## 2020-08-28 PROCEDURE — C9290 INJ, BUPIVACAINE LIPOSOME: HCPCS | Performed by: ORTHOPAEDIC SURGERY

## 2020-08-28 PROCEDURE — 84484 ASSAY OF TROPONIN QUANT: CPT | Performed by: HOSPITALIST

## 2020-08-28 PROCEDURE — 93010 ELECTROCARDIOGRAM REPORT: CPT | Performed by: INTERNAL MEDICINE

## 2020-08-28 DEVICE — DEV WND/CLS CONTRL TISS STRATAFIX SPIRAL PDS PLS CT1 0 30CM: Type: IMPLANTABLE DEVICE | Site: KNEE | Status: FUNCTIONAL

## 2020-08-28 DEVICE — COMP FEM/KN PERSONA CR CMT NRW SZ9 RT: Type: IMPLANTABLE DEVICE | Site: KNEE | Status: FUNCTIONAL

## 2020-08-28 DEVICE — IMPLANTABLE DEVICE: Type: IMPLANTABLE DEVICE | Site: KNEE | Status: FUNCTIONAL

## 2020-08-28 DEVICE — STEM TIB/KN PERSONA CMT 5D SZE RT: Type: IMPLANTABLE DEVICE | Site: KNEE | Status: FUNCTIONAL

## 2020-08-28 DEVICE — CMT BONE REFOBACIN R W/GENT 1X40: Type: IMPLANTABLE DEVICE | Site: KNEE | Status: FUNCTIONAL

## 2020-08-28 DEVICE — CAP BEAR KN VE UPCHRG: Type: IMPLANTABLE DEVICE | Site: KNEE | Status: FUNCTIONAL

## 2020-08-28 DEVICE — CAP TOTL KN CMT PREMIUM: Type: IMPLANTABLE DEVICE | Site: KNEE | Status: FUNCTIONAL

## 2020-08-28 DEVICE — DEV WND/CLS CONTRL TISS STRATAFIX SPIRAL PDS PLUS CT-1 15CM: Type: IMPLANTABLE DEVICE | Site: KNEE | Status: FUNCTIONAL

## 2020-08-28 DEVICE — CAP EXT STEM KN UPCHRG: Type: IMPLANTABLE DEVICE | Site: KNEE | Status: FUNCTIONAL

## 2020-08-28 DEVICE — EXT STEM FEM/KN PERSONA TPR 14XPLS30MM: Type: IMPLANTABLE DEVICE | Site: KNEE | Status: FUNCTIONAL

## 2020-08-28 DEVICE — ART/SRF KN PERSONA/VE MC EF 8TO11 10MM RT: Type: IMPLANTABLE DEVICE | Site: KNEE | Status: FUNCTIONAL

## 2020-08-28 RX ORDER — DEXAMETHASONE SODIUM PHOSPHATE 10 MG/ML
INJECTION INTRAMUSCULAR; INTRAVENOUS AS NEEDED
Status: DISCONTINUED | OUTPATIENT
Start: 2020-08-28 | End: 2020-08-28 | Stop reason: SURG

## 2020-08-28 RX ORDER — MELOXICAM 15 MG/1
15 TABLET ORAL DAILY
Status: DISCONTINUED | OUTPATIENT
Start: 2020-08-29 | End: 2020-08-28

## 2020-08-28 RX ORDER — PROMETHAZINE HYDROCHLORIDE 25 MG/1
25 SUPPOSITORY RECTAL ONCE AS NEEDED
Status: DISCONTINUED | OUTPATIENT
Start: 2020-08-28 | End: 2020-08-28 | Stop reason: HOSPADM

## 2020-08-28 RX ORDER — OXYCODONE HYDROCHLORIDE AND ACETAMINOPHEN 5; 325 MG/1; MG/1
1 TABLET ORAL EVERY 4 HOURS PRN
Status: DISCONTINUED | OUTPATIENT
Start: 2020-08-28 | End: 2020-08-28

## 2020-08-28 RX ORDER — FENTANYL CITRATE 50 UG/ML
50 INJECTION, SOLUTION INTRAMUSCULAR; INTRAVENOUS
Status: DISCONTINUED | OUTPATIENT
Start: 2020-08-28 | End: 2020-08-28 | Stop reason: HOSPADM

## 2020-08-28 RX ORDER — FLUMAZENIL 0.1 MG/ML
0.2 INJECTION INTRAVENOUS AS NEEDED
Status: DISCONTINUED | OUTPATIENT
Start: 2020-08-28 | End: 2020-08-28 | Stop reason: HOSPADM

## 2020-08-28 RX ORDER — ONDANSETRON 2 MG/ML
INJECTION INTRAMUSCULAR; INTRAVENOUS AS NEEDED
Status: DISCONTINUED | OUTPATIENT
Start: 2020-08-28 | End: 2020-08-28 | Stop reason: SURG

## 2020-08-28 RX ORDER — EPHEDRINE SULFATE 50 MG/ML
5 INJECTION, SOLUTION INTRAVENOUS ONCE AS NEEDED
Status: DISCONTINUED | OUTPATIENT
Start: 2020-08-28 | End: 2020-08-28 | Stop reason: HOSPADM

## 2020-08-28 RX ORDER — DOCUSATE SODIUM 100 MG/1
100 CAPSULE, LIQUID FILLED ORAL 2 TIMES DAILY
Status: DISCONTINUED | OUTPATIENT
Start: 2020-08-28 | End: 2020-08-29 | Stop reason: HOSPADM

## 2020-08-28 RX ORDER — CETIRIZINE HYDROCHLORIDE 10 MG/1
10 TABLET ORAL DAILY
Status: DISCONTINUED | OUTPATIENT
Start: 2020-08-28 | End: 2020-08-29 | Stop reason: HOSPADM

## 2020-08-28 RX ORDER — NALOXONE HCL 0.4 MG/ML
0.4 VIAL (ML) INJECTION
Status: DISCONTINUED | OUTPATIENT
Start: 2020-08-28 | End: 2020-08-29 | Stop reason: HOSPADM

## 2020-08-28 RX ORDER — SODIUM CHLORIDE 0.9 % (FLUSH) 0.9 %
3 SYRINGE (ML) INJECTION EVERY 12 HOURS SCHEDULED
Status: DISCONTINUED | OUTPATIENT
Start: 2020-08-28 | End: 2020-08-28 | Stop reason: HOSPADM

## 2020-08-28 RX ORDER — BISACODYL 5 MG/1
10 TABLET, DELAYED RELEASE ORAL DAILY PRN
Status: DISCONTINUED | OUTPATIENT
Start: 2020-08-28 | End: 2020-08-29 | Stop reason: HOSPADM

## 2020-08-28 RX ORDER — HYDROMORPHONE HCL 110MG/55ML
PATIENT CONTROLLED ANALGESIA SYRINGE INTRAVENOUS AS NEEDED
Status: DISCONTINUED | OUTPATIENT
Start: 2020-08-28 | End: 2020-08-28 | Stop reason: SURG

## 2020-08-28 RX ORDER — PSEUDOEPHEDRINE HCL 30 MG
100 TABLET ORAL 2 TIMES DAILY
Qty: 60 EACH | Refills: 0 | OUTPATIENT
Start: 2020-08-28 | End: 2021-12-14

## 2020-08-28 RX ORDER — ROSUVASTATIN CALCIUM 10 MG/1
10 TABLET, COATED ORAL DAILY
Status: DISCONTINUED | OUTPATIENT
Start: 2020-08-28 | End: 2020-08-29 | Stop reason: HOSPADM

## 2020-08-28 RX ORDER — OXYCODONE HYDROCHLORIDE AND ACETAMINOPHEN 5; 325 MG/1; MG/1
TABLET ORAL
Qty: 50 TABLET | Refills: 0
Start: 2020-08-28 | End: 2020-08-28 | Stop reason: HOSPADM

## 2020-08-28 RX ORDER — OXYCODONE HYDROCHLORIDE AND ACETAMINOPHEN 5; 325 MG/1; MG/1
2 TABLET ORAL EVERY 4 HOURS PRN
Status: DISCONTINUED | OUTPATIENT
Start: 2020-08-28 | End: 2020-08-28

## 2020-08-28 RX ORDER — HYDRALAZINE HYDROCHLORIDE 20 MG/ML
5 INJECTION INTRAMUSCULAR; INTRAVENOUS
Status: DISCONTINUED | OUTPATIENT
Start: 2020-08-28 | End: 2020-08-28 | Stop reason: HOSPADM

## 2020-08-28 RX ORDER — CEFAZOLIN SODIUM 2 G/100ML
2 INJECTION, SOLUTION INTRAVENOUS EVERY 8 HOURS
Status: COMPLETED | OUTPATIENT
Start: 2020-08-28 | End: 2020-08-29

## 2020-08-28 RX ORDER — NALOXONE HCL 0.4 MG/ML
0.2 VIAL (ML) INJECTION AS NEEDED
Status: DISCONTINUED | OUTPATIENT
Start: 2020-08-28 | End: 2020-08-28 | Stop reason: HOSPADM

## 2020-08-28 RX ORDER — PROMETHAZINE HYDROCHLORIDE 25 MG/1
25 TABLET ORAL ONCE AS NEEDED
Status: DISCONTINUED | OUTPATIENT
Start: 2020-08-28 | End: 2020-08-28 | Stop reason: HOSPADM

## 2020-08-28 RX ORDER — SODIUM CHLORIDE, SODIUM LACTATE, POTASSIUM CHLORIDE, CALCIUM CHLORIDE 600; 310; 30; 20 MG/100ML; MG/100ML; MG/100ML; MG/100ML
9 INJECTION, SOLUTION INTRAVENOUS CONTINUOUS
Status: DISCONTINUED | OUTPATIENT
Start: 2020-08-28 | End: 2020-08-28

## 2020-08-28 RX ORDER — ONDANSETRON 4 MG/1
4 TABLET, FILM COATED ORAL EVERY 6 HOURS PRN
Status: DISCONTINUED | OUTPATIENT
Start: 2020-08-28 | End: 2020-08-29 | Stop reason: HOSPADM

## 2020-08-28 RX ORDER — SODIUM CHLORIDE, SODIUM LACTATE, POTASSIUM CHLORIDE, CALCIUM CHLORIDE 600; 310; 30; 20 MG/100ML; MG/100ML; MG/100ML; MG/100ML
75 INJECTION, SOLUTION INTRAVENOUS CONTINUOUS
Status: DISCONTINUED | OUTPATIENT
Start: 2020-08-28 | End: 2020-08-29 | Stop reason: HOSPADM

## 2020-08-28 RX ORDER — MORPHINE SULFATE 2 MG/ML
4 INJECTION, SOLUTION INTRAMUSCULAR; INTRAVENOUS
Status: DISCONTINUED | OUTPATIENT
Start: 2020-08-28 | End: 2020-08-29 | Stop reason: HOSPADM

## 2020-08-28 RX ORDER — CEFAZOLIN SODIUM 2 G/100ML
2 INJECTION, SOLUTION INTRAVENOUS ONCE
Status: COMPLETED | OUTPATIENT
Start: 2020-08-28 | End: 2020-08-28

## 2020-08-28 RX ORDER — ROPIVACAINE HYDROCHLORIDE 5 MG/ML
INJECTION, SOLUTION EPIDURAL; INFILTRATION; PERINEURAL AS NEEDED
Status: DISCONTINUED | OUTPATIENT
Start: 2020-08-28 | End: 2020-08-28 | Stop reason: HOSPADM

## 2020-08-28 RX ORDER — ONDANSETRON 4 MG/1
4 TABLET, FILM COATED ORAL EVERY 6 HOURS PRN
Qty: 20 TABLET | Refills: 0 | OUTPATIENT
Start: 2020-08-28 | End: 2021-12-14

## 2020-08-28 RX ORDER — BISACODYL 10 MG
10 SUPPOSITORY, RECTAL RECTAL DAILY PRN
Status: DISCONTINUED | OUTPATIENT
Start: 2020-08-28 | End: 2020-08-29 | Stop reason: HOSPADM

## 2020-08-28 RX ORDER — PANTOPRAZOLE SODIUM 40 MG/1
40 TABLET, DELAYED RELEASE ORAL EVERY MORNING
Status: DISCONTINUED | OUTPATIENT
Start: 2020-08-29 | End: 2020-08-29 | Stop reason: HOSPADM

## 2020-08-28 RX ORDER — SODIUM CHLORIDE 0.9 % (FLUSH) 0.9 %
3-10 SYRINGE (ML) INJECTION AS NEEDED
Status: DISCONTINUED | OUTPATIENT
Start: 2020-08-28 | End: 2020-08-28 | Stop reason: HOSPADM

## 2020-08-28 RX ORDER — ONDANSETRON 2 MG/ML
4 INJECTION INTRAMUSCULAR; INTRAVENOUS EVERY 6 HOURS PRN
Status: DISCONTINUED | OUTPATIENT
Start: 2020-08-28 | End: 2020-08-29 | Stop reason: HOSPADM

## 2020-08-28 RX ORDER — LIDOCAINE HYDROCHLORIDE 20 MG/ML
INJECTION, SOLUTION INFILTRATION; PERINEURAL AS NEEDED
Status: DISCONTINUED | OUTPATIENT
Start: 2020-08-28 | End: 2020-08-28 | Stop reason: SURG

## 2020-08-28 RX ORDER — PREGABALIN 75 MG/1
150 CAPSULE ORAL ONCE
Status: COMPLETED | OUTPATIENT
Start: 2020-08-28 | End: 2020-08-28

## 2020-08-28 RX ORDER — ACETAMINOPHEN 325 MG/1
325 TABLET ORAL EVERY 4 HOURS PRN
Status: DISCONTINUED | OUTPATIENT
Start: 2020-08-28 | End: 2020-08-29 | Stop reason: HOSPADM

## 2020-08-28 RX ORDER — ROPIVACAINE HYDROCHLORIDE 5 MG/ML
INJECTION, SOLUTION EPIDURAL; INFILTRATION; PERINEURAL
Status: COMPLETED | OUTPATIENT
Start: 2020-08-28 | End: 2020-08-28

## 2020-08-28 RX ORDER — POLYETHYLENE GLYCOL 3350 17 G/17G
17 POWDER, FOR SOLUTION ORAL DAILY
Status: DISCONTINUED | OUTPATIENT
Start: 2020-08-28 | End: 2020-08-29 | Stop reason: HOSPADM

## 2020-08-28 RX ORDER — FENTANYL CITRATE 50 UG/ML
INJECTION, SOLUTION INTRAMUSCULAR; INTRAVENOUS AS NEEDED
Status: DISCONTINUED | OUTPATIENT
Start: 2020-08-28 | End: 2020-08-28 | Stop reason: SURG

## 2020-08-28 RX ORDER — ACETAMINOPHEN 500 MG
1000 TABLET ORAL ONCE
Status: COMPLETED | OUTPATIENT
Start: 2020-08-28 | End: 2020-08-28

## 2020-08-28 RX ORDER — LABETALOL HYDROCHLORIDE 5 MG/ML
5 INJECTION, SOLUTION INTRAVENOUS
Status: DISCONTINUED | OUTPATIENT
Start: 2020-08-28 | End: 2020-08-28 | Stop reason: HOSPADM

## 2020-08-28 RX ORDER — HYDROCODONE BITARTRATE AND ACETAMINOPHEN 5; 325 MG/1; MG/1
TABLET ORAL
Qty: 50 TABLET | Refills: 0
Start: 2020-08-28 | End: 2021-12-14

## 2020-08-28 RX ORDER — DIPHENHYDRAMINE HYDROCHLORIDE 50 MG/ML
12.5 INJECTION INTRAMUSCULAR; INTRAVENOUS
Status: DISCONTINUED | OUTPATIENT
Start: 2020-08-28 | End: 2020-08-28 | Stop reason: HOSPADM

## 2020-08-28 RX ORDER — HYDROMORPHONE HYDROCHLORIDE 1 MG/ML
0.5 INJECTION, SOLUTION INTRAMUSCULAR; INTRAVENOUS; SUBCUTANEOUS
Status: DISCONTINUED | OUTPATIENT
Start: 2020-08-28 | End: 2020-08-28 | Stop reason: HOSPADM

## 2020-08-28 RX ORDER — DIPHENHYDRAMINE HCL 25 MG
25 CAPSULE ORAL
Status: DISCONTINUED | OUTPATIENT
Start: 2020-08-28 | End: 2020-08-28 | Stop reason: HOSPADM

## 2020-08-28 RX ORDER — HYDROCODONE BITARTRATE AND ACETAMINOPHEN 5; 325 MG/1; MG/1
2 TABLET ORAL EVERY 4 HOURS PRN
Status: DISCONTINUED | OUTPATIENT
Start: 2020-08-28 | End: 2020-08-29 | Stop reason: HOSPADM

## 2020-08-28 RX ORDER — MIDAZOLAM HYDROCHLORIDE 1 MG/ML
1 INJECTION INTRAMUSCULAR; INTRAVENOUS
Status: DISCONTINUED | OUTPATIENT
Start: 2020-08-28 | End: 2020-08-28 | Stop reason: HOSPADM

## 2020-08-28 RX ORDER — LIDOCAINE HYDROCHLORIDE 10 MG/ML
0.5 INJECTION, SOLUTION EPIDURAL; INFILTRATION; INTRACAUDAL; PERINEURAL ONCE AS NEEDED
Status: DISCONTINUED | OUTPATIENT
Start: 2020-08-28 | End: 2020-08-28 | Stop reason: HOSPADM

## 2020-08-28 RX ORDER — GLYCOPYRROLATE 0.2 MG/ML
INJECTION INTRAMUSCULAR; INTRAVENOUS AS NEEDED
Status: DISCONTINUED | OUTPATIENT
Start: 2020-08-28 | End: 2020-08-28 | Stop reason: SURG

## 2020-08-28 RX ORDER — ONDANSETRON 4 MG/1
4 TABLET, FILM COATED ORAL EVERY 6 HOURS PRN
Qty: 40 TABLET | Refills: 0 | OUTPATIENT
Start: 2020-08-28 | End: 2020-08-29 | Stop reason: HOSPADM

## 2020-08-28 RX ORDER — MELOXICAM 15 MG/1
15 TABLET ORAL DAILY
Refills: 0
Start: 2020-08-28 | End: 2021-08-05

## 2020-08-28 RX ORDER — HYDROCODONE BITARTRATE AND ACETAMINOPHEN 5; 325 MG/1; MG/1
1 TABLET ORAL EVERY 4 HOURS PRN
Status: DISCONTINUED | OUTPATIENT
Start: 2020-08-28 | End: 2020-08-29 | Stop reason: HOSPADM

## 2020-08-28 RX ORDER — MAGNESIUM HYDROXIDE 1200 MG/15ML
LIQUID ORAL AS NEEDED
Status: DISCONTINUED | OUTPATIENT
Start: 2020-08-28 | End: 2020-08-28 | Stop reason: HOSPADM

## 2020-08-28 RX ORDER — ONDANSETRON 2 MG/ML
4 INJECTION INTRAMUSCULAR; INTRAVENOUS ONCE AS NEEDED
Status: DISCONTINUED | OUTPATIENT
Start: 2020-08-28 | End: 2020-08-28 | Stop reason: HOSPADM

## 2020-08-28 RX ORDER — L.ACID,PARA/B.BIFIDUM/S.THERM 8B CELL
1 CAPSULE ORAL DAILY
Status: DISCONTINUED | OUTPATIENT
Start: 2020-08-28 | End: 2020-08-29 | Stop reason: HOSPADM

## 2020-08-28 RX ORDER — KETOROLAC TROMETHAMINE 30 MG/ML
30 INJECTION, SOLUTION INTRAMUSCULAR; INTRAVENOUS EVERY 6 HOURS PRN
Status: COMPLETED | OUTPATIENT
Start: 2020-08-28 | End: 2020-08-28

## 2020-08-28 RX ORDER — MELOXICAM 15 MG/1
15 TABLET ORAL ONCE
Status: COMPLETED | OUTPATIENT
Start: 2020-08-28 | End: 2020-08-28

## 2020-08-28 RX ORDER — ROCURONIUM BROMIDE 10 MG/ML
INJECTION, SOLUTION INTRAVENOUS AS NEEDED
Status: DISCONTINUED | OUTPATIENT
Start: 2020-08-28 | End: 2020-08-28 | Stop reason: SURG

## 2020-08-28 RX ORDER — PROPOFOL 10 MG/ML
VIAL (ML) INTRAVENOUS AS NEEDED
Status: DISCONTINUED | OUTPATIENT
Start: 2020-08-28 | End: 2020-08-28 | Stop reason: SURG

## 2020-08-28 RX ADMIN — NEOSTIGMINE METHYLSULFATE 2 MG: 1 INJECTION INTRAMUSCULAR; INTRAVENOUS; SUBCUTANEOUS at 08:33

## 2020-08-28 RX ADMIN — ONDANSETRON 4 MG: 2 INJECTION INTRAMUSCULAR; INTRAVENOUS at 13:53

## 2020-08-28 RX ADMIN — DOCUSATE SODIUM 100 MG: 100 CAPSULE, LIQUID FILLED ORAL at 14:18

## 2020-08-28 RX ADMIN — MUPIROCIN 1 APPLICATION: 20 OINTMENT TOPICAL at 21:12

## 2020-08-28 RX ADMIN — PHENYLEPHRINE HYDROCHLORIDE 100 MCG: 10 INJECTION INTRAVENOUS at 08:51

## 2020-08-28 RX ADMIN — PHENYLEPHRINE HYDROCHLORIDE 100 MCG: 10 INJECTION INTRAVENOUS at 07:31

## 2020-08-28 RX ADMIN — ROPIVACAINE HYDROCHLORIDE 30 ML: 5 INJECTION, SOLUTION EPIDURAL; INFILTRATION; PERINEURAL at 06:51

## 2020-08-28 RX ADMIN — DOCUSATE SODIUM 100 MG: 100 CAPSULE, LIQUID FILLED ORAL at 21:12

## 2020-08-28 RX ADMIN — ACETAMINOPHEN 1000 MG: 500 TABLET ORAL at 05:41

## 2020-08-28 RX ADMIN — CEFAZOLIN SODIUM 2 G: 2 INJECTION, SOLUTION INTRAVENOUS at 14:18

## 2020-08-28 RX ADMIN — Medication 1 CAPSULE: at 14:18

## 2020-08-28 RX ADMIN — GLYCOPYRROLATE 0.4 MG: 0.2 INJECTION INTRAMUSCULAR; INTRAVENOUS at 08:33

## 2020-08-28 RX ADMIN — SODIUM CHLORIDE, POTASSIUM CHLORIDE, SODIUM LACTATE AND CALCIUM CHLORIDE 9 ML/HR: 600; 310; 30; 20 INJECTION, SOLUTION INTRAVENOUS at 06:44

## 2020-08-28 RX ADMIN — POLYETHYLENE GLYCOL 3350 17 G: 17 POWDER, FOR SOLUTION ORAL at 14:18

## 2020-08-28 RX ADMIN — PHENYLEPHRINE HYDROCHLORIDE 200 MCG: 10 INJECTION INTRAVENOUS at 07:18

## 2020-08-28 RX ADMIN — SODIUM CHLORIDE, POTASSIUM CHLORIDE, SODIUM LACTATE AND CALCIUM CHLORIDE 75 ML/HR: 600; 310; 30; 20 INJECTION, SOLUTION INTRAVENOUS at 13:54

## 2020-08-28 RX ADMIN — HYDROMORPHONE HYDROCHLORIDE 0.5 MG: 2 INJECTION, SOLUTION INTRAMUSCULAR; INTRAVENOUS; SUBCUTANEOUS at 07:48

## 2020-08-28 RX ADMIN — MELOXICAM 15 MG: 15 TABLET ORAL at 05:41

## 2020-08-28 RX ADMIN — PREGABALIN 150 MG: 75 CAPSULE ORAL at 05:41

## 2020-08-28 RX ADMIN — FENTANYL CITRATE 100 MCG: 50 INJECTION INTRAMUSCULAR; INTRAVENOUS at 07:10

## 2020-08-28 RX ADMIN — ROSUVASTATIN CALCIUM 10 MG: 10 TABLET, FILM COATED ORAL at 14:18

## 2020-08-28 RX ADMIN — ROCURONIUM BROMIDE 35 MG: 10 INJECTION INTRAVENOUS at 07:12

## 2020-08-28 RX ADMIN — KETOROLAC TROMETHAMINE 30 MG: 30 INJECTION, SOLUTION INTRAMUSCULAR at 23:27

## 2020-08-28 RX ADMIN — LIDOCAINE HYDROCHLORIDE 100 MG: 20 INJECTION, SOLUTION INFILTRATION; PERINEURAL at 07:10

## 2020-08-28 RX ADMIN — CEFAZOLIN SODIUM 2 G: 2 INJECTION, SOLUTION INTRAVENOUS at 23:27

## 2020-08-28 RX ADMIN — HYDROMORPHONE HYDROCHLORIDE 0.5 MG: 2 INJECTION, SOLUTION INTRAMUSCULAR; INTRAVENOUS; SUBCUTANEOUS at 07:57

## 2020-08-28 RX ADMIN — KETOROLAC TROMETHAMINE 30 MG: 30 INJECTION, SOLUTION INTRAMUSCULAR at 17:09

## 2020-08-28 RX ADMIN — FENTANYL CITRATE 50 MCG: 50 INJECTION, SOLUTION INTRAMUSCULAR; INTRAVENOUS at 06:43

## 2020-08-28 RX ADMIN — METOPROLOL TARTRATE 25 MG: 25 TABLET, FILM COATED ORAL at 21:12

## 2020-08-28 RX ADMIN — CEFAZOLIN SODIUM 2 G: 2 INJECTION, SOLUTION INTRAVENOUS at 07:17

## 2020-08-28 RX ADMIN — TRANEXAMIC ACID 1000 MG: 1 INJECTION, SOLUTION INTRAVENOUS at 08:23

## 2020-08-28 RX ADMIN — SODIUM CHLORIDE, POTASSIUM CHLORIDE, SODIUM LACTATE AND CALCIUM CHLORIDE: 600; 310; 30; 20 INJECTION, SOLUTION INTRAVENOUS at 08:24

## 2020-08-28 RX ADMIN — DEXAMETHASONE SODIUM PHOSPHATE 8 MG: 10 INJECTION INTRAMUSCULAR; INTRAVENOUS at 07:29

## 2020-08-28 RX ADMIN — CETIRIZINE HYDROCHLORIDE 10 MG: 10 TABLET ORAL at 14:18

## 2020-08-28 RX ADMIN — FENTANYL CITRATE 50 MCG: 50 INJECTION, SOLUTION INTRAMUSCULAR; INTRAVENOUS at 10:16

## 2020-08-28 RX ADMIN — ONDANSETRON 4 MG: 2 INJECTION INTRAMUSCULAR; INTRAVENOUS at 23:27

## 2020-08-28 RX ADMIN — PROPOFOL 150 MG: 10 INJECTION, EMULSION INTRAVENOUS at 07:12

## 2020-08-28 RX ADMIN — MIDAZOLAM 1 MG: 1 INJECTION INTRAMUSCULAR; INTRAVENOUS at 06:43

## 2020-08-28 RX ADMIN — ONDANSETRON HYDROCHLORIDE 4 MG: 2 SOLUTION INTRAMUSCULAR; INTRAVENOUS at 08:19

## 2020-08-28 NOTE — ANESTHESIA POSTPROCEDURE EVALUATION
"Patient: Prema Hoang    Procedure Summary     Date:  08/28/20 Room / Location:  [facility] OR [address] [place] [address] MAIN OR    Anesthesia Start:  0709 Anesthesia Stop:  0905    Procedure:  TOTAL KNEE ARTHROPLASTY (Right Knee) Diagnosis:       Primary osteoarthritis of right knee      (Primary osteoarthritis of right knee [M17.11])    Surgeon:  Dean Trinidad MD Provider:  Jhon Hyde MD    Anesthesia Type:  general with block ASA Status:  2          Anesthesia Type: general with block    Vitals  Vitals Value Taken Time   /88 8/28/2020 10:25 AM   Temp 36.6 °C (97.8 °F) 8/28/2020  9:03 AM   Pulse 86 8/28/2020 10:32 AM   Resp 16 8/28/2020  9:20 AM   SpO2 95 % 8/28/2020 10:32 AM   Vitals shown include unvalidated device data.        Post Anesthesia Care and Evaluation    Patient location during evaluation: bedside  Patient participation: complete - patient participated  Level of consciousness: responsive to verbal stimuli  Pain management: adequate  Airway patency: patent  Anesthetic complications: No anesthetic complications    Cardiovascular status: acceptable  Respiratory status: acceptable  Hydration status: acceptable    Comments: /98   Pulse 86   Temp 36.6 °C (97.8 °F) (Oral)   Resp 16   Ht 165.1 cm (65\")   Wt 69.8 kg (153 lb 14.1 oz)   SpO2 96%   BMI 25.61 kg/m²         "

## 2020-08-28 NOTE — ANESTHESIA PROCEDURE NOTES
Peripheral Block      Patient reassessed immediately prior to procedure    Patient location during procedure: holding area  Start time: 8/28/2020 6:42 AM  Stop time: 8/28/2020 6:51 AM  Reason for block: procedure for pain, at surgeon's request and post-op pain management  Performed by  Anesthesiologist: Jhon Hyde MD  Preanesthetic Checklist  Completed: patient identified, site marked, surgical consent, pre-op evaluation, timeout performed, IV checked, risks and benefits discussed and monitors and equipment checked  Prep:  Pt Position: supine  Sterile barriers:cap, gloves, sterile barriers and mask  Prep: ChloraPrep  Patient monitoring: blood pressure monitoring, continuous pulse oximetry and EKG  Procedure  Sedation:yes  Performed under: local infiltration  Guidance:ultrasound guided  ULTRASOUND INTERPRETATION. Using ultrasound guidance a 21 G gauge needle was placed in close proximity to the nerve, at which point, under ultrasound guidance anesthetic was injected in the area of the nerve and spread of the anesthesia was seen on ultrasound in close proximity thereto.  There were no abnormalities seen on ultrasound; a digital image was taken; and the patient tolerated the procedure with no complications. Images:still images obtained, printed/placed on chart    Laterality:right  Block Type:adductor canal block  Injection Technique:single-shot  Needle Type:echogenic  Needle Gauge:21 G  Resistance on Injection: none    Medications Used: ropivacaine (NAROPIN) 0.5 % injection, 30 mL  Med admintered at 8/28/2020 6:51 AM      Post Assessment  Injection Assessment: negative aspiration for heme, no paresthesia on injection and incremental injection  Patient Tolerance:comfortable throughout block  Complications:no

## 2020-08-28 NOTE — ANESTHESIA PROCEDURE NOTES
Airway  Urgency: elective    Date/Time: 8/28/2020 7:16 AM  Airway not difficult    General Information and Staff    Patient location during procedure: OR  Anesthesiologist: Jhon Hyde MD  CRNA: Liang Jj CRNA    Indications and Patient Condition  Indications for airway management: airway protection    Preoxygenated: yes  Mask difficulty assessment: 1 - vent by mask    Final Airway Details  Final airway type: endotracheal airway      Successful airway: ETT  Cuffed: yes   Successful intubation technique: direct laryngoscopy  Endotracheal tube insertion site: oral  Blade: De [place] Torre  Blade size: 2  ETT size (mm): 7.0  Cormack-Lehane Classification: grade I - full view of glottis  Placement verified by: chest auscultation and capnometry   Measured from: lips  ETT/EBT  to lips (cm): 22  Number of attempts at approach: 1  Assessment: lips, teeth, and gum same as pre-op and atraumatic intubation    Additional Comments  Pre 02 100%, SIVI, DL x1, atraumatic intubation, BLBS, Positive ETC02.

## 2020-08-28 NOTE — ANESTHESIA PREPROCEDURE EVALUATION
Anesthesia Evaluation     Patient summary reviewed and Nursing notes reviewed   history of anesthetic complications: PONV  NPO Solid Status: > 8 hours  NPO Liquid Status: > 8 hours           Airway   Mallampati: II  TM distance: >3 FB  Neck ROM: full  No difficulty expected  Dental          Pulmonary    (+) a smoker Former,   (-) asthma, sleep apnea, rhonchi, decreased breath sounds, wheezes  Cardiovascular   Exercise tolerance: good (4-7 METS)    Rhythm: regular  Rate: normal    (+) hyperlipidemia,   (-) hypertension, CAD, angina, SOMMER, murmur      Neuro/Psych  (+) headaches,     (-) CVA  GI/Hepatic/Renal/Endo    (+)  GERD,  liver disease,   (-) no renal disease, diabetes, no thyroid disorder    Musculoskeletal     Abdominal     Abdomen: soft.   Substance History      OB/GYN          Other   arthritis,    history of cancer remission                    Anesthesia Plan    ASA 2     general with block   (Right adductor canal )  intravenous induction     Anesthetic plan, all risks, benefits, and alternatives have been provided, discussed and informed consent has been obtained with: patient.

## 2020-08-29 VITALS
DIASTOLIC BLOOD PRESSURE: 57 MMHG | HEART RATE: 81 BPM | TEMPERATURE: 98.9 F | RESPIRATION RATE: 18 BRPM | SYSTOLIC BLOOD PRESSURE: 114 MMHG | OXYGEN SATURATION: 98 % | WEIGHT: 154.32 LBS | BODY MASS INDEX: 25.71 KG/M2 | HEIGHT: 65 IN

## 2020-08-29 PROBLEM — R94.31 ABNORMAL EKG: Status: ACTIVE | Noted: 2020-08-29

## 2020-08-29 LAB
ALBUMIN SERPL-MCNC: 3.5 G/DL (ref 3.5–5.2)
ALBUMIN/GLOB SERPL: 1.5 G/DL
ALP SERPL-CCNC: 58 U/L (ref 39–117)
ALT SERPL W P-5'-P-CCNC: 28 U/L (ref 1–33)
ANION GAP SERPL CALCULATED.3IONS-SCNC: 10.6 MMOL/L (ref 5–15)
AST SERPL-CCNC: 28 U/L (ref 1–32)
BASOPHILS # BLD AUTO: 0.01 10*3/MM3 (ref 0–0.2)
BASOPHILS NFR BLD AUTO: 0.1 % (ref 0–1.5)
BILIRUB SERPL-MCNC: 0.3 MG/DL (ref 0–1.2)
BUN SERPL-MCNC: 20 MG/DL (ref 8–23)
BUN/CREAT SERPL: 26.7 (ref 7–25)
CALCIUM SPEC-SCNC: 9.4 MG/DL (ref 8.6–10.5)
CHLORIDE SERPL-SCNC: 104 MMOL/L (ref 98–107)
CO2 SERPL-SCNC: 22.4 MMOL/L (ref 22–29)
CREAT SERPL-MCNC: 0.75 MG/DL (ref 0.57–1)
DEPRECATED RDW RBC AUTO: 42.1 FL (ref 37–54)
EOSINOPHIL # BLD AUTO: 0 10*3/MM3 (ref 0–0.4)
EOSINOPHIL NFR BLD AUTO: 0 % (ref 0.3–6.2)
ERYTHROCYTE [DISTWIDTH] IN BLOOD BY AUTOMATED COUNT: 12.8 % (ref 12.3–15.4)
GFR SERPL CREATININE-BSD FRML MDRD: 76 ML/MIN/1.73
GLOBULIN UR ELPH-MCNC: 2.3 GM/DL
GLUCOSE SERPL-MCNC: 122 MG/DL (ref 65–99)
HCT VFR BLD AUTO: 34.9 % (ref 34–46.6)
HGB BLD-MCNC: 12.1 G/DL (ref 12–15.9)
IMM GRANULOCYTES # BLD AUTO: 0.03 10*3/MM3 (ref 0–0.05)
IMM GRANULOCYTES NFR BLD AUTO: 0.4 % (ref 0–0.5)
LYMPHOCYTES # BLD AUTO: 1.27 10*3/MM3 (ref 0.7–3.1)
LYMPHOCYTES NFR BLD AUTO: 15 % (ref 19.6–45.3)
MCH RBC QN AUTO: 31.1 PG (ref 26.6–33)
MCHC RBC AUTO-ENTMCNC: 34.7 G/DL (ref 31.5–35.7)
MCV RBC AUTO: 89.7 FL (ref 79–97)
MONOCYTES # BLD AUTO: 0.91 10*3/MM3 (ref 0.1–0.9)
MONOCYTES NFR BLD AUTO: 10.7 % (ref 5–12)
NEUTROPHILS NFR BLD AUTO: 6.25 10*3/MM3 (ref 1.7–7)
NEUTROPHILS NFR BLD AUTO: 73.8 % (ref 42.7–76)
NRBC BLD AUTO-RTO: 0 /100 WBC (ref 0–0.2)
PLATELET # BLD AUTO: 204 10*3/MM3 (ref 140–450)
PMV BLD AUTO: 11.2 FL (ref 6–12)
POTASSIUM SERPL-SCNC: 4.4 MMOL/L (ref 3.5–5.2)
PROT SERPL-MCNC: 5.8 G/DL (ref 6–8.5)
RBC # BLD AUTO: 3.89 10*6/MM3 (ref 3.77–5.28)
SODIUM SERPL-SCNC: 137 MMOL/L (ref 136–145)
TROPONIN T SERPL-MCNC: <0.01 NG/ML (ref 0–0.03)
WBC # BLD AUTO: 8.47 10*3/MM3 (ref 3.4–10.8)

## 2020-08-29 PROCEDURE — G0378 HOSPITAL OBSERVATION PER HR: HCPCS

## 2020-08-29 PROCEDURE — 63710000001 METOPROLOL TARTRATE 25 MG TABLET: Performed by: ORTHOPAEDIC SURGERY

## 2020-08-29 PROCEDURE — A9270 NON-COVERED ITEM OR SERVICE: HCPCS | Performed by: ORTHOPAEDIC SURGERY

## 2020-08-29 PROCEDURE — 99204 OFFICE O/P NEW MOD 45 MIN: CPT | Performed by: INTERNAL MEDICINE

## 2020-08-29 PROCEDURE — 80053 COMPREHEN METABOLIC PANEL: CPT | Performed by: HOSPITALIST

## 2020-08-29 PROCEDURE — 63710000001 MUPIROCIN 2 % OINTMENT: Performed by: ORTHOPAEDIC SURGERY

## 2020-08-29 PROCEDURE — 63710000001 CETIRIZINE 10 MG TABLET: Performed by: ORTHOPAEDIC SURGERY

## 2020-08-29 PROCEDURE — 63710000001 POLYETHYLENE GLYCOL 17 G PACK: Performed by: ORTHOPAEDIC SURGERY

## 2020-08-29 PROCEDURE — 97110 THERAPEUTIC EXERCISES: CPT

## 2020-08-29 PROCEDURE — 63710000001 HYDROCODONE-ACETAMINOPHEN 5-325 MG TABLET: Performed by: ORTHOPAEDIC SURGERY

## 2020-08-29 PROCEDURE — 85025 COMPLETE CBC W/AUTO DIFF WBC: CPT | Performed by: HOSPITALIST

## 2020-08-29 PROCEDURE — 63710000001 DOCUSATE SODIUM 100 MG CAPSULE: Performed by: ORTHOPAEDIC SURGERY

## 2020-08-29 PROCEDURE — 63710000001 LACTOBACILLUS ACIDOPHILUS CAPSULE: Performed by: ORTHOPAEDIC SURGERY

## 2020-08-29 PROCEDURE — 63710000001 PANTOPRAZOLE 40 MG TABLET DELAYED-RELEASE: Performed by: ORTHOPAEDIC SURGERY

## 2020-08-29 PROCEDURE — 63710000001 ROSUVASTATIN 10 MG TABLET: Performed by: ORTHOPAEDIC SURGERY

## 2020-08-29 PROCEDURE — 99024 POSTOP FOLLOW-UP VISIT: CPT | Performed by: ORTHOPAEDIC SURGERY

## 2020-08-29 RX ADMIN — DOCUSATE SODIUM 100 MG: 100 CAPSULE, LIQUID FILLED ORAL at 08:05

## 2020-08-29 RX ADMIN — HYDROCODONE BITARTRATE AND ACETAMINOPHEN 2 TABLET: 5; 325 TABLET ORAL at 14:21

## 2020-08-29 RX ADMIN — METOPROLOL TARTRATE 25 MG: 25 TABLET, FILM COATED ORAL at 08:04

## 2020-08-29 RX ADMIN — ROSUVASTATIN CALCIUM 10 MG: 10 TABLET, FILM COATED ORAL at 08:05

## 2020-08-29 RX ADMIN — HYDROCODONE BITARTRATE AND ACETAMINOPHEN 2 TABLET: 5; 325 TABLET ORAL at 10:19

## 2020-08-29 RX ADMIN — PANTOPRAZOLE SODIUM 40 MG: 40 TABLET, DELAYED RELEASE ORAL at 06:14

## 2020-08-29 RX ADMIN — Medication 1 CAPSULE: at 08:05

## 2020-08-29 RX ADMIN — HYDROCODONE BITARTRATE AND ACETAMINOPHEN 1 TABLET: 5; 325 TABLET ORAL at 06:14

## 2020-08-29 RX ADMIN — CETIRIZINE HYDROCHLORIDE 10 MG: 10 TABLET ORAL at 08:04

## 2020-08-29 RX ADMIN — POLYETHYLENE GLYCOL 3350 17 G: 17 POWDER, FOR SOLUTION ORAL at 08:05

## 2020-08-29 RX ADMIN — MUPIROCIN 1 APPLICATION: 20 OINTMENT TOPICAL at 08:05

## 2020-08-31 ENCOUNTER — TELEPHONE (OUTPATIENT)
Dept: ORTHOPEDIC SURGERY | Facility: CLINIC | Age: 70
End: 2020-08-31

## 2020-09-01 NOTE — TELEPHONE ENCOUNTER
I called the patient this morning at about 1050.  She states that she is doing really well with her knee replacement surgery.  She has just completed her physical therapy.  She states that her headache and nausea have completely settled down.  The patient is very satisfied with her postoperative outcome.

## 2020-09-01 NOTE — DISCHARGE SUMMARY
Orthopedic Discharge Summary      Patient: Prema Hoang      YOB: 1950    Medical Record Number: [id number]    Attending Physician: Dean Trinidad MD, orthopedic surgeon.  Consulting Physician(s): TREVON for hospitalist and internal medicine service and cardiology service.  Date of Admission: 8/28/2020  4:59 AM  Date of Discharge: 8/29/2020      Patient Active Problem List   Diagnosis   • Right upper quadrant pain   • Abnormal liver enzymes   • Abnormal magnetic resonance imaging study   • Arthritis   • Hereditary essential tremor   • Gastroesophageal reflux disease without esophagitis   • Headache   • Hyperlipidemia   • Impaired fasting glucose   • Osteoarthritis   • Osteoporosis   • Nonalcoholic steatohepatitis (BYRD)   • Chronic pain of both shoulders   • Bilateral chronic knee pain   • Rotator cuff tear arthropathy of both shoulders   • Chronic back pain   • Bilateral shoulder pain   • Bilateral primary osteoarthritis of knee   • Nontraumatic subluxation of extensor tendon at metacarpophalangeal joint of right hand   • Polyarthralgia   • AC (acromioclavicular) arthritis   • Acquired absence of genital organ   • Asymptomatic postmenopausal status   • Disorder of bone and cartilage   • H/O bilateral mastectomy   • Hx antineoplastic chemotherapy   • Hx of fusion of cervical spine   • Other postprocedural status(V45.89)   • Personal history of malignant neoplasm of breast   • Prophylactic use of aromatase inhibitors   • Post-operative state   • Sebaceous cyst   • Shoulder impingement   • Trigger ring finger of right hand   • Rotator cuff arthropathy of both shoulders   • Chronic pain of both knees   • Primary osteoarthritis of right knee   • Vomiting   • Abnormal EKG     TOTAL KNEE ARTHROPLASTY       Allergies   Allergen Reactions   • Propoxyphene GI Intolerance       Current Medications:     Discharge Medications      New Medications      Instructions Start Date   docusate sodium 100 MG capsule   100  mg, Oral, 2 Times Daily      HYDROcodone-acetaminophen 5-325 MG per tablet  Commonly known as:  NORCO   Take 1-2 tabs po q 4 hours prn pain      ondansetron 4 MG tablet  Commonly known as:  ZOFRAN   4 mg, Oral, Every 6 Hours PRN      rivaroxaban 10 MG tablet  Commonly known as:  XARELTO   10 mg, Oral, Daily With Dinner         Changes to Medications      Instructions Start Date   meloxicam 15 MG tablet  Commonly known as:  MOBIC  What changed:  additional instructions   15 mg, Oral, Daily, Resume after Completion of Xarelto RX         Continue These Medications      Instructions Start Date   ALIGN PO   1 tablet, Oral, Daily      B-Complex/B-12 tablet   1 tablet, Oral, Daily      Calcium 600+D3 600-200 MG-UNIT tablet  Generic drug:  Calcium Carb-Cholecalciferol   1 tablet, Oral, Daily      CENTRUM SILVER 50+WOMEN PO   1 tablet, Oral, Daily      cetirizine 10 MG tablet  Commonly known as:  zyrTEC   10 mg, Oral, Daily      famciclovir 500 MG tablet  Commonly known as:  FAMVIR   TAKE 3 TABLETS BY MOUTH ONE TIME AS NEEDED FOR HSV OUTBREAK      metoprolol tartrate 25 MG tablet  Commonly known as:  LOPRESSOR   25 mg, Oral, 2 Times Daily      nexIUM 40 MG capsule  Generic drug:  esomeprazole   40 mg, Oral, Every Morning Before Breakfast      rosuvastatin 10 MG tablet  Commonly known as:  CRESTOR   10 mg, Oral, Daily      Turmeric 500 MG capsule   1 tablet, Oral, Daily      Vitamin D3 50 MCG (2000 UT) capsule   2,000 Units, Oral, Daily         Stop These Medications    Bactroban Nasal 2 % nasal ointment  Generic drug:  mupirocin     Chlorhexidine Gluconate Cloth 2 % pads                 Past Medical History:   Diagnosis Date   • Breast cancer (CMS/HCC) 2002, 2007    Lumpectomy with radiation and chemotherapy on the left followed by bilateral mastectomy 2007   • Environmental allergies    • Fatty liver    • Fluttering heart    • Gallstones    • GERD (gastroesophageal reflux disease)    • History of concussion 02/09/2020   •  History of kidney stones    • Hyperlipidemia    • Multiple bruises     ARMS, LEGS    • Multiple skin tears     ARMS AND LEGS   • Osteoarthritis    • Osteoporosis    • PONV (postoperative nausea and vomiting)    • Right knee pain    • Thin skin    • Wound of right leg     INSTRUCTED TO LET SURGEON KNOW IF THIS IS NOT HEALED WITHIN A COUPLE DAYS OF PAT        Past Surgical History:   Procedure Laterality Date   • BACK SURGERY     • BREAST LUMPECTOMY Left    • COMBINED HYSTERECTOMY VAGINAL / OOPHORECTOMY / A&P REPAIR     • MASTECTOMY Bilateral    • NECK SURGERY     • TOTAL KNEE ARTHROPLASTY Right 2020    Procedure: TOTAL KNEE ARTHROPLASTY;  Surgeon: Dean Trinidad MD;  Location: [facility] OR;  Service: Orthopedics;  Laterality: Right;        Social History     Occupational History   • Not on file   Tobacco Use   • Smoking status: Former Smoker     Types: Cigarettes     Last attempt to quit:      Years since quittin.6   • Smokeless tobacco: Never Used   • Tobacco comment: SOCIAL, MAYBE 1 PACK PER WEEK   Substance and Sexual Activity   • Alcohol use: Yes     Alcohol/week: 7.0 standard drinks     Types: 7 Glasses of wine per week     Comment: 7 days per week   • Drug use: No   • Sexual activity: Defer      Social History     Social History Narrative   • Not on file        Family History   Problem Relation Age of Onset   • No Known Problems Mother    • Hypertension Father    • Aneurysm Father    • Heart disease Father    • Other Father         mesothelioma   • Cancer Brother         colon, liver   • Malig Hyperthermia Neg Hx              Hospital Course:  70 y.o. female admitted to [facility] to services of No att. providers found with Primary osteoarthritis of right knee [M17.11]  Primary osteoarthritis of right knee [M17.11] on 2020 and underwent TOTAL KNEE ARTHROPLASTY Per No att. providers found. Antibiotic and VTE prophylaxis were per SCIP protocols. Post-operatively  the patient transferred to the post-operative floor where the patient underwent mobilization therapy that included active as well as passive ROM exercises. Opioids were titrated to achieve appropriate pain management to allow for participation in mobilization exercises. Vital signs are now stable. The incision is intact without signs or symptoms of infection. Operative extremity neurovascular status remains intact.   Appropriate education re: incision care, activity levels, medications, and follow up visits was completed and all questions were answered. The patient is now deemed stable for discharge from hospital.      DIAGNOSTIC TESTS:   Admission on 08/28/2020, Discharged on 08/29/2020   Component Date Value Ref Range Status   • Troponin T 08/28/2020 <0.010  0.000 - 0.030 ng/mL Final   • Glucose 08/28/2020 141* 65 - 99 mg/dL Final   • BUN 08/28/2020 22  8 - 23 mg/dL Final   • Creatinine 08/28/2020 0.71  0.57 - 1.00 mg/dL Final   • Sodium 08/28/2020 138  136 - 145 mmol/L Final   • Potassium 08/28/2020 4.4  3.5 - 5.2 mmol/L Final   • Chloride 08/28/2020 106  98 - 107 mmol/L Final   • CO2 08/28/2020 21.0* 22.0 - 29.0 mmol/L Final   • Calcium 08/28/2020 9.0  8.6 - 10.5 mg/dL Final   • Total Protein 08/28/2020 5.6* 6.0 - 8.5 g/dL Final   • Albumin 08/28/2020 3.30* 3.50 - 5.20 g/dL Final   • ALT (SGPT) 08/28/2020 30  1 - 33 U/L Final   • AST (SGOT) 08/28/2020 32  1 - 32 U/L Final   • Alkaline Phosphatase 08/28/2020 58  39 - 117 U/L Final   • Total Bilirubin 08/28/2020 0.2  0.0 - 1.2 mg/dL Final   • eGFR Non African Amer 08/28/2020 81  >60 mL/min/1.73 Final   • Globulin 08/28/2020 2.3  gm/dL Final   • A/G Ratio 08/28/2020 1.4  g/dL Final   • BUN/Creatinine Ratio 08/28/2020 31.0* 7.0 - 25.0 Final   • Anion Gap 08/28/2020 11.0  5.0 - 15.0 mmol/L Final   • Troponin T 08/28/2020 <0.010  0.000 - 0.030 ng/mL Final   • Glucose 08/29/2020 122* 65 - 99 mg/dL Final   • BUN 08/29/2020 20  8 - 23 mg/dL Final   • Creatinine  08/29/2020 0.75  0.57 - 1.00 mg/dL Final   • Sodium 08/29/2020 137  136 - 145 mmol/L Final   • Potassium 08/29/2020 4.4  3.5 - 5.2 mmol/L Final   • Chloride 08/29/2020 104  98 - 107 mmol/L Final   • CO2 08/29/2020 22.4  22.0 - 29.0 mmol/L Final   • Calcium 08/29/2020 9.4  8.6 - 10.5 mg/dL Final   • Total Protein 08/29/2020 5.8* 6.0 - 8.5 g/dL Final   • Albumin 08/29/2020 3.50  3.50 - 5.20 g/dL Final   • ALT (SGPT) 08/29/2020 28  1 - 33 U/L Final   • AST (SGOT) 08/29/2020 28  1 - 32 U/L Final   • Alkaline Phosphatase 08/29/2020 58  39 - 117 U/L Final   • Total Bilirubin 08/29/2020 0.3  0.0 - 1.2 mg/dL Final   • eGFR Non African Amer 08/29/2020 76  >60 mL/min/1.73 Final   • Globulin 08/29/2020 2.3  gm/dL Final   • A/G Ratio 08/29/2020 1.5  g/dL Final   • BUN/Creatinine Ratio 08/29/2020 26.7* 7.0 - 25.0 Final   • Anion Gap 08/29/2020 10.6  5.0 - 15.0 mmol/L Final   • WBC 08/29/2020 8.47  3.40 - 10.80 10*3/mm3 Final   • RBC 08/29/2020 3.89  3.77 - 5.28 10*6/mm3 Final   • Hemoglobin 08/29/2020 12.1  12.0 - 15.9 g/dL Final   • Hematocrit 08/29/2020 34.9  34.0 - 46.6 % Final   • MCV 08/29/2020 89.7  79.0 - 97.0 fL Final   • MCH 08/29/2020 31.1  26.6 - 33.0 pg Final   • MCHC 08/29/2020 34.7  31.5 - 35.7 g/dL Final   • RDW 08/29/2020 12.8  12.3 - 15.4 % Final   • RDW-SD 08/29/2020 42.1  37.0 - 54.0 fl Final   • MPV 08/29/2020 11.2  6.0 - 12.0 fL Final   • Platelets 08/29/2020 204  140 - 450 10*3/mm3 Final   • Neutrophil % 08/29/2020 73.8  42.7 - 76.0 % Final   • Lymphocyte % 08/29/2020 15.0* 19.6 - 45.3 % Final   • Monocyte % 08/29/2020 10.7  5.0 - 12.0 % Final   • Eosinophil % 08/29/2020 0.0* 0.3 - 6.2 % Final   • Basophil % 08/29/2020 0.1  0.0 - 1.5 % Final   • Immature Grans % 08/29/2020 0.4  0.0 - 0.5 % Final   • Neutrophils, Absolute 08/29/2020 6.25  1.70 - 7.00 10*3/mm3 Final   • Lymphocytes, Absolute 08/29/2020 1.27  0.70 - 3.10 10*3/mm3 Final   • Monocytes, Absolute 08/29/2020 0.91* 0.10 - 0.90 10*3/mm3 Final   •  Eosinophils, Absolute 08/29/2020 0.00  0.00 - 0.40 10*3/mm3 Final   • Basophils, Absolute 08/29/2020 0.01  0.00 - 0.20 10*3/mm3 Final   • Immature Grans, Absolute 08/29/2020 0.03  0.00 - 0.05 10*3/mm3 Final   • nRBC 08/29/2020 0.0  0.0 - 0.2 /100 WBC Final     No results found.    Discharge and Follow up Instructions:   Discharge Instructions:       1. Patient is to continue with physical therapy exercises BID and continue working with        the physical therapist as ordered.  2.  Continue to follow precautions as instructed.   3.  Patient may weight bear as tolerated.   4.  Continue RENARD hose daily and ice regularly. Patient instructed on frequent calf                  pumping exercises.  May remove compression stockings at night.  5.  Patient also instructed on incentive spirometer during hospitalization and                          encouraged to continue to use at home regularly.   6.  Patient is instructed to continue DVT prophylaxis.  7.  The dressing should be left in place. If waterproof dressing is intact the patient may         shower immediately following discharge. If the dressing becomes disloged or                   saturated it should be changed and patient must wait until POD #5 to shower. If               dressing is changed, apply dry sterile dressing after showering.  8.  Follow up appointment in 2 weeks - patient to call the office at [phone] to schedule.       Date: 9/1/2020    Dean Trinidad MD      Time: Discharge 20 min     DICTATED UTILIZING DRAGON DICTATION

## 2020-09-03 ENCOUNTER — TRANSCRIBE ORDERS (OUTPATIENT)
Dept: ADMINISTRATIVE | Facility: HOSPITAL | Age: 70
End: 2020-09-03

## 2020-09-03 DIAGNOSIS — M81.0 OSTEOPOROSIS, POST-MENOPAUSAL: Primary | ICD-10-CM

## 2020-09-04 PROBLEM — M81.0 SENILE OSTEOPOROSIS: Status: ACTIVE | Noted: 2020-09-04

## 2020-09-04 RX ORDER — ZOLEDRONIC ACID 5 MG/100ML
5 INJECTION, SOLUTION INTRAVENOUS ONCE
Status: CANCELLED | OUTPATIENT
Start: 2020-09-14

## 2020-09-09 ENCOUNTER — TELEPHONE (OUTPATIENT)
Dept: ORTHOPEDIC SURGERY | Facility: CLINIC | Age: 70
End: 2020-09-09

## 2020-09-09 NOTE — TELEPHONE ENCOUNTER
S/P RIGHT TKA BY [facility] ON 8/28/20  Patient is scheduled to have a Reclast injection on Monday. Wants to know if ok to have done after TKA?

## 2020-09-11 ENCOUNTER — OFFICE VISIT (OUTPATIENT)
Dept: ORTHOPEDIC SURGERY | Facility: CLINIC | Age: 70
End: 2020-09-11

## 2020-09-11 ENCOUNTER — HOSPITAL ENCOUNTER (OUTPATIENT)
Dept: OTHER | Facility: HOSPITAL | Age: 70
Discharge: HOME | End: 2020-09-11
Attending: PHYSICIAN ASSISTANT

## 2020-09-11 VITALS — WEIGHT: 150 LBS | BODY MASS INDEX: 24.99 KG/M2 | HEIGHT: 65 IN | TEMPERATURE: 96.9 F

## 2020-09-11 DIAGNOSIS — Z96.651 STATUS POST TOTAL RIGHT KNEE REPLACEMENT: Primary | ICD-10-CM

## 2020-09-11 PROCEDURE — 99024 POSTOP FOLLOW-UP VISIT: CPT | Performed by: PHYSICIAN ASSISTANT

## 2020-09-11 NOTE — PROGRESS NOTES
POST OPERATIVE FOLLOW UP ([facility])      NAME: Prema Hoang           : 1950    MRN: [id number]      Chief Complaint   Patient presents with   • Right Knee - Pain, Follow-up   • Post-op Knee     Date of Surgery: 2020  ?     HPI  Prema Hoang presents for 2 week postoperative follow up s/p right total knee arthroplasty performed by Dean Trinidad MD. The patient has had a relatively normal postoperative course.  The patient has had no current complaints. The patient has had improving normal postoperative pain.  The patient has had no issues with the wound.  She is okay on pain medication and does not need a refill.        Physical Exam  General: alert, appears stated age and cooperative  Incision/Skin: healing well, no drainage, no erythema, no seroma, no swelling, incision well approximated  Musculoskeletal:   Calf is soft and nontender with a negative Homans sign. Appropriate amounts of swelling and bruising are noted.  There is no clicking, popping or catching. There is no instability of the components.   Anterior and posterior drawer signs are negative.   Dorsalis pedis and posterior tibial artery pulses are palpable.   Common peroneal nerve function is well preserved.   Range of motion is 0-107 degrees of flexion.  Gait is cautious but otherwise fairly normal.       Diagnostic Data:  Right knee xrays 2 views were ordered by Wei Hand PA-C and performed at [facility] 2020. These images were independently viewed and interpreted by myself, my impression as follows:  Indication: Evaluation of right total knee arthroplasty   Findings: Well positioned implant with good cement mantle  Bony Lesion: No  Soft tissues: within normal limits  Joint spaces: WNL  Hardware appropriately positioned: yes  Prior studies available for comparison: yes          Assessment/Plan   Assessment:    1. Status post total right knee replacement          Plan:   Orders Placed This Encounter    Procedures   • XR Knee 1 or 2 View Right      • Wound care instructions given  • Ice and/or modalities as beneficial  • Watch for signs and symptoms of infection  • Elevate leg for residual swelling  • Physical therapy program ongoing  • Weight bearing parameters reviewed  • Take prescribed medications as instructed, but only as tolerated  • Aftercare and dental prophylaxis for joint replacement surgery.  • Discussion of orthopaedic goals and activities and patient and/or guardian expressed appreciation.  • Follow up in 4 weeks with [facility] in [place] office  • Staples are to be removed in 1 week         Wei Hand PA-C  09/11/2020     Electronically signed by Wei Hand PA-C, 09/11/20, 10:50 AM.    EMR Dragon/Transcription disclaimer:  Much of this encounter note is an electronic transcription/translation of spoken language to printed text. The electronic translation of spoken language may permit erroneous, or at times, nonsensical words or phrases to be inadvertently transcribed; Although I have reviewed the note for such errors, some may still exist.

## 2020-09-14 ENCOUNTER — HOSPITAL ENCOUNTER (OUTPATIENT)
Dept: INFUSION THERAPY | Facility: HOSPITAL | Age: 70
Discharge: HOME | End: 2020-09-14

## 2020-09-14 ENCOUNTER — TREATMENT (OUTPATIENT)
Dept: PHYSICAL THERAPY | Facility: CLINIC | Age: 70
End: 2020-09-14

## 2020-09-14 ENCOUNTER — TELEPHONE (OUTPATIENT)
Dept: ORTHOPEDIC SURGERY | Facility: CLINIC | Age: 70
End: 2020-09-14

## 2020-09-14 DIAGNOSIS — Z96.651 S/P TOTAL KNEE ARTHROPLASTY, RIGHT: Primary | ICD-10-CM

## 2020-09-14 DIAGNOSIS — M25.561 ACUTE PAIN OF RIGHT KNEE: ICD-10-CM

## 2020-09-14 DIAGNOSIS — R26.9 GAIT DISTURBANCE: ICD-10-CM

## 2020-09-14 PROCEDURE — 97161 PT EVAL LOW COMPLEX 20 MIN: CPT | Performed by: PHYSICAL THERAPIST

## 2020-09-14 PROCEDURE — G0283 ELEC STIM OTHER THAN WOUND: HCPCS | Performed by: PHYSICAL THERAPIST

## 2020-09-14 PROCEDURE — 97110 THERAPEUTIC EXERCISES: CPT | Performed by: PHYSICAL THERAPIST

## 2020-09-14 PROCEDURE — 97140 MANUAL THERAPY 1/> REGIONS: CPT | Performed by: PHYSICAL THERAPIST

## 2020-09-14 NOTE — PROGRESS NOTES
Physical Therapy Initial Evaluation and Plan of Care    Patient: Prema Hoang   : 1950  Diagnosis/ICD-10 Code:  S/P total knee arthroplasty, right [Z96.651]  Referring practitioner: Dean Trinidad MD    Subjective Evaluation    History of Present Illness  Date of surgery: 2020  Mechanism of injury: Chronic history of right knee pain  Has had injections in both knees  Underwent right TKR - hospitalized 1 night  Home Health started 2 days later  2 weeks of home health PT  To Dr Doty's PA 20 - staples not yet removed  Walks regularly, gardens, sews, house hold activities   Now taking tylenol and Advil as she had GI issues with the pain meds      Patient Occupation: Retired - HR Dirctor at a [employer]  Current pain rating: 3  At best pain ratin  At worst pain ratin  Location: anterior, medial and lateral knee, swelling and hematoma  Quality: dull ache, cramping, tight and radiating  Relieving factors: ice, rest and support  Aggravating factors: movement, stairs, squatting, ambulation and prolonged positioning  Progression: improved    Social Support  Lives in: multiple-level home  Lives with: spouse    Diagnostic Tests  X-ray: abnormal    Treatments  Previous treatment: physical therapy and medication  Current treatment: medication and physical therapy  Discharged from (in last 30 days): home health care  Patient Goals  Patient goal: Knee to be back like it was, walk wihtout pain 1-2 miles, climb stairs           Objective          Observations     Right Knee   Positive for edema and effusion.     Additional Knee Observation Details  Walks with straight cane  Slightly antalgic gait pattern on the right with decreased stance phase and push off of the right  Swollen knee - hematoma distal medial knee lateral proximal knee  Still has multiple staples in place    Palpation     Right Tenderness of the distal biceps femoris and distal semimembranosus.     Tenderness     Right Knee   Tenderness in  the medial patella and superior patella.     Additional Tenderness Details  Has small hematomas in distal medial and proximal lateral knee which are tender to palpation    Neurological Testing     Sensation     Knee     Right Knee   Diminished: light touch     Comments   Right light touch: lateral knee.     Active Range of Motion     Right Knee   Flexion: 113 degrees   Extension: 8 degrees   Extensor la degrees     Passive Range of Motion     Right Knee   Flexion: 115 degrees   Extension: 6 degrees     Patellar Mobility     Right Knee Patellar tendons within functional limits include the medial, lateral, superior and inferior.     Strength/Myotome Testing     Right Knee   Flexion: 3+  Extension: 3+  Quadriceps contraction: fair    Tests     Right Knee   Negative valgus stress test at 0 degrees and varus stress test at 0 degrees.     Swelling     Left Knee Girth Measurement (cm)   Joint line: 37 (mid patella) cm    Right Knee Girth Measurement (cm)   Joint line: 40 (mid patella) cm          Assessment & Plan     Assessment  Impairments: abnormal gait, abnormal muscle firing, abnormal or restricted ROM, activity intolerance, impaired balance, impaired physical strength, lacks appropriate home exercise program, pain with function and weight-bearing intolerance  Assessment details: 70 y.o. female seen 2 weeks post Right TKA presents with: 1. Constant right knee pain, 2. Decreased active and passive knee AROM, 3. Antalgic gait pattern, 4. Swelling in the knee with medial and lateral hematomas, 5. Difficulty with quad contraction, 6. Decreased tolerance for many normal ADL's to include exercise walking, gardening and housework  Prognosis: good  Functional Limitations: walking, standing and stooping  Goals  Plan Goals: Short Term Goals: 3 weeks  Patient will be able to tolerate initial exercises  Patient will have pain <5/10  Patient will be able to walk without her cane with a normal gait pattern  Patient will be  able to flex her knee to 120*    Long Term Goals: 6 weeks  Patient will be independent in performing home exercise program.  Patient will have functional pain free knee AROM  Patient will be able to walk on level surfaces and up/down steps without increased symptoms  Patient will be able to tolerate performing light yard work without increased symptoms     Plan  Therapy options: will be seen for skilled physical therapy services  Planned modality interventions: TENS and cryotherapy  Planned therapy interventions: manual therapy, soft tissue mobilization, home exercise program, strengthening, stretching, joint mobilization and gait training  Frequency: 3x week  Duration in visits: 18  Duration in weeks: 6  Treatment plan discussed with: patient  Plan details: Discussed continuing existing HEP with modification to the SLR exercise        Manual Therapy:    10     mins  59474;  Therapeutic Exercise:    25     mins  98328;     Neuromuscular Tapan:    0    mins  27162;    Therapeutic Activity:     5     mins  71721;  Rehab process     Evaluation Time:     20  mins  Timed Treatment:   40   mins   Total Treatment:     90   mins    PT SIGNATURE: Cecelia Parker, PT   DATE TREATMENT INITIATED: 9/14/2020    Initial Certification  Certification Period: 12/13/2020  I certify that the therapy services are furnished while this patient is under my care.  The services outlined above are required by this patient, and will be reviewed every 90 days.     PHYSICIAN: Dean Trinidad MD      DATE:     Please sign and return via fax to [phone].. Thank you, [facility].

## 2020-09-14 NOTE — TELEPHONE ENCOUNTER
Please have the patient make an appointment to either come and see Wei for the incision check or myself towards the end of this week or early part of next week.  Please encourage her to continue with her physical therapy at this point.  Thank you

## 2020-09-15 ENCOUNTER — TRANSCRIBE ORDERS (OUTPATIENT)
Dept: ADMINISTRATIVE | Facility: HOSPITAL | Age: 70
End: 2020-09-15

## 2020-09-15 DIAGNOSIS — M81.0 OSTEOPOROSIS OF LUMBAR SPINE: Primary | ICD-10-CM

## 2020-09-15 NOTE — TELEPHONE ENCOUNTER
Pt saw Wei on 9/11 for PO and is scheduled to see you 9/23. Ok for her to wait a week or do you want her seen sooner?

## 2020-09-16 ENCOUNTER — TREATMENT (OUTPATIENT)
Dept: PHYSICAL THERAPY | Facility: CLINIC | Age: 70
End: 2020-09-16

## 2020-09-16 DIAGNOSIS — Z96.651 S/P TOTAL KNEE ARTHROPLASTY, RIGHT: Primary | ICD-10-CM

## 2020-09-16 DIAGNOSIS — R26.9 GAIT DISTURBANCE: ICD-10-CM

## 2020-09-16 DIAGNOSIS — M25.561 ACUTE PAIN OF RIGHT KNEE: ICD-10-CM

## 2020-09-16 PROCEDURE — 97110 THERAPEUTIC EXERCISES: CPT | Performed by: PHYSICAL THERAPIST

## 2020-09-16 PROCEDURE — G0283 ELEC STIM OTHER THAN WOUND: HCPCS | Performed by: PHYSICAL THERAPIST

## 2020-09-16 PROCEDURE — 97140 MANUAL THERAPY 1/> REGIONS: CPT | Performed by: PHYSICAL THERAPIST

## 2020-09-16 NOTE — PROGRESS NOTES
Physical Therapy Daily Progress Note    VISIT#: 2    Subjective   Prema Hoang reports: that she had some soreness from the initial session but no significant pain.  State that she did climb a couple of steps with the right LE.      Objective   AROM - 5 - 121*  Extensor lag 17*    See Exercise, Manual, and Modality Logs for complete treatment.     Patient Education: gravity assist flexion in supine    Assessment/Plan  Much improved AROM without increased pain.  Still has difficulty with knee extension but has a smooth gait pattern.  Leg Press on physioball aggravated non involved     Progress strengthening /stabilization /functional activity  Add prone knee hang and STM posterior joint to increase knee extension         Manual Therapy:    12     mins  80875;  Therapeutic Exercise:    35     mins  14279;     Neuromuscular Tapan:    0    mins  73026;    Therapeutic Activity:     0     mins  32656;     Dry Needling                  0_  mins    Timed Treatment:   47   mins   Total Treatment:     70   mins    Cecelia Parker, PT  [place] License # [id number]  Physical Therapist

## 2020-09-21 ENCOUNTER — TREATMENT (OUTPATIENT)
Dept: PHYSICAL THERAPY | Facility: CLINIC | Age: 70
End: 2020-09-21

## 2020-09-21 DIAGNOSIS — R26.9 GAIT DISTURBANCE: ICD-10-CM

## 2020-09-21 DIAGNOSIS — Z96.651 S/P TOTAL KNEE ARTHROPLASTY, RIGHT: Primary | ICD-10-CM

## 2020-09-21 DIAGNOSIS — M25.561 ACUTE PAIN OF RIGHT KNEE: ICD-10-CM

## 2020-09-21 PROCEDURE — 97110 THERAPEUTIC EXERCISES: CPT | Performed by: PHYSICAL THERAPIST

## 2020-09-21 PROCEDURE — 97530 THERAPEUTIC ACTIVITIES: CPT | Performed by: PHYSICAL THERAPIST

## 2020-09-21 PROCEDURE — 97140 MANUAL THERAPY 1/> REGIONS: CPT | Performed by: PHYSICAL THERAPIST

## 2020-09-21 PROCEDURE — G0283 ELEC STIM OTHER THAN WOUND: HCPCS | Performed by: PHYSICAL THERAPIST

## 2020-09-21 NOTE — PROGRESS NOTES
MD Letter  Patient: Prema Hoang   : 1950    Date of Initial Visit: Type: THERAPY  Noted: 2020  Today's Date: 2020  Patient seen for 3 sessions    Treatment has included: therapeutic exercise, neuromuscular re-education, manual therapy, electrical stimulation and cryotherapy    Subjective   Prema black states that she is moving the leg much mbetter now but still has constant pain in the anterior knee and seems to have an increase in her intermittent sciatica type pain.  She states that she is having sleep interruption by pain but does not take the opioid medication due to constipation.  She state that she only seldomnly uses her cane at home and never uses the walker any more.    Objective - she presents with a slightly antalgic gait on the right with decreased knee extension noted on heel strike. She has slight swelling in the knee with 6 residual staples in place.  AROM - Heel slide 10*-130*  Extensor lag 15*  Strength - quads 4/5, hamstrings - 4/5   Sensation - decreased in lateral knee  Palpation - tenderness in medial joint line, slight increased tissue temperature  Special tests - negative for any instability in the knee  Activity tolerances - she is able to climb up/down steps in a reciprocal fashion but still has pain, especially with descending the steps.  She still has pain interrupting her sleep but does not take any pain medication.    Assessment/Plan  Patient has demonstrated moderate improvement since the initiation of therapy.  The pain has persisted but has decreased in intensity.  The motion has increased but still lacks some extension for stability.  The activity tolerances have increased but not to desired levels.  I feel that the patient would benefit from continued therapy.  If you have any questions concerning the care, please do not hesitate to contact me.          PT Signature: Cecelia Parker, PT        Manual Therapy:    12     mins  20619;  Therapeutic Exercise:          mins  04771;     Neuromuscular Tapan:    5    mins  20008;    Therapeutic Activity:     10     mins  84136;   Assessed for MD    Timed Treatment:   57   mins   Total Treatment:     80   mins

## 2020-09-22 ENCOUNTER — HOSPITAL ENCOUNTER (OUTPATIENT)
Dept: INFUSION THERAPY | Facility: HOSPITAL | Age: 70
Discharge: HOME | End: 2020-09-22
Admitting: OBSTETRICS & GYNECOLOGY

## 2020-09-22 VITALS
RESPIRATION RATE: 20 BRPM | OXYGEN SATURATION: 100 % | WEIGHT: 150 LBS | DIASTOLIC BLOOD PRESSURE: 80 MMHG | SYSTOLIC BLOOD PRESSURE: 119 MMHG | BODY MASS INDEX: 24.96 KG/M2 | TEMPERATURE: 98.3 F | HEART RATE: 93 BPM

## 2020-09-22 DIAGNOSIS — M81.0 SENILE OSTEOPOROSIS: Primary | ICD-10-CM

## 2020-09-22 PROCEDURE — 25010000002 ZOLEDRONIC ACID 5 MG/100ML SOLUTION: Performed by: OBSTETRICS & GYNECOLOGY

## 2020-09-22 PROCEDURE — 96365 THER/PROPH/DIAG IV INF INIT: CPT

## 2020-09-22 RX ORDER — ZOLEDRONIC ACID 5 MG/100ML
5 INJECTION, SOLUTION INTRAVENOUS ONCE
Status: COMPLETED | OUTPATIENT
Start: 2020-09-22 | End: 2020-09-22

## 2020-09-22 RX ORDER — ZOLEDRONIC ACID 5 MG/100ML
5 INJECTION, SOLUTION INTRAVENOUS ONCE
Status: CANCELLED | OUTPATIENT
Start: 2021-09-22

## 2020-09-22 RX ORDER — ZOLEDRONIC ACID 5 MG/100ML
5 INJECTION, SOLUTION INTRAVENOUS
COMMUNITY
End: 2022-11-21

## 2020-09-22 RX ADMIN — ZOLEDRONIC ACID 5 MG: 5 INJECTION, SOLUTION INTRAVENOUS at 11:41

## 2020-09-22 NOTE — PATIENT INSTRUCTIONS
Zoledronic Acid injection (Paget's Disease, Osteoporosis)  What is this medicine?  ZOLEDRONIC ACID (ALBA le dan ik AS id) lowers the amount of calcium loss from bone. It is used to treat Paget's disease and osteoporosis in women.  This medicine may be used for other purposes; ask your health care provider or pharmacist if you have questions.  COMMON BRAND NAME(S): Reclast, Zometa  What should I tell my health care provider before I take this medicine?  They need to know if you have any of these conditions:  · aspirin-sensitive asthma  · cancer, especially if you are receiving medicines used to treat cancer  · dental disease or wear dentures  · infection  · kidney disease  · low levels of calcium in the blood  · past surgery on the parathyroid gland or intestines  · receiving corticosteroids like dexamethasone or prednisone  · an unusual or allergic reaction to zoledronic acid, other medicines, foods, dyes, or preservatives  · pregnant or trying to get pregnant  · breast-feeding  How should I use this medicine?  This medicine is for infusion into a vein. It is given by a health care professional in a hospital or clinic setting.  Talk to your pediatrician regarding the use of this medicine in children. This medicine is not approved for use in children.  Overdosage: If you think you have taken too much of this medicine contact a poison control center or emergency room at once.  NOTE: This medicine is only for you. Do not share this medicine with others.  What if I miss a dose?  It is important not to miss your dose. Call your doctor or health care professional if you are unable to keep an appointment.  What may interact with this medicine?  · certain antibiotics given by injection  · NSAIDs, medicines for pain and inflammation, like ibuprofen or naproxen  · some diuretics like bumetanide, furosemide  · teriparatide  This list may not describe all possible interactions. Give your health care provider a list of all the  medicines, herbs, non-prescription drugs, or dietary supplements you use. Also tell them if you smoke, drink alcohol, or use illegal drugs. Some items may interact with your medicine.  What should I watch for while using this medicine?  Visit your doctor or health care professional for regular checkups. It may be some time before you see the benefit from this medicine. Do not stop taking your medicine unless your doctor tells you to. Your doctor may order blood tests or other tests to see how you are doing.  Women should inform their doctor if they wish to become pregnant or think they might be pregnant. There is a potential for serious side effects to an unborn child. Talk to your health care professional or pharmacist for more information.  You should make sure that you get enough calcium and vitamin D while you are taking this medicine. Discuss the foods you eat and the vitamins you take with your health care professional.  Some people who take this medicine have severe bone, joint, and/or muscle pain. This medicine may also increase your risk for jaw problems or a broken thigh bone. Tell your doctor right away if you have severe pain in your jaw, bones, joints, or muscles. Tell your doctor if you have any pain that does not go away or that gets worse.  Tell your dentist and dental surgeon that you are taking this medicine. You should not have major dental surgery while on this medicine. See your dentist to have a dental exam and fix any dental problems before starting this medicine. Take good care of your teeth while on this medicine. Make sure you see your dentist for regular follow-up appointments.  What side effects may I notice from receiving this medicine?  Side effects that you should report to your doctor or health care professional as soon as possible:  · allergic reactions like skin rash, itching or hives, swelling of the face, lips, or tongue  · anxiety, confusion, or depression  · breathing  problems  · changes in vision  · eye pain  · feeling faint or lightheaded, falls  · jaw pain, especially after dental work  · mouth sores  · muscle cramps, stiffness, or weakness  · redness, blistering, peeling or loosening of the skin, including inside the mouth  · trouble passing urine or change in the amount of urine  Side effects that usually do not require medical attention (report to your doctor or health care professional if they continue or are bothersome):  · bone, joint, or muscle pain  · constipation  · diarrhea  · fever  · hair loss  · irritation at site where injected  · loss of appetite  · nausea, vomiting  · stomach upset  · trouble sleeping  · trouble swallowing  · weak or tired  This list may not describe all possible side effects. Call your doctor for medical advice about side effects. You may report side effects to FDA at 6-542-FDA-2721.  Where should I keep my medicine?  This drug is given in a hospital or clinic and will not be stored at home.  NOTE: This sheet is a summary. It may not cover all possible information. If you have questions about this medicine, talk to your doctor, pharmacist, or health care provider.  © 2020 [employer]/Gold Standard (2015-05-16 14:19:57)

## 2020-09-23 ENCOUNTER — OFFICE VISIT (OUTPATIENT)
Dept: ORTHOPEDIC SURGERY | Facility: CLINIC | Age: 70
End: 2020-09-23

## 2020-09-23 VITALS — TEMPERATURE: 98 F | HEIGHT: 65 IN | WEIGHT: 150 LBS | BODY MASS INDEX: 24.99 KG/M2

## 2020-09-23 DIAGNOSIS — Z96.651 STATUS POST TOTAL RIGHT KNEE REPLACEMENT: Primary | ICD-10-CM

## 2020-09-23 PROCEDURE — 99024 POSTOP FOLLOW-UP VISIT: CPT | Performed by: ORTHOPAEDIC SURGERY

## 2020-09-23 RX ORDER — CYCLOBENZAPRINE HCL 10 MG
TABLET ORAL
Qty: 30 TABLET | Refills: 0 | Status: SHIPPED | OUTPATIENT
Start: 2020-09-23 | End: 2020-10-12

## 2020-09-23 NOTE — PROGRESS NOTES
POST OP TOTAL GLOBAL      NAME: Prema Hoang           : 1950    MRN: [id number]    Chief Complaint   Patient presents with   • Right Knee - Follow-up, Pain   • Post-op Knee       Date of Surgery: Patient underwent a right total knee arthroplasty on 2020.  ?  HPI: P/O  R TKA 20  Patient returns today for 4 week follow up of right total knee arthroplasty Incision(s) healed nicely with no signs of infection. Patient reports doing well with no unusual complaints. No fevers, rigors or chills. Appears to be progressing appropriately. Patient is on appropriate anticoagulation.  She has done amazingly well with physical therapy.  She is not using any significant pain medication at this point.  She is complaining of a lot of spasms in the calf muscles.  I am going to treat her with some Flexeril for short-term.      Review of Systems:      Ortho Exam:   Right knee.The patient is status post total knee arthroplasty postoperative 4 week(s). Incision is clean. Calf is soft and nontender. Homans sign is negative. There is no clicking, popping or catching. Anterior and posterior drawer signs are negative.  There is no instability of the components. Appropriate amounts of swelling and bruising are noted. Dorsalis pedis and posterior tibial artery pulses are palpable. Common peroneal nerve function is well preserved. Range of motion is from 0-125 degrees of flexion. Gait is cautious but otherwise fairly normal. There is no evidence of a deep seated joint infection.      Diagnostic Studies:  no diagnostic testing performed this visit        Assessment:  Prema was seen today for post-op knee, follow-up and pain.    Diagnoses and all orders for this visit:    Status post total right knee replacement  -     cyclobenzaprine (FLEXERIL) 10 MG tablet; 1 po QHS            Plan   · Incision care  · To use ACE wrap/RENARD stocking  · Continue ice to joint   · Stretching and strengthening exercises  · Aggressive  ROM  · Falls precautions  · You may now resume any prescription medications you were taking prior to surgery  · Continue with lifelong antibiotic prophylaxis with dental procedures following total joint replacement.  · Follow up in 8 week(s).  · Tablet Flexeril 10 mg tab 1 p.o. nightly for pain swelling and spasms.  · Controlled substance treatment options discussed in detail. Patient's signed consent to medical options on file. MELLISA form in chart.  The patient is being provided this narcotic prescription to address the acute medical condition that they are undergoing/experiencing at this time.  It is my medical and surgical assessment that more than 3 days of narcotic medication is necessary to help control the pain and discomfort that this patient is experiencing at this point.  Risks of narcotic medication usage outlined.  Possibility of physical and psychological dependence and abuse, especially long term, emphasized to the patient.  I have explained to the patient that we will try to wean them off the narcotic medication as soon as possible and introduce non-narcotic modalities of pain control.  At this point in the patient's clinical spectrum, however, alternative available treatments are inadequate to control their pain and symptoms.  I have also discussed the possibility of random drug testing as well as pill counts to prevent misuse and misappropriation of the narcotic medications prescribed to the patient.  · The patient states that her left knee is bothering her quite significantly as well and she is considering intra-articular Visco supplementation injections and eventually she will need knee replacement surgery.  She will let me know when she is ready for that form of surgical intervention.    Date of encounter: 09/23/2020   Dean Trinidad MD

## 2020-09-25 ENCOUNTER — TREATMENT (OUTPATIENT)
Dept: PHYSICAL THERAPY | Facility: CLINIC | Age: 70
End: 2020-09-25

## 2020-09-25 DIAGNOSIS — Z96.651 S/P TOTAL KNEE ARTHROPLASTY, RIGHT: Primary | ICD-10-CM

## 2020-09-25 DIAGNOSIS — R26.9 GAIT DISTURBANCE: ICD-10-CM

## 2020-09-25 PROCEDURE — 97110 THERAPEUTIC EXERCISES: CPT | Performed by: PHYSICAL THERAPIST

## 2020-09-25 PROCEDURE — 97112 NEUROMUSCULAR REEDUCATION: CPT | Performed by: PHYSICAL THERAPIST

## 2020-09-25 PROCEDURE — 97140 MANUAL THERAPY 1/> REGIONS: CPT | Performed by: PHYSICAL THERAPIST

## 2020-09-25 NOTE — PROGRESS NOTES
Physical Therapy Daily Progress Note        VISIT#: 4      Prema Hoang reports: Pt reports her knee is hurting today. She reports she is moving  better but the pain is still there.  Current Pain Level:    4-5/10; Worst:   4-5/10; Best:  0/10  Location Of Pain: R knee  Response to Previous Session: Good. No issues  Functional Deficits/Irritating Factors: Walking, standing, pushing, pulling, squatting  Progression: Improving  Compliance with HEP Reported: Yes    Objective   Presents: Antalgic gait, forward  head  Increased sets/reps of:  SAQs, prone hang time  Increased resistance on:  none  Added to Program: none    AROM R knee flexion = 124 deg; extension = lacking 3 deg    See Exercise, Manual, and Modality Logs for complete treatment.     Patient Education: Pt was educated on exercise biomechanical correctness, intensity, and speed.     Assessment:  Pt is improving with her functional mobility but still complaining of a lot of pain. She was able to move through all of her exercises but did c/o pain quite frequently. Her flexion was decreased today (per pt) to 124 deg and her extension improved to lacking 3 deg.  Pt will continue to benefit from skilled PT interventions to address current functional deficits and impairments.       Plan: Progress to/Continue with current program. Add Hip ext and abd        Manual Therapy:    8     mins  70445;  Therapeutic Exercise:    35     mins  38453;     Neuromuscular Tapan:    10    mins  61708;    Therapeutic Activity:     0     mins  96547;     Electrical Stimulation: __0____ mins 52169  Ultrasound:   0 mins 66706  Work Conditionin mins 84217  Iontophoresis: 0 mins 36645  Timed Treatment:   53   mins   Total Treatment:     55   mins    Molly Cornejo PTA  [place] License # [id number]  Physical Therapist Assistant

## 2020-09-28 ENCOUNTER — TREATMENT (OUTPATIENT)
Dept: PHYSICAL THERAPY | Facility: CLINIC | Age: 70
End: 2020-09-28

## 2020-09-28 DIAGNOSIS — Z96.651 S/P TOTAL KNEE ARTHROPLASTY, RIGHT: Primary | ICD-10-CM

## 2020-09-28 DIAGNOSIS — R26.9 GAIT DISTURBANCE: ICD-10-CM

## 2020-09-28 PROCEDURE — 97110 THERAPEUTIC EXERCISES: CPT | Performed by: PHYSICAL THERAPIST

## 2020-09-28 PROCEDURE — 97140 MANUAL THERAPY 1/> REGIONS: CPT | Performed by: PHYSICAL THERAPIST

## 2020-09-28 PROCEDURE — 97112 NEUROMUSCULAR REEDUCATION: CPT | Performed by: PHYSICAL THERAPIST

## 2020-09-28 PROCEDURE — 97012 MECHANICAL TRACTION THERAPY: CPT | Performed by: PHYSICAL THERAPIST

## 2020-09-28 NOTE — PROGRESS NOTES
Physical Therapy Daily Progress Note        VISIT#: 5      Prema Hoang reports: He knee is doing better. She thinks the bike helps her a lot. She is able to ambulate further now  Current Pain Level:   No pain just tightness and throbbing all of the time unless she lies flat with her knee straight and on a pillow  Location Of Pain: R knee, medial and slightly distal to patella  Response to Previous Session: Good. No issues  Functional Deficits/Irritating Factors: walking, standing, pushing, pulling and squatting  Progression: Improving  Compliance with HEP Reported: Yes    Objective   Presents: Ambulating with antalgia, forward head  Increased sets/reps of:  SLR, SAQs, squats  Increased resistance on:  SAQs  Added to Program: Hip Abduction and Extension    AROM R knee extension = lacking one deg; flexion = 125 deg      See Exercise, Manual, and Modality Logs for complete treatment.     Patient Education: Pt was educated on exercise biomechanical correctness, intensity, and speed. Education on kinesiotape including wearing time, side effects to look for and purpose.    Assessment:  Pt has improved again with her AROM but demonstrated an extension lag with her SLR. Pt will benefit from additional quad strengthening. She was tender to palpitation all around her knee during STM, patella mobs and scar massage. Pt did well with all of her interventions. Applied kinesiotape this session for edema, proprioception and pain. Pt instructed on possible adverse effects and to remove if any occur. Pt v/u. Pt will continue to benefit from skilled PT interventions to address current functional deficits and impairments.       Plan: Progress to/Continue with current program. Heel TAPS next session        Manual Therapy:    15     mins  86220;  Therapeutic Exercise:    30     mins  59552;     Neuromuscular Tapan:    15    mins  81917;    Therapeutic Activity:     0     mins  48926;     Electrical Stimulation: __0____ mins  10941  Ultrasound:   0 mins 13182  Work Conditionin mins 71931  Iontophoresis: 0 mins 27446  Timed Treatment:   60   mins   Total Treatment:     70   mins    ADRYAN Doherty License # [id number]  Physical Therapist Assistant

## 2020-10-02 ENCOUNTER — TREATMENT (OUTPATIENT)
Dept: PHYSICAL THERAPY | Facility: CLINIC | Age: 70
End: 2020-10-02

## 2020-10-02 DIAGNOSIS — Z96.651 S/P TOTAL KNEE ARTHROPLASTY, RIGHT: Primary | ICD-10-CM

## 2020-10-02 PROCEDURE — 97140 MANUAL THERAPY 1/> REGIONS: CPT | Performed by: PHYSICAL THERAPIST

## 2020-10-02 PROCEDURE — 97110 THERAPEUTIC EXERCISES: CPT | Performed by: PHYSICAL THERAPIST

## 2020-10-02 PROCEDURE — 97112 NEUROMUSCULAR REEDUCATION: CPT | Performed by: PHYSICAL THERAPIST

## 2020-10-02 NOTE — PROGRESS NOTES
"Physical Therapy Daily Progress Note        VISIT#: 6      Prema Hoang reports: No changes since last visit. She did state the kinesiotape helped decrease her knee pain.   Current Pain Level:    4-5/10; Worst:   7/10; Best:  0/10  Location Of Pain: R knee  Response to Previous Session: Good. No issues  Functional Deficits/Irritating Factors: Walking, standing  Progression: slowly  Compliance with HEP Reported: Yes, riding her bike mostly    Objective   Presents: Walking with antalgia  Increased sets/reps of:  none   Increased resistance on:  SLR, SAQs  Added to Program: Heel Taps for eccentric quad strengthening, Side stepping        See Exercise, Manual, and Modality Logs for complete treatment.     Patient Education: Pt was educated on exercise biomechanical correctness, intensity, and speed. Verbal and tactile cues to keep knee over toes during 6\" step ups.     Assessment:  Pt very tender in HS and hip adductor mms this date. Performed STM to the area to help decrease tenderness. Pt appears to have strength deficits also in hip abductors as her knee goes into genu valgus during step ups and squats. Pt will benefit from continues hip strengthening and STM to her adductors and HS.  Pt will continue to benefit from skilled PT interventions to address current functional deficits and impairments.       Plan: Progress to/Continue with current program. Monster Walks next session. Reapply kinesiotape        Manual Therapy:    12     mins  64956;  Therapeutic Exercise:    30     mins  63350;     Neuromuscular Tapan:    15    mins  83710;    Therapeutic Activity:     0     mins  15838;     Electrical Stimulation: __0____ mins 18357  Ultrasound:   0 mins 83365  Work Conditionin mins 77736  Iontophoresis: 0 mins 92066  Timed Treatment:   57   mins   Total Treatment:     60   mins    ADRYAN Doherty License # [id number]  Physical Therapist Assistant      "

## 2020-10-05 ENCOUNTER — TREATMENT (OUTPATIENT)
Dept: PHYSICAL THERAPY | Facility: CLINIC | Age: 70
End: 2020-10-05

## 2020-10-05 DIAGNOSIS — Z96.651 S/P TOTAL KNEE ARTHROPLASTY, RIGHT: Primary | ICD-10-CM

## 2020-10-05 DIAGNOSIS — R26.9 GAIT DISTURBANCE: ICD-10-CM

## 2020-10-05 PROCEDURE — 97140 MANUAL THERAPY 1/> REGIONS: CPT | Performed by: PHYSICAL THERAPIST

## 2020-10-05 PROCEDURE — 97110 THERAPEUTIC EXERCISES: CPT | Performed by: PHYSICAL THERAPIST

## 2020-10-05 PROCEDURE — 97112 NEUROMUSCULAR REEDUCATION: CPT | Performed by: PHYSICAL THERAPIST

## 2020-10-05 NOTE — PROGRESS NOTES
Physical Therapy Daily Progress Note        VISIT#: 7      Prema Hoang reports: Her knee is doing better. Stated she thinks the tape helped her pain and walking ability.   Current Pain Level:    4-5/10; Worst:   4-5/10; Best:  0/10  Location Of Pain: R knee  Response to Previous Session: Good. No issues  Functional Deficits/Irritating Factors: Stairs  Progression: Improving  Compliance with HEP Reported: Yes    Objective   Presents: Ambulating with antalgia  Increased sets/reps of:  none   Increased resistance on:  none  Added to Program: Squats w/o bar and to mat table        See Exercise, Manual, and Modality Logs for complete treatment.     Patient Education: Pt was educated on exercise biomechanical correctness, intensity, and speed.     Assessment:  Pt demonstrates progression towards her goals. Her range of motion and mobility continue to improve. Pt proceeded through all of her exercises w/o issues. Her tenderness to pressure in adductors and HS have improved too. She did have some increased pain and difficulty performing Heel Taps so after 1 rep, deferred.  Pt will continue to benefit from skilled PT interventions to address current functional deficits and impairments.       Plan: Progress to/Continue with current program. Kinesiotape to knee. Try heel taps again for eccentric knee control .        Manual Therapy:    15     mins  64925;  Therapeutic Exercise:    30     mins  56249;     Neuromuscular Tapan:    15    mins  52353;    Therapeutic Activity:     0     mins  63183;     Electrical Stimulation: __0____ mins 06251  Ultrasound:   0 mins 82518  Work Conditionin mins 81848  Iontophoresis: 0 mins 84587  Timed Treatment:   60   mins   Total Treatment:     65   mins    Molly Cornejo PTA  [place] License # [id number]  Physical Therapist Assistant

## 2020-10-07 ENCOUNTER — TELEPHONE (OUTPATIENT)
Dept: ORTHOPEDICS | Facility: OTHER | Age: 70
End: 2020-10-07

## 2020-10-12 ENCOUNTER — TREATMENT (OUTPATIENT)
Dept: PHYSICAL THERAPY | Facility: CLINIC | Age: 70
End: 2020-10-12

## 2020-10-12 DIAGNOSIS — Z96.651 STATUS POST TOTAL RIGHT KNEE REPLACEMENT: ICD-10-CM

## 2020-10-12 DIAGNOSIS — R26.9 GAIT DISTURBANCE: ICD-10-CM

## 2020-10-12 DIAGNOSIS — Z96.651 S/P TOTAL KNEE ARTHROPLASTY, RIGHT: Primary | ICD-10-CM

## 2020-10-12 PROCEDURE — 97112 NEUROMUSCULAR REEDUCATION: CPT | Performed by: PHYSICAL THERAPIST

## 2020-10-12 PROCEDURE — 97140 MANUAL THERAPY 1/> REGIONS: CPT | Performed by: PHYSICAL THERAPIST

## 2020-10-12 PROCEDURE — 97110 THERAPEUTIC EXERCISES: CPT | Performed by: PHYSICAL THERAPIST

## 2020-10-12 RX ORDER — CYCLOBENZAPRINE HCL 10 MG
TABLET ORAL
Qty: 30 TABLET | Refills: 0 | OUTPATIENT
Start: 2020-10-12 | End: 2021-12-14

## 2020-10-15 ENCOUNTER — OFFICE VISIT (OUTPATIENT)
Dept: ORTHOPEDIC SURGERY | Facility: CLINIC | Age: 70
End: 2020-10-15

## 2020-10-15 DIAGNOSIS — M17.12 PRIMARY OSTEOARTHRITIS OF LEFT KNEE: ICD-10-CM

## 2020-10-15 DIAGNOSIS — Z96.651 STATUS POST TOTAL RIGHT KNEE REPLACEMENT: Primary | ICD-10-CM

## 2020-10-15 PROCEDURE — 99024 POSTOP FOLLOW-UP VISIT: CPT | Performed by: ORTHOPAEDIC SURGERY

## 2020-10-15 PROCEDURE — 20610 DRAIN/INJ JOINT/BURSA W/O US: CPT | Performed by: ORTHOPAEDIC SURGERY

## 2020-10-15 RX ADMIN — LIDOCAINE HYDROCHLORIDE 2 ML: 10 INJECTION, SOLUTION INFILTRATION; PERINEURAL at 11:34

## 2020-10-15 RX ADMIN — METHYLPREDNISOLONE ACETATE 160 MG: 80 INJECTION, SUSPENSION INTRA-ARTICULAR; INTRALESIONAL; INTRAMUSCULAR; SOFT TISSUE at 11:34

## 2020-10-15 NOTE — PROGRESS NOTES
POST OP TOTAL GLOBAL      NAME: Prema Hoang           : 1950    MRN: [id number]    Chief Complaint   Patient presents with   • Left Knee - Follow-up   • Right Knee - Follow-up       Date of Surgery: 20  ?  HPI:   Patient returns today for 7 week follow up of right total knee arthroplasty Incision(s) healed nicely with no signs of infection. Patient reports doing well with no unusual complaints. No fevers, rigors or chills. Appears to be progressing appropriately. Patient is on appropriate anticoagulation.       Review of Systems:      Ortho Exam:   Right knee.The patient is status post total knee arthroplasty postoperative 7 week(s). Incision is clean. Calf is soft and nontender. Homans sign is negative. There is no clicking, popping or catching. Anterior and posterior drawer signs are negative.  There is no instability of the components. Appropriate amounts of swelling and bruising are noted. Dorsalis pedis and posterior tibial artery pulses are palpable. Common peroneal nerve function is well preserved. Range of motion is from  degrees of flexion. Gait is cautious but otherwise fairly normal. There is no evidence of a deep seated joint infection.    Left knee. Patient has crepitus throughout range of motion. Positive patellar grind test. Mild effusion. Lachman is negative. Pivot shift is negative. Anterior and posterior drawer signs are negative. Significant joint line tenderness is noted on the medial aspect of the knee. Patient has a varus orientation of the knee. There is fullness and tenderness in the Popliteal fossa. Mild distention of a Popliteal cyst is noted in this location. Range of motion in flexion is from 0- 110 degrees. Neurovascular status is intact.  Dorsalis pedis and posterior tibial artery pulses are palpable. Common peroneal nerve function is well preserved. Patient's gait is cautious and antalgic. Skin and soft tissues are mildly swollen, consistent with synovitis and  effusion. The patient has a significant limp with the first few steps after starting the gait cycle. Getting out of a chair takes a lot of effort due to pain on knee flexion.  Diagnostic Studies:  no diagnostic testing performed this visit      Large Joint Arthrocentesis: L knee  Date/Time: 10/15/2020 11:34 AM  Consent given by: patient  Site marked: site marked  Timeout: Immediately prior to procedure a time out was called to verify the correct patient, procedure, equipment, support staff and site/side marked as required   Supporting Documentation  Indications: pain   Procedure Details  Location: knee - L knee  Preparation: Patient was prepped and draped in the usual sterile fashion  Needle size: 25 G  Approach: anteromedial  Medications administered: 2 mL lidocaine 1 %; 160 mg methylPREDNISolone acetate 80 MG/ML  Patient tolerance: patient tolerated the procedure well with no immediate complications          Assessment:  Diagnoses and all orders for this visit:    1. Status post total right knee replacement (Primary)    2. Primary osteoarthritis of left knee  -     Large Joint Arthrocentesis: L knee            · Plan    · Injected patients left knee with a steroid from anteromedial approach   · To use ACE wrap/RENARD stocking  · Continue ice to joint   · Stretching and strengthening exercises  · Aggressive ROM  · Falls precautions  · You may now resume any prescription medications you were taking prior to surgery  · Continue with lifelong antibiotic prophylaxis with dental procedures following total joint replacement.  · Follow up in 4 week(s)    Date of encounter: 10/15/2020   Dean Trinidad MD

## 2020-10-18 RX ORDER — METHYLPREDNISOLONE ACETATE 80 MG/ML
160 INJECTION, SUSPENSION INTRA-ARTICULAR; INTRALESIONAL; INTRAMUSCULAR; SOFT TISSUE
Status: COMPLETED | OUTPATIENT
Start: 2020-10-15 | End: 2020-10-15

## 2020-10-18 RX ORDER — LIDOCAINE HYDROCHLORIDE 10 MG/ML
2 INJECTION, SOLUTION INFILTRATION; PERINEURAL
Status: COMPLETED | OUTPATIENT
Start: 2020-10-15 | End: 2020-10-15

## 2020-10-19 ENCOUNTER — TREATMENT (OUTPATIENT)
Dept: PHYSICAL THERAPY | Facility: CLINIC | Age: 70
End: 2020-10-19

## 2020-10-19 DIAGNOSIS — Z96.651 S/P TOTAL KNEE ARTHROPLASTY, RIGHT: Primary | ICD-10-CM

## 2020-10-19 DIAGNOSIS — R26.9 GAIT DISTURBANCE: ICD-10-CM

## 2020-10-19 PROCEDURE — 97112 NEUROMUSCULAR REEDUCATION: CPT | Performed by: PHYSICAL THERAPIST

## 2020-10-19 PROCEDURE — 97530 THERAPEUTIC ACTIVITIES: CPT | Performed by: PHYSICAL THERAPIST

## 2020-10-19 PROCEDURE — 97110 THERAPEUTIC EXERCISES: CPT | Performed by: PHYSICAL THERAPIST

## 2020-10-19 NOTE — PROGRESS NOTES
Physical Therapy Daily Progress Note        VISIT#: 9      Prema Hoang reports: Her MD visit went well and he is pleased with her progress  Current Pain Level:    2-3/10; Worst:   5/10; Best:  0/10  Location Of Pain: R knee, medial and lateral to her patella  Response to Previous Session: Good. No issues  Functional Deficits/Irritating Factors: Prolonged activity/standing, stairs  Progression: Improving  Compliance with HEP Reported: Yes.     Objective   Presents: Ambulating with antalgia  Increased sets/reps of:  none   Increased resistance on:  Clams  Added to Program: none        See Exercise, Manual, and Modality Logs for complete treatment.     Patient Education: Pt was educated on exercise biomechanical correctness, intensity, and speed.     Assessment:  Pt continues to have extension lag with SLR. She also has difficulty locking out her knee with forward and lateral step ups and heel taps. Removed resistance during SAQs to try and facilitate more AROM.  Pt will continue to benefit from skilled PT interventions to address current functional deficits and impairments.       Plan: Progress to/Continue with current program. TKE against wall with ball to improve recruitment of quad. Scar massage, [place] to quad        Manual Therapy:    0     mins  70557;  Therapeutic Exercise:    30     mins  07855;     Neuromuscular Tapan:    15    mins  99383;    Therapeutic Activity:     15     mins  49693;     Electrical Stimulation: __0____ mins 04393  Ultrasound:   0 mins 12270  Work Conditionin mins 44156  Iontophoresis: 0 mins 15686  Timed Treatment:   60   mins   Total Treatment:     70   mins    ADRYAN Doherty License # [id number]  Physical Therapist Assistant

## 2020-10-21 ENCOUNTER — TELEPHONE (OUTPATIENT)
Dept: PHYSICAL THERAPY | Facility: CLINIC | Age: 70
End: 2020-10-21

## 2020-10-23 ENCOUNTER — TREATMENT (OUTPATIENT)
Dept: PHYSICAL THERAPY | Facility: CLINIC | Age: 70
End: 2020-10-23

## 2020-10-23 DIAGNOSIS — R26.9 GAIT DISTURBANCE: ICD-10-CM

## 2020-10-23 DIAGNOSIS — Z96.651 S/P TOTAL KNEE ARTHROPLASTY, RIGHT: Primary | ICD-10-CM

## 2020-10-23 PROCEDURE — 97140 MANUAL THERAPY 1/> REGIONS: CPT | Performed by: PHYSICAL THERAPIST

## 2020-10-23 PROCEDURE — 97112 NEUROMUSCULAR REEDUCATION: CPT | Performed by: PHYSICAL THERAPIST

## 2020-10-23 PROCEDURE — 97110 THERAPEUTIC EXERCISES: CPT | Performed by: PHYSICAL THERAPIST

## 2020-10-23 NOTE — PROGRESS NOTES
Physical Therapy Daily Progress Note        VISIT#: 10      Prema Hoang reports: Her knee is still hurting a lot - more than she thinks it should be at 2 months out today from her surgery. Stated her knee was in pretty bad shape before her surgery and had been getting knee injections for 5 years.   Current Pain Level:    2/10; Worst:   5610; Best:  0/10  Location Of Pain: R knee  Response to Previous Session: Increased irritation and pain although she was hurting when she came in  Functional Deficits/Irritating Factors: ambulating, prolonged standing, stairs  Progression: Slowly improving  Compliance with HEP Reported: Yes    Objective   Presents: Ambulating with antalgia  Increased sets/reps of:  none   Increased resistance on:  none  Added to Program: Surinamese estim, KT tape to IT band        See Exercise, Manual, and Modality Logs for complete treatment.     Patient Education: Pt was educated on exercise biomechanical correctness, intensity, and speed. Educated on purpose and use of Surinamese Estim. Educated on how mms lose their ability to fully fire or fire abnormally after traumas and or surgeries.     Assessment:  Pt was in a lot of pain today so focus of session was on reducing her pain and pain control. Focus was also on getting her quad mm to fire normally. Her VMO has a very weak contraction even with Surinamese estim. Hopefully, the mm firing will improve after treatment with Russian estim. Pt was very tender to pressure/palpitation all along her R IT band from her GT down to her knee. Pain also under patella that may benefit from patella lift with taping. Pt will continue to benefit from skilled PT interventions to address current functional deficits and impairments.       Plan: Progress to/Continue with current program. Assess response to estim, STM and taping. Apply tape for patella lift next session.         Manual Therapy:    20     mins  76311;  Therapeutic Exercise:    15     mins  59978;      Neuromuscular Tapan:   25    mins  47518;    Therapeutic Activity:     0     mins  22243;     Electrical Stimulation: __0____ mins 80471  Ultrasound:   0 mins 26254  Work Conditionin mins 68998  Iontophoresis: 0 mins 39038  Timed Treatment:   60   mins   Total Treatment:     70   mins    Molly Cornejo, [facility]  [place] License # [id number]  Physical Therapist Assistant

## 2020-10-26 ENCOUNTER — TREATMENT (OUTPATIENT)
Dept: PHYSICAL THERAPY | Facility: CLINIC | Age: 70
End: 2020-10-26

## 2020-10-26 DIAGNOSIS — R26.9 GAIT DISTURBANCE: ICD-10-CM

## 2020-10-26 DIAGNOSIS — Z96.651 S/P TOTAL KNEE ARTHROPLASTY, RIGHT: Primary | ICD-10-CM

## 2020-10-26 PROCEDURE — 97110 THERAPEUTIC EXERCISES: CPT | Performed by: PHYSICAL THERAPIST

## 2020-10-26 PROCEDURE — 97112 NEUROMUSCULAR REEDUCATION: CPT | Performed by: PHYSICAL THERAPIST

## 2020-10-26 NOTE — PROGRESS NOTES
"Physical Therapy Daily Progress Note        VISIT#: 11      Prema Hoagn reports: Her knee is feeling better today. She is having less pain and her function has improved. She stated the kinesiotape and last session helped.   Current Pain Level:    2/10; Worst:   5/10; Best:  0/10  Location Of Pain: R knee  Response to Previous Session: Good. No issues  Functional Deficits/Irritating Factors: Ambulation, prolonged standing, stairs  Progression: Improving  Compliance with HEP Reported: Yes    Objective   Presents: Ambulating with antalgia  Increased sets/reps of:  none   Increased resistance on:  none  Added to Program: Standing hip abduction, Patella lift with kinesiotape added to tape that was still on from Friday's session.  TKE with ball on wall      See Exercise, Manual, and Modality Logs for complete treatment.     Patient Education: Pt was educated on exercise biomechanical correctness, intensity, and speed.     Assessment:  Pt continues with extension lag. Her knee will \"snap\" into extension instead of having a controlled movement into extension.Reverse walking was stopped because pt could not control her extension with smooth motion. She also does this when she tries to get full extension performing stairs. She continues to need to work on quad eccentric control and quad strength to be able to get into full extension against gravity and without a lag.   Pt will continue to benefit from skilled PT interventions to address current functional deficits and impairments.       Plan: Progress to/Continue with current program. Work on quad control.         Manual Therapy:    0     mins  57254;  Therapeutic Exercise:    40    mins  58376;     Neuromuscular Tapan:    15    mins  33305;    Therapeutic Activity:     0     mins  83149;     Electrical Stimulation: __0____ mins 20770  Ultrasound:   0 mins 00535  Work Conditionin mins 92180  Iontophoresis: 0 mins 16187  Timed Treatment:   55   mins   Total Treatment:     " 55   mins    Molly Cornejo, [facility]  [place] License # [id number]  Physical Therapist Assistant

## 2020-10-28 ENCOUNTER — TREATMENT (OUTPATIENT)
Dept: PHYSICAL THERAPY | Facility: CLINIC | Age: 70
End: 2020-10-28

## 2020-10-28 DIAGNOSIS — R26.9 GAIT DISTURBANCE: ICD-10-CM

## 2020-10-28 DIAGNOSIS — Z96.651 S/P TOTAL KNEE ARTHROPLASTY, RIGHT: Primary | ICD-10-CM

## 2020-10-28 PROCEDURE — 97112 NEUROMUSCULAR REEDUCATION: CPT | Performed by: PHYSICAL THERAPIST

## 2020-10-28 PROCEDURE — 97110 THERAPEUTIC EXERCISES: CPT | Performed by: PHYSICAL THERAPIST

## 2020-10-28 PROCEDURE — 97140 MANUAL THERAPY 1/> REGIONS: CPT | Performed by: PHYSICAL THERAPIST

## 2020-10-28 NOTE — PROGRESS NOTES
"Physical Therapy Daily Progress Note        VISIT#: 12      Prema Hoang reports: Her IT band area is not as sore as it was. She is still huring around her knee, medial and lateral but it is better.   Current Pain Level:    2-3/10; Worst:   5/10; Best:  0/10  Location Of Pain: R knee, medial and lateral  Response to Previous Session: Good. No issues  Functional Deficits/Irritating Factors: Prolonged activity, standing, walking, stairs  Progression: improving  Compliance with HEP Reported: Yes    Objective   Presents: Ambulating with antalgia  Increased sets/reps of:  none   Increased resistance on:  none  Added to Program: HS stretch, IT band stretch, Quad Sets        See Exercise, Manual, and Modality Logs for complete treatment.     Patient Education: Pt was educated on exercise biomechanical correctness, intensity, and speed.     Assessment:  Patient's knee continues to \"snap\" back when she goes into terminal knee extension but appears to have improved a little. She is experiencing less pain today. Her IT band is less tender. As the IT band, HS and medial knee pain resolve, along with the Russian Stem, her quad activation should improve.  Pt will continue to benefit from skilled PT interventions to address current functional deficits and impairments.       Plan: Progress to/Continue with current program. Continue to work on IT band tightness and pain, HS stretching, STM and Russian to increase quad activation and eccentric control.         Manual Therapy:    15     mins  63711;  Therapeutic Exercise:    25     mins  14429;     Neuromuscular Tapan:    15    mins  94854;    Therapeutic Activity:     0     mins  11587;     Electrical Stimulation: __0____ mins 22800  Ultrasound:   0 mins 59224  Work Conditionin mins 29653  Iontophoresis: 0 mins 50076  Timed Treatment:    55  mins   Total Treatment:     55   mins    ADRYAN Doherty License # [id number]  Physical Therapist Assistant      "

## 2020-11-02 ENCOUNTER — CLINICAL SUPPORT (OUTPATIENT)
Dept: ORTHOPEDIC SURGERY | Facility: CLINIC | Age: 70
End: 2020-11-02

## 2020-11-02 VITALS — HEIGHT: 65 IN | WEIGHT: 150 LBS | TEMPERATURE: 98.2 F | BODY MASS INDEX: 24.99 KG/M2

## 2020-11-02 DIAGNOSIS — M17.0 BILATERAL PRIMARY OSTEOARTHRITIS OF KNEE: ICD-10-CM

## 2020-11-02 PROCEDURE — 20610 DRAIN/INJ JOINT/BURSA W/O US: CPT | Performed by: PHYSICIAN ASSISTANT

## 2020-11-02 RX ORDER — METHYLPREDNISOLONE ACETATE 80 MG/ML
160 INJECTION, SUSPENSION INTRA-ARTICULAR; INTRALESIONAL; INTRAMUSCULAR; SOFT TISSUE
Status: COMPLETED | OUTPATIENT
Start: 2020-11-02 | End: 2020-11-02

## 2020-11-02 RX ADMIN — METHYLPREDNISOLONE ACETATE 160 MG: 80 INJECTION, SUSPENSION INTRA-ARTICULAR; INTRALESIONAL; INTRAMUSCULAR; SOFT TISSUE at 12:57

## 2020-11-02 NOTE — PROGRESS NOTES
INJECTION      Patient: Prema Hoang    MRN: [id number]    YOB: 1950    Chief Complaint   Patient presents with   • Right Shoulder - Follow-up, Pain   • Left Shoulder - Follow-up, Pain   • Injections         History of Present Illness:  Prema Hoang is here today for injection therapy. She receives BILATERAL shoulder injections of steroid. Since last injection, patient notes moderate relief of symptoms. Treatment options have been discussed and she understands and consents.       Physical Exam:   70 y.o. female awake, alert, oriented, in no acute distress and well developed, well nourished.  BILATERAL shoulder  Rotator cuff function is moderately impaired.   Positive for a high riding humeral head.  Positive for tenderness with palpation of AC joint.   Range of motion: forward flexion 0-100 degrees, active abduction 0-100 degrees.   Positive for pain and limitation with external rotation against resistance.   Skin and soft tissues are within normal limits.  The pain level is 8.  Axillary nerve function is well preserved.   Neurovascular status is intact.      Procedures  See separate procedure note  Injection site was identified by physical examination and cleaned with Betadine and alcohol swabs. Prior to needle insertion, ethyl chloride spray was used for surface anesthesia. Sterile technique was used.       ASSESSMENT:  Diagnoses and all orders for this visit:    1. Bilateral primary osteoarthritis of knee  -     Visco Treatment  -     Large Joint Arthrocentesis: R glenohumeral  -     Large Joint Arthrocentesis: L glenohumeral          PLAN:   • Bilateral shoulder steroid injection was discussed with the patient. Discussed indication, risks, benefits, and alternatives. Verbal consent was given to proceed with the procedure. Patient tolerated the procedure well and no complications were encountered.  • Discussion of orthopedic goals and activities and patient/guardian expressed  understanding.  • Ice, heat, rest, compression and elevation of extremity as beneficial  • nsaids and/or tylenol as beneficial  • Instructed to refrain from heavy activity/rest the extremity for the next 24-48 hours  • Discussion regarding possibility of cortisol flare and what to expect if this occurs  • Watch for signs and symptoms of infection  • Call if adverse effect from injection therapy  • Follow up in 3 months      Wei Hand PA-C  Encounter Date: 11/2/2020    Electronically signed by Wei Hand PA-C, 11/02/20, 1:15 PM EST.      EMR Dragon/Transcription disclaimer:  Much of this encounter note is an electronic transcription/translation of spoken language to printed text. The electronic translation of spoken language may permit erroneous, or at times, nonsensical words or phrases to be inadvertently transcribed; Although I have reviewed the note for such errors, some may still exist.

## 2020-11-02 NOTE — PROGRESS NOTES
Procedure   Large Joint Arthrocentesis: R glenohumeral  Date/Time: 11/2/2020 12:57 PM  Consent given by: patient  Site marked: site marked  Timeout: Immediately prior to procedure a time out was called to verify the correct patient, procedure, equipment, support staff and site/side marked as required   Supporting Documentation  Indications: pain and joint swelling   Procedure Details  Location: shoulder - R glenohumeral  Preparation: Patient was prepped and draped in the usual sterile fashion  Needle size: 25 G  Approach: anteromedial  Medications administered: 2 mL lidocaine (cardiac); 160 mg methylPREDNISolone acetate 80 MG/ML  Patient tolerance: patient tolerated the procedure well with no immediate complications    Large Joint Arthrocentesis: L glenohumeral  Date/Time: 11/2/2020 12:57 PM  Consent given by: patient  Site marked: site marked  Timeout: Immediately prior to procedure a time out was called to verify the correct patient, procedure, equipment, support staff and site/side marked as required   Supporting Documentation  Indications: pain and joint swelling   Procedure Details  Location: shoulder - L glenohumeral  Preparation: Patient was prepped and draped in the usual sterile fashion  Needle size: 25 G  Approach: superior  Medications administered: 2 mL lidocaine (cardiac); 160 mg methylPREDNISolone acetate 80 MG/ML  Patient tolerance: patient tolerated the procedure well with no immediate complications      Electronically signed by Wei Hand PA-C, 11/02/20, 1:15 PM EST.

## 2020-11-09 ENCOUNTER — TREATMENT (OUTPATIENT)
Dept: PHYSICAL THERAPY | Facility: CLINIC | Age: 70
End: 2020-11-09

## 2020-11-09 DIAGNOSIS — R26.9 GAIT DISTURBANCE: ICD-10-CM

## 2020-11-09 DIAGNOSIS — Z96.651 S/P TOTAL KNEE ARTHROPLASTY, RIGHT: Primary | ICD-10-CM

## 2020-11-09 PROCEDURE — 97110 THERAPEUTIC EXERCISES: CPT | Performed by: PHYSICAL THERAPIST

## 2020-11-09 PROCEDURE — 97112 NEUROMUSCULAR REEDUCATION: CPT | Performed by: PHYSICAL THERAPIST

## 2020-11-09 PROCEDURE — 97140 MANUAL THERAPY 1/> REGIONS: CPT | Performed by: PHYSICAL THERAPIST

## 2020-11-09 NOTE — PROGRESS NOTES
Physical Therapy Daily Progress Note    VISIT#: 13    Subjective   Prema Hoang reports: that she is having only little pain in the knee.  Notes that she had to cancel last weeks appointments as she fell and hurt her back.      Objective   Presents with fluid gait pattern    AROM - 3 - 143    See Exercise, Manual, and Modality Logs for complete treatment.     Patient Education: cont HEP    Assessment/Plan  Patient doing very well.  Able to do step up activities today on a higher step without pain today.  Good strength and motion.  To see MD after next session.    Anticipate DC next Visit           Manual Therapy:    12     mins  87950;  Therapeutic Exercise:    30     mins  61808;     Neuromuscular Tapan:    12    mins  80674;    Therapeutic Activity:     0     mins  42697;     Dry Needling                  0_  mins    Timed Treatment:   54   mins   Total Treatment:     60   mins    Cecelia Parker, PT  [place] License # [id number]  Physical Therapist

## 2020-11-10 ENCOUNTER — TREATMENT (OUTPATIENT)
Dept: PHYSICAL THERAPY | Facility: CLINIC | Age: 70
End: 2020-11-10

## 2020-11-10 DIAGNOSIS — R26.9 GAIT DISTURBANCE: ICD-10-CM

## 2020-11-10 DIAGNOSIS — Z96.651 S/P TOTAL KNEE ARTHROPLASTY, RIGHT: Primary | ICD-10-CM

## 2020-11-10 PROCEDURE — 97530 THERAPEUTIC ACTIVITIES: CPT | Performed by: PHYSICAL THERAPIST

## 2020-11-10 PROCEDURE — 97112 NEUROMUSCULAR REEDUCATION: CPT | Performed by: PHYSICAL THERAPIST

## 2020-11-10 PROCEDURE — 97110 THERAPEUTIC EXERCISES: CPT | Performed by: PHYSICAL THERAPIST

## 2020-11-10 PROCEDURE — 97140 MANUAL THERAPY 1/> REGIONS: CPT | Performed by: PHYSICAL THERAPIST

## 2020-11-10 NOTE — PROGRESS NOTES
MD Letter - Reassessment/Discharge  Patient: Prema Hoang   : 1950    Date of Initial Visit: Type: THERAPY  Noted: 2020  Today's Date: 11/10/2020  Patient seen for 14 sessions    Treatment has included: therapeutic exercise, neuromuscular re-education, manual therapy, therapeutic activity, electrical stimulation and cryotherapy    Subjective   Renetta states that she feels much better than she did a few weeks ago.  She states that she still has some tenderness circa the knee but no true pain.  She denies any true joint pain and has no sense of instability in the knee.  She states that she is still quite careful when doing yard work and paces herself.  She states that she is ready to be discharged from therapy.    Objective - she presents walking with a normal gait pattern and has minimal to no swelling in the knee  Knee AROM - 0-144* flexion   Knee Strength - Quads    Hamstrings   Sensation - intact  Palpation - minimal tenderness in the lateral joint line area  Special tests - negative for any signs of  Activity tolerances - she is able to walk for a couple of miles on level surfaces without pain.  She has actually gone hiking in the woods successfully.  She is able to climb up/down steps with a reciprocal fashion but sometimes does them backwards coming down due to her non operative knee pain.     Assessment/Plan  Patient has demonstrated significant improvement since the initiation of therapy.  The pain has nearly resolved.  The motion has increased to full.  The activity tolerances have increased to desired levels.  I feel that the patient would benefit from continuing her HEP.  If you have any questions concerning the care, please do not hesitate to contact me.          PT Signature: Cecelia Parker, PT        Manual Therapy:    5     mins  82827;  Therapeutic Exercise:    25     mins  86038;     Neuromuscular Tapan:    15    mins  63864;    Therapeutic Activity:     10     mins  90864;   Discussed  continuatiuon of home exercises and progression of her program upon return to the gym    Timed Treatment:   55   mins   Total Treatment:     60   mins

## 2020-11-11 DIAGNOSIS — Z96.651 STATUS POST TOTAL RIGHT KNEE REPLACEMENT: ICD-10-CM

## 2020-11-11 DIAGNOSIS — M17.0 BILATERAL PRIMARY OSTEOARTHRITIS OF KNEE: Primary | ICD-10-CM

## 2020-11-12 ENCOUNTER — HOSPITAL ENCOUNTER (OUTPATIENT)
Dept: OTHER | Facility: HOSPITAL | Age: 70
Discharge: HOME | End: 2020-11-12
Attending: ORTHOPAEDIC SURGERY

## 2020-11-12 ENCOUNTER — OFFICE VISIT (OUTPATIENT)
Dept: ORTHOPEDIC SURGERY | Facility: CLINIC | Age: 70
End: 2020-11-12

## 2020-11-12 VITALS — WEIGHT: 148 LBS | TEMPERATURE: 97 F | HEIGHT: 65 IN | BODY MASS INDEX: 24.66 KG/M2

## 2020-11-12 DIAGNOSIS — Z96.651 STATUS POST TOTAL KNEE REPLACEMENT, RIGHT: Primary | ICD-10-CM

## 2020-11-12 PROCEDURE — 99024 POSTOP FOLLOW-UP VISIT: CPT | Performed by: ORTHOPAEDIC SURGERY

## 2020-11-12 NOTE — PROGRESS NOTES
POST OP TOTAL GLOBAL      NAME: Prema Hoang           : 1950    MRN: [id number]    Chief Complaint   Patient presents with   • Right Knee - Follow-up, Post-op Knee       Date of Surgery: 2020 when she had a right knee arthroplasty.  ?  HPI:   Patient returns today for 2.5 month follow up of right total knee arthroplasty Incision(s) healed nicely with no signs of infection. Patient reports doing well with no unusual complaints. No fevers, rigors or chills. Appears to be progressing appropriately. Patient is on appropriate anticoagulation.  She is doing amazingly well postoperatively.  Her range of motion has progressively improved with physical therapy.  She does not take any narcotic medication for pain control.  She is very pleased with her right knee replacement outcome.  She states that her left knee is also doing quite well because she is not bearing as much weight on the left side as she was prior to the surgical intervention.      Review of Systems:      Ortho Exam:   Right knee.The patient is status post total knee arthroplasty postoperative 2.5 month(s). Incision is clean. Calf is soft and nontender. Homans sign is negative. There is no clicking, popping or catching. Anterior and posterior drawer signs are negative.  There is no instability of the components. Appropriate amounts of swelling and bruising are noted. Dorsalis pedis and posterior tibial artery pulses are palpable. Common peroneal nerve function is well preserved. Range of motion is from 0-140 degrees of flexion. Gait is cautious but otherwise fairly normal. There is no evidence of a deep seated joint infection.      Diagnostic Studies:  no diagnostic testing performed this visit        Assessment:  Diagnoses and all orders for this visit:    1. Status post total knee replacement, right (Primary)            Plan   · Incision care  · To use ACE wrap/RENARD stocking  · Continue ice to joint   · Stretching and strengthening  exercises  · Aggressive ROM  · Falls precautions  · You may now resume any prescription medications you were taking prior to surgery  · Continue with lifelong antibiotic prophylaxis with dental procedures following total joint replacement.  · Follow up in 6 month(s)    Date of encounter: 11/12/2020   Dean Trinidad MD

## 2020-11-24 ENCOUNTER — LAB REQUISITION (OUTPATIENT)
Dept: LAB | Facility: HOSPITAL | Age: 70
End: 2020-11-24

## 2020-11-24 DIAGNOSIS — Z00.00 ENCOUNTER FOR GENERAL ADULT MEDICAL EXAMINATION WITHOUT ABNORMAL FINDINGS: ICD-10-CM

## 2020-11-24 PROCEDURE — U0004 COV-19 TEST NON-CDC HGH THRU: HCPCS | Performed by: INTERNAL MEDICINE

## 2020-11-25 LAB — SARS-COV-2 RNA RESP QL NAA+PROBE: NOT DETECTED

## 2020-11-30 ENCOUNTER — OFFICE (OUTPATIENT)
Dept: URBAN - METROPOLITAN AREA PATHOLOGY 4 | Facility: PATHOLOGY | Age: 70
End: 2020-11-30
Payer: MEDICARE

## 2020-11-30 ENCOUNTER — AMBULATORY SURGICAL CENTER (OUTPATIENT)
Dept: URBAN - METROPOLITAN AREA SURGERY 20 | Facility: SURGERY | Age: 70
End: 2020-11-30

## 2020-11-30 DIAGNOSIS — Z80.0 FAMILY HISTORY OF MALIGNANT NEOPLASM OF DIGESTIVE ORGANS: ICD-10-CM

## 2020-11-30 DIAGNOSIS — K31.89 OTHER DISEASES OF STOMACH AND DUODENUM: ICD-10-CM

## 2020-11-30 DIAGNOSIS — K57.30 DIVERTICULOSIS OF LARGE INTESTINE WITHOUT PERFORATION OR ABS: ICD-10-CM

## 2020-11-30 DIAGNOSIS — K64.0 FIRST DEGREE HEMORRHOIDS: ICD-10-CM

## 2020-11-30 DIAGNOSIS — K21.9 GASTRO-ESOPHAGEAL REFLUX DISEASE WITHOUT ESOPHAGITIS: ICD-10-CM

## 2020-11-30 DIAGNOSIS — D12.8 BENIGN NEOPLASM OF RECTUM: ICD-10-CM

## 2020-11-30 DIAGNOSIS — K62.1 RECTAL POLYP: ICD-10-CM

## 2020-11-30 PROBLEM — Z80.9 FAMILY HISTORY OF COLON CANCER: Status: ACTIVE | Noted: 2020-11-30

## 2020-11-30 LAB
GI HISTOLOGY: A. SELECT: (no result)
GI HISTOLOGY: B. SELECT: (no result)
GI HISTOLOGY: PDF REPORT: (no result)

## 2020-11-30 PROCEDURE — 88305 TISSUE EXAM BY PATHOLOGIST: CPT | Performed by: INTERNAL MEDICINE

## 2020-11-30 PROCEDURE — 45380 COLONOSCOPY AND BIOPSY: CPT | Mod: PT | Performed by: INTERNAL MEDICINE

## 2020-11-30 PROCEDURE — 88305 TISSUE EXAM BY PATHOLOGIST: CPT | Mod: 26,TC | Performed by: INTERNAL MEDICINE

## 2020-11-30 PROCEDURE — 43239 EGD BIOPSY SINGLE/MULTIPLE: CPT | Performed by: INTERNAL MEDICINE

## 2020-12-18 ENCOUNTER — OFFICE VISIT (OUTPATIENT)
Dept: INTERNAL MEDICINE | Facility: CLINIC | Age: 70
End: 2020-12-18

## 2020-12-18 VITALS
OXYGEN SATURATION: 99 % | HEART RATE: 80 BPM | SYSTOLIC BLOOD PRESSURE: 120 MMHG | TEMPERATURE: 96.9 F | RESPIRATION RATE: 16 BRPM | HEIGHT: 65 IN | WEIGHT: 150 LBS | DIASTOLIC BLOOD PRESSURE: 78 MMHG | BODY MASS INDEX: 24.99 KG/M2

## 2020-12-18 DIAGNOSIS — E55.9 VITAMIN D DEFICIENCY DISEASE: ICD-10-CM

## 2020-12-18 DIAGNOSIS — Z86.39 HISTORY OF VITAMIN D DEFICIENCY: ICD-10-CM

## 2020-12-18 DIAGNOSIS — M17.0 BILATERAL PRIMARY OSTEOARTHRITIS OF KNEE: ICD-10-CM

## 2020-12-18 DIAGNOSIS — E78.2 MIXED HYPERLIPIDEMIA: Primary | ICD-10-CM

## 2020-12-18 DIAGNOSIS — Z00.00 ROUTINE GENERAL MEDICAL EXAMINATION AT A HEALTH CARE FACILITY: ICD-10-CM

## 2020-12-18 LAB
BILIRUB BLD-MCNC: NEGATIVE MG/DL
CLARITY, POC: CLEAR
COLOR UR: YELLOW
GLUCOSE UR STRIP-MCNC: NEGATIVE MG/DL
KETONES UR QL: NEGATIVE
LEUKOCYTE EST, POC: NEGATIVE
NITRITE UR-MCNC: NEGATIVE MG/ML
PH UR: 6 [PH] (ref 5–8)
PROT UR STRIP-MCNC: NEGATIVE MG/DL
RBC # UR STRIP: NEGATIVE /UL
SP GR UR: 1.02 (ref 1–1.03)
UROBILINOGEN UR QL: NORMAL

## 2020-12-18 PROCEDURE — 81003 URINALYSIS AUTO W/O SCOPE: CPT | Performed by: INTERNAL MEDICINE

## 2020-12-18 PROCEDURE — G0439 PPPS, SUBSEQ VISIT: HCPCS | Performed by: INTERNAL MEDICINE

## 2020-12-18 RX ORDER — ROSUVASTATIN CALCIUM 5 MG/1
TABLET, COATED ORAL
COMMUNITY
Start: 2020-12-07 | End: 2020-12-18

## 2020-12-18 NOTE — PROGRESS NOTES
The ABCs of the Annual Wellness Visit  Subsequent Medicare Wellness Visit    Chief Complaint   Patient presents with   • Medicare Wellness-subsequent       Subjective   History of Present Illness:  Prema Hoang is a 70 y.o. female who presents for a Subsequent Medicare Wellness Visit.  Overall she is doing great.  She is about 2 weeks postop knee replacement and doing very well with that.    HEALTH RISK ASSESSMENT    Recent Hospitalizations:  Recently treated at the following:  Other: [facility]    Current Medical Providers:  Patient Care Team:  Tony Joel MD (Tony) as PCP - General (Internal Medicine)    Smoking Status:  Social History     Tobacco Use   Smoking Status Former Smoker   • Types: Cigarettes   • Quit date:    • Years since quittin.9   Smokeless Tobacco Never Used   Tobacco Comment    SOCIAL, MAYBE 1 PACK PER WEEK PRACTICE FUSION SAYS NO SMOKING       Alcohol Consumption:  Social History     Substance and Sexual Activity   Alcohol Use Yes   • Alcohol/week: 7.0 standard drinks   • Types: 7 Glasses of wine per week    Comment: 7 days per week       Depression Screen:   PHQ-2/PHQ-9 Depression Screening 2020   Little interest or pleasure in doing things 0   Feeling down, depressed, or hopeless 0   Trouble falling or staying asleep, or sleeping too much 1   Feeling tired or having little energy 0   Poor appetite or overeating 0   Feeling bad about yourself - or that you are a failure or have let yourself or your family down 0   Trouble concentrating on things, such as reading the newspaper or watching television 0   Moving or speaking so slowly that other people could have noticed. Or the opposite - being so fidgety or restless that you have been moving around a lot more than usual 0   Thoughts that you would be better off dead, or of hurting yourself in some way 0   Total Score 1       Fall Risk Screen:  STEADI Fall Risk Assessment has not been completed.    Health Habits and  Functional and Cognitive Screening:  Functional & Cognitive Status 12/18/2020   Do you have difficulty preparing food and eating? No   Do you have difficulty bathing yourself, getting dressed or grooming yourself? No   Do you have difficulty using the toilet? No   Do you have difficulty moving around from place to place? No   Do you have trouble with steps or getting out of a bed or a chair? No   Current Diet Well Balanced Diet   Dental Exam Up to date   Eye Exam Up to date   Exercise (times per week) 3 times per week   Current Exercises Include Walking   Do you need help using the phone?  No   Are you deaf or do you have serious difficulty hearing?  No   Do you need help with transportation? No   Do you need help shopping? No   Do you need help preparing meals?  No   Do you need help with housework?  No   Do you need help with laundry? No   Do you need help taking your medications? No   Do you need help managing money? No   Do you ever drive or ride in a car without wearing a seat belt? No   Have you felt unusual stress, anger or loneliness in the last month? No   Who do you live with? Spouse   If you need help, do you have trouble finding someone available to you? No   Have you been bothered in the last four weeks by sexual problems? No   Do you have difficulty concentrating, remembering or making decisions? No         Does the patient have evidence of cognitive impairment? No    Asprin use counseling:Does not need ASA (and currently is not on it)    Age-appropriate Screening Schedule:  Refer to the list below for future screening recommendations based on patient's age, sex and/or medical conditions. Orders for these recommended tests are listed in the plan section. The patient has been provided with a written plan.    Health Maintenance   Topic Date Due   • URINE MICROALBUMIN  1950   • DIABETIC FOOT EXAM  07/15/2016   • ZOSTER VACCINE (2 of 2) 01/16/2019   • HEMOGLOBIN A1C  09/12/2020   • LIPID PANEL   03/12/2021   • DIABETIC EYE EXAM  08/25/2021   • DXA SCAN  05/08/2023   • COLONOSCOPY  08/13/2025   • TDAP/TD VACCINES (3 - Td) 12/23/2026   • INFLUENZA VACCINE  Completed   • MAMMOGRAM  Discontinued          The following portions of the patient's history were reviewed and updated as appropriate: allergies, current medications, past family history, past medical history, past social history, past surgical history and problem list.    Outpatient Medications Prior to Visit   Medication Sig Dispense Refill   • B Complex Vitamins (B-COMPLEX/B-12) tablet Take 1 tablet by mouth Daily.     • Calcium Carb-Cholecalciferol (CALCIUM 600+D3) 600-200 MG-UNIT tablet Take 1 tablet by mouth Daily.     • cetirizine (zyrTEC) 10 MG tablet Take 10 mg by mouth Daily.     • Cholecalciferol (VITAMIN D3) 2000 UNITS capsule Take 2,000 Units by mouth daily.     • esomeprazole (NEXIUM) 40 MG capsule Take 40 mg by mouth Every Morning Before Breakfast.     • famciclovir (FAMVIR) 500 MG tablet TAKE 3 TABLETS BY MOUTH ONE TIME AS NEEDED FOR HSV OUTBREAK  3   • meloxicam (MOBIC) 15 MG tablet Take 1 tablet by mouth Daily. Resume after Completion of Xarelto RX  0   • metoprolol tartrate (LOPRESSOR) 25 MG tablet Take 25 mg by mouth 2 (Two) Times a Day.  0   • Multiple Vitamins-Minerals (CENTRUM SILVER 50+WOMEN PO) Take 1 tablet by mouth Daily.     • Probiotic Product (ALIGN PO) Take 1 tablet by mouth Daily.     • rosuvastatin (CRESTOR) 10 MG tablet Take 10 mg by mouth Daily.  4   • Turmeric 500 MG capsule Take 1 tablet by mouth Daily.     • cyclobenzaprine (FLEXERIL) 10 MG tablet TAKE 1 TABLET BY MOUTH EVERY NIGHT AT BEDTIME 30 tablet 0   • docusate sodium 100 MG capsule Take 100 mg by mouth 2 (Two) Times a Day. 60 each 0   • HYDROcodone-acetaminophen (NORCO) 5-325 MG per tablet Take 1-2 tabs po q 4 hours prn pain 50 tablet 0   • ondansetron (ZOFRAN) 4 MG tablet Take 1 tablet by mouth Every 6 (Six) Hours As Needed for Nausea or Vomiting. 20 tablet 0    • rivaroxaban (XARELTO) 10 MG tablet Take 1 tablet by mouth Daily With Dinner. Indications: Prevention of Unwanted Clot in Veins, Prophylaxis of Venous Thromboembolism 14 tablet 0   • rosuvastatin (CRESTOR) 5 MG tablet      • zoledronic acid (RECLAST) 5 MG/100ML solution infusion Infuse 5 mg into a venous catheter 1 (One) Time.       Facility-Administered Medications Prior to Visit   Medication Dose Route Frequency Provider Last Rate Last Admin   • Chlorhexidine Gluconate 2 % pads 1 each  1 each Apply externally Take As Directed Dean Trinidad MD           Patient Active Problem List   Diagnosis   • Right upper quadrant pain   • Abnormal liver enzymes   • Abnormal magnetic resonance imaging study   • Arthritis   • Hereditary essential tremor   • Gastroesophageal reflux disease without esophagitis   • Headache   • Hyperlipidemia   • Impaired fasting glucose   • Osteoarthritis   • Osteoporosis   • Nonalcoholic steatohepatitis (BYRD)   • Chronic pain of both shoulders   • Bilateral chronic knee pain   • Rotator cuff tear arthropathy of both shoulders   • Chronic back pain   • Bilateral shoulder pain   • Bilateral primary osteoarthritis of knee   • Nontraumatic subluxation of extensor tendon at metacarpophalangeal joint of right hand   • Polyarthralgia   • AC (acromioclavicular) arthritis   • Acquired absence of genital organ   • Asymptomatic postmenopausal status   • Disorder of bone and cartilage   • H/O bilateral mastectomy   • Hx antineoplastic chemotherapy   • Hx of fusion of cervical spine   • Other postprocedural status(V45.89)   • Personal history of malignant neoplasm of breast   • Prophylactic use of aromatase inhibitors   • Post-operative state   • Sebaceous cyst   • Shoulder impingement   • Trigger ring finger of right hand   • Rotator cuff arthropathy of both shoulders   • Chronic pain of both knees   • Primary osteoarthritis of right knee   • Vomiting   • Abnormal EKG   • Senile osteoporosis   • Status  "post total right knee replacement   • Primary osteoarthritis of left knee   • Essential (primary) hypertension       Advanced Care Planning:  ACP discussion was held with the patient during this visit. Patient does not have an advance directive, information provided.    Review of Systems   Constitutional: Negative for activity change, appetite change and fatigue.   Eyes: Negative for photophobia and visual disturbance.   Respiratory: Negative for chest tightness and shortness of breath.    Cardiovascular: Negative for chest pain, palpitations and leg swelling.   Gastrointestinal: Negative for abdominal distention, abdominal pain, blood in stool, nausea and vomiting.        Does have some occasional right upper quadrant pain.  Not postprandial in nature   Endocrine: Negative for polydipsia and polyuria.   Genitourinary: Negative for difficulty urinating and dysuria.   Musculoskeletal: Negative for arthralgias and myalgias.   Skin: Negative for rash.   Allergic/Immunologic: Negative for immunocompromised state.   Neurological: Negative for syncope, numbness and headaches.   Psychiatric/Behavioral: Negative for agitation, behavioral problems and dysphoric mood.       Compared to one year ago, the patient feels her physical health is the same.  Compared to one year ago, the patient feels her mental health is the same.    Reviewed chart for potential of high risk medication in the elderly: yes  Reviewed chart for potential of harmful drug interactions in the elderly:yes    Objective         Vitals:    12/18/20 0924   BP: 120/78   BP Location: Right arm   Patient Position: Sitting   Cuff Size: Large Adult   Pulse: 80   Resp: 16   Temp: 96.9 °F (36.1 °C)   TempSrc: Temporal   SpO2: 99%   Weight: 68 kg (150 lb)   Height: 165.1 cm (65\")       Body mass index is 24.96 kg/m².  Discussed the patient's BMI with her. The BMI is in the acceptable range.    Physical Exam  Vitals signs and nursing note reviewed.   Constitutional:  "      General: She is not in acute distress.     Appearance: Normal appearance. She is not ill-appearing, toxic-appearing or diaphoretic.   HENT:      Head: Normocephalic and atraumatic.      Nose: Nose normal.      Mouth/Throat:      Mouth: Mucous membranes are moist.      Pharynx: Oropharynx is clear.   Eyes:      Conjunctiva/sclera: Conjunctivae normal.      Pupils: Pupils are equal, round, and reactive to light.   Neck:      Musculoskeletal: Neck supple.   Cardiovascular:      Rate and Rhythm: Normal rate and regular rhythm.      Pulses: Normal pulses.      Heart sounds: Normal heart sounds. No murmur. No friction rub. No gallop.    Pulmonary:      Effort: Pulmonary effort is normal.      Breath sounds: Normal breath sounds. No stridor. No wheezing, rhonchi or rales.   Abdominal:      General: Abdomen is flat. Bowel sounds are normal.      Palpations: Abdomen is soft. There is no mass.      Tenderness: There is no abdominal tenderness. There is no guarding or rebound.   Skin:     General: Skin is warm and dry.      Capillary Refill: Capillary refill takes 2 to 3 seconds.      Comments: Diabetic Foot Exam Performed   Neurological:      General: No focal deficit present.      Mental Status: She is alert and oriented to person, place, and time.   Psychiatric:         Mood and Affect: Mood normal.         Thought Content: Thought content normal.               Assessment/Plan   Medicare Risks and Personalized Health Plan  CMS Preventative Services Quick Reference  Cardiovascular risk  Colon Cancer Screening  Diabetic Lab Screening     The above risks/problems have been discussed with the patient.  Pertinent information has been shared with the patient in the After Visit Summary.  Follow up plans and orders are seen below in the Assessment/Plan Section.    Diagnoses and all orders for this visit:    1. Mixed hyperlipidemia (Primary)  -     CBC & Differential  -     Comprehensive Metabolic Panel  -     Lipid Panel With  / Chol / HDL Ratio  -     TSH  -     Hemoglobin A1c  -     Vitamin D 25 Hydroxy  -     Hepatitis C Antibody  -     POC Urinalysis Dipstick, Automated    2. Bilateral primary osteoarthritis of knee    3. Routine general medical examination at a health care facility  -     CBC & Differential  -     Comprehensive Metabolic Panel  -     Lipid Panel With / Chol / HDL Ratio  -     TSH  -     Hemoglobin A1c  -     Vitamin D 25 Hydroxy  -     Hepatitis C Antibody  -     POC Urinalysis Dipstick, Automated    4. History of vitamin D deficiency   -     Hemoglobin A1c    5. Vitamin D deficiency disease   -     Vitamin D 25 Hydroxy      Follow Up:  No follow-ups on file.     An After Visit Summary and PPPS were given to the patient.       Overall she is doing well.  She was admitted for knee replacement in October and has done great with postoperative course.  Still doing her therapy.  She did mention some right upper quadrant pain, not necessarily associated with meals.  She does have known gallstones.  We talked about that.  She is leaving for [place] Sunday, if develops any right upper quadrant discomfort that persist certainly needs to be evaluated.

## 2020-12-19 LAB
25(OH)D3+25(OH)D2 SERPL-MCNC: 48.4 NG/ML (ref 30–100)
ALBUMIN SERPL-MCNC: 4.4 G/DL (ref 3.5–5.2)
ALBUMIN/GLOB SERPL: 1.6 G/DL
ALP SERPL-CCNC: 95 U/L (ref 39–117)
ALT SERPL-CCNC: 45 U/L (ref 1–33)
AST SERPL-CCNC: 33 U/L (ref 1–32)
BASOPHILS # BLD AUTO: NORMAL 10*3/UL
BASOPHILS # BLD MANUAL: 0.08 10*3/MM3 (ref 0–0.2)
BASOPHILS NFR BLD MANUAL: 1 % (ref 0–1.5)
BILIRUB SERPL-MCNC: 0.4 MG/DL (ref 0–1.2)
BUN SERPL-MCNC: 25 MG/DL (ref 8–23)
BUN/CREAT SERPL: 29.1 (ref 7–25)
CALCIUM SERPL-MCNC: 10 MG/DL (ref 8.6–10.5)
CHLORIDE SERPL-SCNC: 102 MMOL/L (ref 98–107)
CHOLEST SERPL-MCNC: 187 MG/DL (ref 0–200)
CHOLEST/HDLC SERPL: 2.13 {RATIO}
CO2 SERPL-SCNC: 28.8 MMOL/L (ref 22–29)
CREAT SERPL-MCNC: 0.86 MG/DL (ref 0.57–1)
DIFFERENTIAL COMMENT: NORMAL
EOSINOPHIL # BLD AUTO: NORMAL 10*3/UL
EOSINOPHIL NFR BLD AUTO: NORMAL %
ERYTHROCYTE [DISTWIDTH] IN BLOOD BY AUTOMATED COUNT: 14.2 % (ref 12.3–15.4)
GLOBULIN SER CALC-MCNC: 2.8 GM/DL
GLUCOSE SERPL-MCNC: 107 MG/DL (ref 65–99)
HBA1C MFR BLD: 6.2 % (ref 4.8–5.6)
HCT VFR BLD AUTO: 43.9 % (ref 34–46.6)
HCV AB S/CO SERPL IA: <0.1 S/CO RATIO (ref 0–0.9)
HDLC SERPL-MCNC: 88 MG/DL (ref 40–60)
HGB BLD-MCNC: 14.9 G/DL (ref 12–15.9)
LDLC SERPL CALC-MCNC: 87 MG/DL (ref 0–100)
LYMPHOCYTES # BLD AUTO: NORMAL 10*3/UL
LYMPHOCYTES # BLD MANUAL: 3 10*3/MM3 (ref 0.7–3.1)
LYMPHOCYTES NFR BLD AUTO: NORMAL %
LYMPHOCYTES NFR BLD MANUAL: 40 % (ref 19.6–45.3)
MCH RBC QN AUTO: 29.7 PG (ref 26.6–33)
MCHC RBC AUTO-ENTMCNC: 33.9 G/DL (ref 31.5–35.7)
MCV RBC AUTO: 87.6 FL (ref 79–97)
MONOCYTES # BLD MANUAL: 0.6 10*3/MM3 (ref 0.1–0.9)
MONOCYTES NFR BLD AUTO: NORMAL %
MONOCYTES NFR BLD MANUAL: 8 % (ref 5–12)
NEUTROPHILS # BLD MANUAL: 3.83 10*3/MM3 (ref 1.7–7)
NEUTROPHILS NFR BLD AUTO: NORMAL %
NEUTROPHILS NFR BLD MANUAL: 51 % (ref 42.7–76)
PLATELET # BLD AUTO: 249 10*3/MM3 (ref 140–450)
PLATELET BLD QL SMEAR: NORMAL
POTASSIUM SERPL-SCNC: 4.6 MMOL/L (ref 3.5–5.2)
PROT SERPL-MCNC: 7.2 G/DL (ref 6–8.5)
RBC # BLD AUTO: 5.01 10*6/MM3 (ref 3.77–5.28)
RBC MORPH BLD: NORMAL
SODIUM SERPL-SCNC: 140 MMOL/L (ref 136–145)
TRIGL SERPL-MCNC: 61 MG/DL (ref 0–150)
TSH SERPL DL<=0.005 MIU/L-ACNC: 1.84 UIU/ML (ref 0.27–4.2)
VLDLC SERPL CALC-MCNC: 12 MG/DL (ref 5–40)
WBC # BLD AUTO: 7.51 10*3/MM3 (ref 3.4–10.8)

## 2020-12-21 ENCOUNTER — TELEPHONE (OUTPATIENT)
Dept: INTERNAL MEDICINE | Facility: CLINIC | Age: 70
End: 2020-12-21

## 2020-12-23 NOTE — TELEPHONE ENCOUNTER
I talked with the patient about her lab work.  She is going to work on dietary modification for her elevated hemoglobin A1c.  Follow-up in 3 to 4 months to repeat lab work and office visit.

## 2021-01-05 RX ORDER — FAMCICLOVIR 500 MG/1
TABLET ORAL
Qty: 21 TABLET | Refills: 3 | OUTPATIENT
Start: 2021-01-05 | End: 2021-12-14

## 2021-01-05 NOTE — TELEPHONE ENCOUNTER
Caller: Prema Hoang    Relationship: Self    Best call back number: [phone]    Medication needed:   Requested Prescriptions     Pending Prescriptions Disp Refills   • famciclovir (FAMVIR) 500 MG tablet   3       When do you need the refill by: 1/5/2021    What details did the patient provide when requesting the medication: PATIENT IN [place] AND FORGOT MEDICATION NEEDS SENT TO PHARMACY IN [place].    Does the patient have less than a 3 day supply:  [x] Yes  [] No    What is the patient's preferred pharmacy: [id number] [place] [address] [facility] [place] [facility] - [phone] [facility] [phone] FX

## 2021-03-09 DIAGNOSIS — Z23 IMMUNIZATION DUE: ICD-10-CM

## 2021-06-02 ENCOUNTER — TREATMENT (OUTPATIENT)
Dept: PHYSICAL THERAPY | Facility: CLINIC | Age: 71
End: 2021-06-02

## 2021-06-02 ENCOUNTER — TRANSCRIBE ORDERS (OUTPATIENT)
Dept: PHYSICAL THERAPY | Facility: CLINIC | Age: 71
End: 2021-06-02

## 2021-06-02 DIAGNOSIS — Z96.642 HISTORY OF TOTAL HIP REPLACEMENT, LEFT: Primary | ICD-10-CM

## 2021-06-02 DIAGNOSIS — Z96.642 STATUS POST LEFT HIP REPLACEMENT: Primary | ICD-10-CM

## 2021-06-02 DIAGNOSIS — M25.559 PAIN, HIP: ICD-10-CM

## 2021-06-02 DIAGNOSIS — Z74.09 IMPAIRED FUNCTIONAL MOBILITY, BALANCE, GAIT, AND ENDURANCE: ICD-10-CM

## 2021-06-02 PROCEDURE — 97110 THERAPEUTIC EXERCISES: CPT | Performed by: PHYSICAL THERAPIST

## 2021-06-02 PROCEDURE — 97530 THERAPEUTIC ACTIVITIES: CPT | Performed by: PHYSICAL THERAPIST

## 2021-06-02 PROCEDURE — 97162 PT EVAL MOD COMPLEX 30 MIN: CPT | Performed by: PHYSICAL THERAPIST

## 2021-06-02 NOTE — PROGRESS NOTES
Physical Therapy Initial Evaluation and Plan of Care    Patient: Prema Hoang   : 1950  Diagnosis/ICD-10 Code:  Status post left hip replacement [Z96.642]  Referring practitioner: Maxwell Ureña DO ([place], [place])    Subjective Evaluation    History of Present Illness  Date of onset: 2021  Date of surgery: 2021  Mechanism of injury: Pt was at her other home in [place] and fell on concrete after tripping on a box and fell onto her L hip and went to ER by ambulance.  Had surgery the next day to replace the femoral head.  Pt was in the hospital for 2-3 days and then onto rehab for 10 days followed by home health until 1 week ago.  Pt returned to [place] via air travel on 21.  Plans to return to [place] 21 for several weeks.      Hip precautions after surgery was to limit abduction.    Needs UE assist to rise from chair/commode and has difficulty getting in/out car.  Pt is able to drive. - sleep disturbance.     Using cane in community; recently got off a walker.     PMH - PMH - R TKA, breast cancer (), osteoporosis, C-S and L-S fusions, HA    Pain  Current pain rating: 3  At worst pain ratin  Location: L lateral hip.    Quality: sharp and dull ache  Aggravating factors: ambulation, standing and stairs  Progression: improved    Social Support  Lives in: one-story house (3 stairs to get in/out)  Lives with: spouse    Patient Goals  Patient goals for therapy: return to sport/leisure activities and independence with ADLs/IADLs         Subjective Questionnaire: LEFS: 27  PLOF: riding a bike, playing pickleball, doing my own housework, walking, golf.  Very active  Medical Hx reviewed      Objective          Observations   Left Hip  Positive for incision (lateral - well healed).     Additional Hip Observation Details  Pt sits with L knee extended (post hip precautions)    Palpation   Left   Tenderness of the piriformis.     Tenderness     Left Hip   Tenderness in the ASIS and greater  "trochanter.     Additional Tenderness Details  Level ASIS    Neurological Testing     Sensation     Hip   Left Hip   Intact: light touch    Active Range of Motion   Left Hip   Flexion: 85 (hip precautions) degrees     Strength/Myotome Testing     Left Hip   Planes of Motion   Flexion: 4  Abduction: 3 (neutral testing)    Left Knee   Flexion: 4+  Extension: 4+    Tests     Lumbar     Left   Negative passive SLR.     Left Hip   SLR: Negative (groin pain).     Ambulation   Weight-Bearing Status   Ambulation assistive devices: small base medical walking stick.    Observational Gait   Gait: antalgic   Decreased left stance time.   Left stance time comments: if attempts without walking cane    Functional Assessment     Comments  5TSTS - 15.21 Sec    TUG - 10.20 sec without AD           Assessment & Plan     Assessment  Impairments: abnormal gait, abnormal or restricted ROM, activity intolerance, impaired physical strength, lacks appropriate home exercise program and pain with function  Assessment details: Prema Hoang is a 71 y.o. female referred to physical therapy s/p L hip replacement (femoral head only per pt).  She presents with decreased hip MMT, groin pain with ASLR, palp tenderness, antalgic gait (improves with use of \"walking\" small base cane), requires UE to rise from a chair.  Pt is very active and hip precautions were reviewed with education on anatomy/tissue healing time and jt protection.  Pt would benefit from skilled PT services in order to address listed impairments and increase tolerance to normal daily activities and recreational activities.         Prognosis: good  Functional Limitations: walking, uncomfortable because of pain, standing and stooping  Goals  Plan Goals: STG In 8 visits  1. Pt to be independent with HEP  2. Pt to ambulate in a good reciprocal gait pattern on TM with verbal cuing  3. Pt is able to sit to stand without UE assist or R lateral shift  4. Pt to exhibit L hip overall strength " to >/= 4/5 with minimal pain to allow for prolonged ADL's and walking  5. Pt is compliant with post op hip precautions    LTG In 16 visits  1. Pt to exhibit at least 100 degrees of L hip flex AROM  (once cleared by surgeon) to allow for traversing objects, getting on/off bike and transitional movements as necessary for ADL's/recreational activities.  2. Pt to report min to no pain with ADLs  3. Pt to score >/= 42/80 on LEFS  4. Pt to exhibit 5/5 strength throughout hip and knee musculature to allow for normalized gait and prolonged standing.  5. 5TSTS < 14 sec without UE assist to demonstrate improved functional strength.  6. Pt ambulates in a good reciprocal gait pattern for community distances without AD       Plan  Therapy options: will be seen for skilled physical therapy services  Planned modality interventions: cryotherapy, electrical stimulation/Russian stimulation and TENS  Planned therapy interventions: home exercise program, therapeutic activities, manual therapy, strengthening, stretching, gait training, neuromuscular re-education and balance/weight-bearing training  Duration in visits: 16  Treatment plan discussed with: patient        Timed:  Manual Therapy:    -     mins  82607;  Therapeutic Exercise:    15     mins  46044;     Neuromuscular Tapan:    -    mins  43672;    Therapeutic Activity:     10     mins  59956;     Gait Training:      -     mins  69786;     Ultrasound:     -     mins  21703;    Iontophoresis                 -     mins 82172    Timed Treatment:   25   mins   Total Treatment:     60   mins    PT SIGNATURE: DYLLAN Nicole License # [id number]  DATE TREATMENT INITIATED: 6/2/2021    Medicare Initial Certification  Certification Period: 8/31/2021  I certify that the therapy services are furnished while this patient is under my care.  The services outlined above are required by this patient, and will be reviewed every 90 days.     PHYSICIAN: Provider, No Known      DATE:     Please  sign and return via fax to [phone]. Thank you, [facility].

## 2021-06-02 NOTE — PATIENT INSTRUCTIONS
Access Code: [email or web address]  URL: [email or web address]  Date: 06/02/2021  Prepared by: Doris Ahumada    Exercises  Seated Isometric Hip Adduction with Ball - 2 x daily - 1 sets - 10 reps - 10 hold  Seated Isometric Hip Abduction with Belt - 2 x daily - 1 sets - 15 reps - 5 hold  Seated Long Arc Quad - 2 x daily - 1 sets - 10 reps - 5 hold  Hooklying Small March - 2 x daily - 10 reps - 1 sets  Supine Posterior Pelvic Tilt - 2 x daily - 1 sets - 10 reps - 5 hold  Hooklying Gluteal Sets - 2 x daily - 2 sets - 10 reps - 5 hold    Patient Education  ESTUARDO Posterior Precautions Handout    Patient was educated on findings of evaluation, purpose of treatment, and goals for therapy.  Treatment options discussed and questions answered.  Patient was educated on exercises/self treatment/pain relief techniques.

## 2021-06-03 ENCOUNTER — TREATMENT (OUTPATIENT)
Dept: PHYSICAL THERAPY | Facility: CLINIC | Age: 71
End: 2021-06-03

## 2021-06-03 DIAGNOSIS — M25.559 PAIN, HIP: ICD-10-CM

## 2021-06-03 DIAGNOSIS — Z96.642 STATUS POST LEFT HIP REPLACEMENT: Primary | ICD-10-CM

## 2021-06-03 DIAGNOSIS — Z74.09 IMPAIRED FUNCTIONAL MOBILITY, BALANCE, GAIT, AND ENDURANCE: ICD-10-CM

## 2021-06-03 PROCEDURE — 97110 THERAPEUTIC EXERCISES: CPT | Performed by: PHYSICAL THERAPIST

## 2021-06-03 PROCEDURE — 97112 NEUROMUSCULAR REEDUCATION: CPT | Performed by: PHYSICAL THERAPIST

## 2021-06-03 NOTE — PROGRESS NOTES
Physical Therapy Daily Progress Note    Patient: Prema Hoang   : 1950  Referring practitioner: No Known Provider  Date of Initial Visit: Type: THERAPY  Noted: 2021  Today's Date: 6/3/2021  Patient seen for 2 sessions  Visit Diagnoses:    ICD-10-CM ICD-9-CM   1. Status post left hip replacement  Z96.642 V43.64   2. Impaired functional mobility, balance, gait, and endurance  Z74.09 V49.89   3. Pain, hip  M25.559 719.45       Subjective   Prema Hoang reports: that she is not sore from her initial visit.  Was able to walk throughout the house without pain.        Objective   Walks with straight cane in left hand - good gait pattern but has increased upper body sway without cane    See Exercise, Manual, and Modality Logs for complete treatment.     Patient Education: work on balance activities     Assessment/Plan  Left knee pain preventing some exercise today. Very little hip pain but definitie weakness in hip mm.  6 weeks post left femoral component hip replacement. Very cooperative with therapy.    Progress strengthening /stabilization /functional activity           Timed:  Manual Therapy:    0     mins  46772;  Therapeutic Exercise:    20/30     mins  96766;     Neuromuscular Tapan:    10    mins  44091;    Therapeutic Activity:     0     mins  13134;     Ultrasound:     0     mins  56227;    Iontophoresis                 0     mins  Dry Needling                  0     mins        Untimed:  Electrical Stimulation:    0     mins  06112 ( );  Mechanical Traction:    0     mins  38427;     Timed Treatment:   30   mins   Total Treatment:     45   mins    Cecelia Parker, PT  [place] License # [id number]  Physical Therapist

## 2021-06-14 ENCOUNTER — TREATMENT (OUTPATIENT)
Dept: PHYSICAL THERAPY | Facility: CLINIC | Age: 71
End: 2021-06-14

## 2021-06-14 DIAGNOSIS — Z96.642 STATUS POST LEFT HIP REPLACEMENT: Primary | ICD-10-CM

## 2021-06-14 DIAGNOSIS — M25.559 PAIN, HIP: ICD-10-CM

## 2021-06-14 DIAGNOSIS — Z74.09 IMPAIRED FUNCTIONAL MOBILITY, BALANCE, GAIT, AND ENDURANCE: ICD-10-CM

## 2021-06-14 PROCEDURE — 97112 NEUROMUSCULAR REEDUCATION: CPT | Performed by: PHYSICAL THERAPIST

## 2021-06-14 PROCEDURE — 97110 THERAPEUTIC EXERCISES: CPT | Performed by: PHYSICAL THERAPIST

## 2021-06-14 NOTE — PROGRESS NOTES
Physical Therapy Daily Progress Note    Patient: Prema Hoang   : 1950  Referring practitioner: No Known Provider  Today's Date: 2021  Patient seen for 3 sessions    Visit Diagnoses:    ICD-10-CM ICD-9-CM   1. Status post left hip replacement  Z96.642 V43.64   2. Impaired functional mobility, balance, gait, and endurance  Z74.09 V49.89   3. Pain, hip  M25.559 719.45       Subjective   Prema Hoang reports: that her hip is feeling better.  Notes that she has fewer aches in the hip and feels more mobile in the leg.  Able to walk better without her cane.      Objective   Presents walking with a straight cane but is able to walk a few steps without it with a good gait pattern                         See Exercise, Manual, and Modality Logs for complete treatment.     Patient Education: cont HEP    Assessment/Plan  Patient is improving as her pain has decreased and her functional activity tolerance has increased.  Patient highly motivated with her program to maximize her function.    Progress strengthening /stabilization /functional activity           Timed:  Manual Therapy:    0     mins  77737;  Therapeutic Exercise:    40     mins  80243;     Neuromuscular Tapan:    10    mins  46566;    Therapeutic Activity:     5     mins  94775;  Discussed home activities and positioning    Ultrasound:              mins  50349;    Iontophoresis              ____    mins  Dry Needling               ____    mins        Untimed:  Electrical Stimulation:             mins  92279 ( );  Mechanical Traction:             mins  65974;     Timed Treatment:   55   mins   Total Treatment:     70   mins    Cecelia Parker, PT  [place] License # [id number]  Physical Therapist

## 2021-06-16 ENCOUNTER — TREATMENT (OUTPATIENT)
Dept: PHYSICAL THERAPY | Facility: CLINIC | Age: 71
End: 2021-06-16

## 2021-06-16 DIAGNOSIS — Z74.09 IMPAIRED FUNCTIONAL MOBILITY, BALANCE, GAIT, AND ENDURANCE: ICD-10-CM

## 2021-06-16 DIAGNOSIS — Z96.642 STATUS POST LEFT HIP REPLACEMENT: Primary | ICD-10-CM

## 2021-06-16 DIAGNOSIS — M25.559 PAIN, HIP: ICD-10-CM

## 2021-06-16 PROCEDURE — 97110 THERAPEUTIC EXERCISES: CPT | Performed by: PHYSICAL THERAPIST

## 2021-06-16 PROCEDURE — 97112 NEUROMUSCULAR REEDUCATION: CPT | Performed by: PHYSICAL THERAPIST

## 2021-06-16 PROCEDURE — 97530 THERAPEUTIC ACTIVITIES: CPT | Performed by: PHYSICAL THERAPIST

## 2021-06-16 NOTE — PROGRESS NOTES
Physical Therapy Daily Progress Note    Patient: Prema Hoang   : 1950  Referring practitioner: No Known Provider  Today's Date: 2021  Patient seen for 4 sessions    Visit Diagnoses:    ICD-10-CM ICD-9-CM   1. Status post left hip replacement  Z96.642 V43.64   2. Impaired functional mobility, balance, gait, and endurance  Z74.09 V49.89   3. Pain, hip  M25.559 719.45       Subjective   Prema Hoang reports: that she is feeling stronger and very pleased with her progress.  States that she has muscle soreness but no joint pain.       Objective   Presents walking with cane but not truly putting weight on the cane.    See Exercise, Manual, and Modality Logs for complete treatment.     Patient Education: steady increase in time of exercises. Rest positions    Assessment/Plan  Patient continues to developed improved motion and strength.  Highly motivated but cautious to avoid over exertion.  8 weeks post Left partial hpi replacement    Progress strengthening /stabilization /functional activity           Timed:  Manual Therapy:    0     mins  40826;  Therapeutic Exercise:    35     mins  98235;     Neuromuscular Tapan:    10    mins  83625;    Therapeutic Activity:     10     mins  82779;     Ultrasound:              mins  28146;    Iontophoresis              ____    mins  Dry Needling               ____    mins        Untimed:  Electrical Stimulation:             mins  78949 ( );  Mechanical Traction:             mins  55041;     Timed Treatment:   55   mins   Total Treatment:     70   mins    Cecelia Parker, PT  [place] License # [id number]  Physical Therapist

## 2021-06-21 ENCOUNTER — TREATMENT (OUTPATIENT)
Dept: PHYSICAL THERAPY | Facility: CLINIC | Age: 71
End: 2021-06-21

## 2021-06-21 DIAGNOSIS — Z74.09 IMPAIRED FUNCTIONAL MOBILITY, BALANCE, GAIT, AND ENDURANCE: ICD-10-CM

## 2021-06-21 DIAGNOSIS — Z96.642 STATUS POST LEFT HIP REPLACEMENT: Primary | ICD-10-CM

## 2021-06-21 PROCEDURE — 97110 THERAPEUTIC EXERCISES: CPT | Performed by: PHYSICAL THERAPIST

## 2021-06-21 PROCEDURE — 97530 THERAPEUTIC ACTIVITIES: CPT | Performed by: PHYSICAL THERAPIST

## 2021-06-21 PROCEDURE — 97112 NEUROMUSCULAR REEDUCATION: CPT | Performed by: PHYSICAL THERAPIST

## 2021-06-21 NOTE — PROGRESS NOTES
Physical Therapy Daily Progress Note    Patient: Prema Hoang   : 1950  Referring practitioner: No Known Provider  Today's Date: 2021  Patient seen for 5 sessions    Visit Diagnoses:    ICD-10-CM ICD-9-CM   1. Status post left hip replacement  Z96.642 V43.64   2. Impaired functional mobility, balance, gait, and endurance  Z74.09 V49.89       Subjective   Prema Hoang reports: that she is feeling better everyday.  Notes that she is getting stronger in the hip.  Does not use her cane to walk at home.      Objective   Presents without use of straight cane today.  Symmetrical gait when paying attention to the gait pattern    See Exercise, Manual, and Modality Logs for complete treatment.     Patient Education: importance of proper gait pattern    Assessment/Plan  Renetta is doing very well post partial left hip replacement.  Her pain is minimal and her function is increasing.  Will be going back to MD next week in [place].    Progress strengthening /stabilization /functional activity           Timed:  Manual Therapy:    0     mins  02370;  Therapeutic Exercise:    18/40     mins  93813;     Neuromuscular Tapan:    10    mins  11989;    Therapeutic Activity:     10     mins  84887;     Ultrasound:              mins  17552;    Iontophoresis              ____    mins  Dry Needling               ____    mins        Untimed:  Electrical Stimulation:             mins  96986 ( );  Mechanical Traction:             mins  82691;     Timed Treatment:   38   mins   Total Treatment:     75   mins    Cecelia Parker, PT  [place] License # [id number]  Physical Therapist

## 2021-06-23 ENCOUNTER — TREATMENT (OUTPATIENT)
Dept: PHYSICAL THERAPY | Facility: CLINIC | Age: 71
End: 2021-06-23

## 2021-06-23 DIAGNOSIS — Z74.09 IMPAIRED FUNCTIONAL MOBILITY, BALANCE, GAIT, AND ENDURANCE: ICD-10-CM

## 2021-06-23 DIAGNOSIS — Z96.642 STATUS POST LEFT HIP REPLACEMENT: Primary | ICD-10-CM

## 2021-06-23 PROCEDURE — 97110 THERAPEUTIC EXERCISES: CPT | Performed by: PHYSICAL THERAPIST

## 2021-06-23 PROCEDURE — 97112 NEUROMUSCULAR REEDUCATION: CPT | Performed by: PHYSICAL THERAPIST

## 2021-06-23 PROCEDURE — 97530 THERAPEUTIC ACTIVITIES: CPT | Performed by: PHYSICAL THERAPIST

## 2021-06-23 NOTE — PROGRESS NOTES
Physical Therapy Daily Progress Note    Patient: Prema Hoang   : 1950  Referring practitioner: No Known Provider  Today's Date: 2021  Patient seen for 6 sessions    Visit Diagnoses:    ICD-10-CM ICD-9-CM   1. Status post left hip replacement  Z96.642 V43.64   2. Impaired functional mobility, balance, gait, and endurance  Z74.09 V49.89       Subjective   Prema Hoang reports: that the hip continues to get stronger. Still notices some weakness in the hip with attempts of climbing stirs      Objective   Presents with symmetrical gait pattern when paying attention to the gait    Requires slight UE assist to  Climb a flight of steps    See Exercise, Manual, and Modality Logs for complete treatment.     Patient Education: pace self with activity    Assessment/Plan  Renetta continues to demonstrate improvement with therapy.  She has been very compliant.  Still has weakness in hip abduction and extension causing some difficulty with climbing stairs.     Progress strengthening /stabilization /functional activity           Timed:  Manual Therapy:    0     mins  62847;  Therapeutic Exercise:    25/40     mins  73080;     Neuromuscular Tapan:    10    mins  81820;    Therapeutic Activity:     10     mins  62575;     Ultrasound:              mins  34013;    Iontophoresis              ____    mins  Dry Needling               ____    mins        Untimed:  Electrical Stimulation:             mins  33321 ( );  Mechanical Traction:             mins  53794;     Timed Treatment:   45   mins   Total Treatment:     75   mins    Ceeclia Parker, PT  [place] License # [id number]  Physical Therapist

## 2021-06-24 ENCOUNTER — TREATMENT (OUTPATIENT)
Dept: PHYSICAL THERAPY | Facility: CLINIC | Age: 71
End: 2021-06-24

## 2021-06-24 DIAGNOSIS — Z74.09 IMPAIRED FUNCTIONAL MOBILITY, BALANCE, GAIT, AND ENDURANCE: ICD-10-CM

## 2021-06-24 DIAGNOSIS — Z96.642 STATUS POST LEFT HIP REPLACEMENT: Primary | ICD-10-CM

## 2021-06-24 PROCEDURE — 97110 THERAPEUTIC EXERCISES: CPT | Performed by: PHYSICAL THERAPIST

## 2021-06-24 PROCEDURE — 97112 NEUROMUSCULAR REEDUCATION: CPT | Performed by: PHYSICAL THERAPIST

## 2021-06-24 PROCEDURE — 97530 THERAPEUTIC ACTIVITIES: CPT | Performed by: PHYSICAL THERAPIST

## 2021-06-24 NOTE — PROGRESS NOTES
MD Letter/Reassessment  Patient: Prema Hoang   : 1950    Date of Initial Visit: Type: THERAPY  Noted: 2021  Today's Date: 2021  Patient seen for 7 sessions    Treatment has included: therapeutic exercise, neuromuscular re-education, manual therapy, therapeutic activity and cryotherapy    Subjective   Renetta states that she feels much better since starting therapy.  She states that her pain has decreased, her strength has increased and she is able to walk without her cane now.  Still has stiffness early in the morning.  She reports a constant awareness of the posterior lateral hip with tenderness noted in the incisiion area.  She reports that her pain varies from 2-6/10 but the more intense pain goes away very quickly. She denies any radiation of symptoms or tingling into the leg.     Objective - she walks with a slightly antalgic gait pattern but does have equal stance phase and step length  Her incision site is well healed without signs of infection   Hip AROM - Flexion >90*  Extension 15*,  Abduction 35*,  ER 44*,  IR 15*  Strength - Flexion 4/5,   Abduction 4+/5,  Extension  4/5  Sensation - intact except for small area around the incision  Palpation - minimal tenderness posterior to the incisioi  Special tests - no signs of instability in the hip, negative Trendelenburg   Activity tolerances - she is able to stand and walk for 45 minutes to an hour without increased symptoms.  She is able to sit for only 10-15 minutes without feeling the need to readjust her position for comfort.     Assessment/Plan  Patient has demonstrated moderate to signficant improvement since the initiation of therapy.  The pain has decreased in intensity but is still constantly aware of it.  The motion has increased.  The activity tolerances have increased but not to her desired levels.  I feel that the patient would benefit from a further short course of therpay.  If you have any questions concerning the care, please do  not hesitate to contact me.          PT Signature: Cecelia Parker, PT        Manual Therapy:    0     mins  59224;  Therapeutic Exercise:    15/30     mins  80087;     Neuromuscular Tapan:    10    mins  68359;    Therapeutic Activity:     15     mins  33700;  Assessed status and gait activities      Timed Treatment:   40   mins   Total Treatment:     75   mins

## 2021-07-22 ENCOUNTER — OFFICE VISIT (OUTPATIENT)
Dept: ORTHOPEDIC SURGERY | Facility: CLINIC | Age: 71
End: 2021-07-22

## 2021-07-22 VITALS — HEIGHT: 65 IN | WEIGHT: 150 LBS | BODY MASS INDEX: 24.99 KG/M2 | TEMPERATURE: 97.6 F

## 2021-07-22 DIAGNOSIS — M12.811 ROTATOR CUFF ARTHROPATHY OF BOTH SHOULDERS: ICD-10-CM

## 2021-07-22 DIAGNOSIS — Z96.651 STATUS POST TOTAL RIGHT KNEE REPLACEMENT: Primary | ICD-10-CM

## 2021-07-22 DIAGNOSIS — M12.812 ROTATOR CUFF ARTHROPATHY OF BOTH SHOULDERS: ICD-10-CM

## 2021-07-22 PROCEDURE — 99213 OFFICE O/P EST LOW 20 MIN: CPT | Performed by: ORTHOPAEDIC SURGERY

## 2021-08-05 RX ORDER — MELOXICAM 15 MG/1
TABLET ORAL
Qty: 90 TABLET | Refills: 0 | Status: SHIPPED | OUTPATIENT
Start: 2021-08-05 | End: 2021-11-15

## 2021-08-10 ENCOUNTER — OFFICE VISIT (OUTPATIENT)
Dept: INTERNAL MEDICINE | Facility: CLINIC | Age: 71
End: 2021-08-10

## 2021-08-10 VITALS
RESPIRATION RATE: 16 BRPM | DIASTOLIC BLOOD PRESSURE: 80 MMHG | OXYGEN SATURATION: 98 % | SYSTOLIC BLOOD PRESSURE: 126 MMHG | TEMPERATURE: 96.8 F | BODY MASS INDEX: 25.33 KG/M2 | HEIGHT: 65 IN | HEART RATE: 78 BPM | WEIGHT: 152 LBS

## 2021-08-10 DIAGNOSIS — N39.0 URINARY TRACT INFECTION WITHOUT HEMATURIA, SITE UNSPECIFIED: Primary | ICD-10-CM

## 2021-08-10 DIAGNOSIS — M79.10 MYALGIA: ICD-10-CM

## 2021-08-10 DIAGNOSIS — M25.50 POLYARTHRALGIA: ICD-10-CM

## 2021-08-10 DIAGNOSIS — K80.20 CALCULUS OF GALLBLADDER WITHOUT CHOLECYSTITIS WITHOUT OBSTRUCTION: ICD-10-CM

## 2021-08-10 LAB
BILIRUB BLD-MCNC: NEGATIVE MG/DL
CLARITY, POC: CLEAR
COLOR UR: YELLOW
GLUCOSE UR STRIP-MCNC: NEGATIVE MG/DL
KETONES UR QL: NEGATIVE
LEUKOCYTE EST, POC: NEGATIVE
NITRITE UR-MCNC: NEGATIVE MG/ML
PH UR: 6.5 [PH] (ref 5–8)
PROT UR STRIP-MCNC: NEGATIVE MG/DL
RBC # UR STRIP: NEGATIVE /UL
SP GR UR: 1.02 (ref 1–1.03)
UROBILINOGEN UR QL: NORMAL

## 2021-08-10 PROCEDURE — 99214 OFFICE O/P EST MOD 30 MIN: CPT | Performed by: INTERNAL MEDICINE

## 2021-08-10 PROCEDURE — 81003 URINALYSIS AUTO W/O SCOPE: CPT | Performed by: INTERNAL MEDICINE

## 2021-08-10 RX ORDER — TRETINOIN 1 MG/G
CREAM TOPICAL
COMMUNITY
Start: 2021-08-03 | End: 2021-12-14

## 2021-08-10 NOTE — PROGRESS NOTES
"Chief Complaint  Urinary Tract Infection    Subjective          Prema Hoang presents to [facility]  History of Present Illness  She has had some back pain.  No significant dysuria fever or frequency necessarily.  Concerned about UTI understandably.  Has had history of UTI in the past.  Also complains of a lot of diffuse arthralgias and myalgias.  She is not sure if it correlates with starting the Crestor but we did talk about maybe stopping that for a few weeks and see if it got any better.  It looks like someone ordered an autoimmune screen about a year ago but it never got done.  No significant joint swelling per se  She saw a physician in [place] because of bruising.  They did a CBC and that was normal.  We did talk about that being a common phenomenon.  The mobile can make it worse, Nexium can make it worse.  She is not on any aspirin therapy  Right upper quadrant pain intermittently usually worse in the mornings.  Not necessarily after meals.  She does have cholelithiasis based on previous imaging before  Objective   Vital Signs:   /80 (BP Location: Left arm, Patient Position: Sitting, Cuff Size: Large Adult)   Pulse 78   Temp 96.8 °F (36 °C) (Temporal)   Resp 16   Ht 165.1 cm (65\")   Wt 68.9 kg (152 lb)   SpO2 98%   BMI 25.29 kg/m²     Physical Exam  Vitals and nursing note reviewed.   Constitutional:       Appearance: Normal appearance.   HENT:      Head: Normocephalic and atraumatic.   Cardiovascular:      Rate and Rhythm: Normal rate and regular rhythm.   Pulmonary:      Effort: Pulmonary effort is normal.      Breath sounds: Normal breath sounds.   Abdominal:      General: Abdomen is flat.      Palpations: Abdomen is soft.   Musculoskeletal:         General: No swelling or deformity.      Cervical back: Neck supple.      Right lower leg: No edema.      Left lower leg: No edema.   Skin:     General: Skin is warm.      Capillary Refill: " Capillary refill takes less than 2 seconds.      Findings: No rash.      Comments: Pretty typical bruising in the lower extremities and on the forearms   Neurological:      General: No focal deficit present.      Mental Status: She is alert and oriented to person, place, and time.        Result Review : Reviewed previous labs and imaging                Assessment and Plan multiple concerns.  History of UTI with negative urine dip.  We will send a culture.  Follow-up pending  Multiple arthralgias and myalgias.  Check autoimmune and inflammatory screen.  I would probably stop the Crestor for 3 to 4 weeks and see if notices any difference.  Follow-up in 4 weeks  Upper quadrant pain with known cholelithiasis albeit symptoms are classic as they are not postprandial but worse in the mornings.  Continue to monitor this.  Probably should add a CMP just to make sure liver function test are normal we can do a HIDA scan if that continues.  She says it feels from normal today.  Try to associated with fatty meals to see if that aggravates it.  Her bruising I think is pretty typical for age.  She can try to reduce or discontinue the meloxicam and see if that helps any.  Her CBC previously was normal.  I not think she needs any other work-up at this point  Diagnoses and all orders for this visit:    1. Urinary tract infection without hematuria, site unspecified (Primary)  -     POCT urinalysis dipstick, automated  -     Urine Culture - Urine, Urine, Clean Catch    2. Polyarthralgia  -     C-reactive protein  -     Rheumatoid Factor, Quant  -     Cyclic Citrul Peptide Antibody, IgG / IgA  -     CHRISTOPHER    3. Myalgia  -     CK        Follow Up   No follow-ups on file.  Patient was given instructions and counseling regarding her condition or for health maintenance advice. Please see specific information pulled into the AVS if appropriate.

## 2021-08-11 ENCOUNTER — TRANSCRIBE ORDERS (OUTPATIENT)
Dept: ADMINISTRATIVE | Facility: HOSPITAL | Age: 71
End: 2021-08-11

## 2021-08-11 DIAGNOSIS — M81.0 SENILE OSTEOPOROSIS: Primary | ICD-10-CM

## 2021-08-12 LAB
ALBUMIN SERPL-MCNC: 4.6 G/DL (ref 3.5–5.2)
ALBUMIN/GLOB SERPL: 2.2 G/DL
ALP SERPL-CCNC: 82 U/L (ref 39–117)
ALT SERPL-CCNC: 28 U/L (ref 1–33)
ANA SER QL: POSITIVE
AST SERPL-CCNC: 35 U/L (ref 1–32)
BACTERIA UR CULT: NORMAL
BACTERIA UR CULT: NORMAL
BILIRUB SERPL-MCNC: 0.4 MG/DL (ref 0–1.2)
BUN SERPL-MCNC: 20 MG/DL (ref 8–23)
BUN/CREAT SERPL: 26.7 (ref 7–25)
CALCIUM SERPL-MCNC: 10.3 MG/DL (ref 8.6–10.5)
CCP IGA+IGG SERPL IA-ACNC: 6 UNITS (ref 0–19)
CHLORIDE SERPL-SCNC: 99 MMOL/L (ref 98–107)
CK SERPL-CCNC: 89 U/L (ref 20–180)
CO2 SERPL-SCNC: 25.2 MMOL/L (ref 22–29)
CREAT SERPL-MCNC: 0.75 MG/DL (ref 0.57–1)
CRP SERPL-MCNC: <0.3 MG/DL (ref 0–0.5)
DSDNA AB SER-ACNC: <1 IU/ML (ref 0–9)
GLOBULIN SER CALC-MCNC: 2.1 GM/DL
GLUCOSE SERPL-MCNC: 93 MG/DL (ref 65–99)
Lab: NORMAL
POTASSIUM SERPL-SCNC: 4.6 MMOL/L (ref 3.5–5.2)
PROT SERPL-MCNC: 6.7 G/DL (ref 6–8.5)
RHEUMATOID FACT SERPL-ACNC: <10 IU/ML (ref 0–13.9)
SODIUM SERPL-SCNC: 139 MMOL/L (ref 136–145)

## 2021-09-13 ENCOUNTER — OFFICE VISIT (OUTPATIENT)
Dept: INTERNAL MEDICINE | Facility: CLINIC | Age: 71
End: 2021-09-13

## 2021-09-13 VITALS
WEIGHT: 151 LBS | DIASTOLIC BLOOD PRESSURE: 80 MMHG | TEMPERATURE: 96.9 F | SYSTOLIC BLOOD PRESSURE: 120 MMHG | HEART RATE: 85 BPM | HEIGHT: 65 IN | BODY MASS INDEX: 25.16 KG/M2 | RESPIRATION RATE: 16 BRPM | OXYGEN SATURATION: 98 %

## 2021-09-13 DIAGNOSIS — M25.50 POLYARTHRALGIA: Primary | ICD-10-CM

## 2021-09-13 DIAGNOSIS — R09.89 CAROTID BRUIT, UNSPECIFIED LATERALITY: ICD-10-CM

## 2021-09-13 DIAGNOSIS — G45.9 TIA (TRANSIENT ISCHEMIC ATTACK): ICD-10-CM

## 2021-09-13 PROCEDURE — 99214 OFFICE O/P EST MOD 30 MIN: CPT | Performed by: INTERNAL MEDICINE

## 2021-09-13 NOTE — PROGRESS NOTES
"Chief Complaint  Hyperlipidemia    Subjective          Prema Hoang presents to [facility]  History of Present Illness  This was a follow-up for discontinuing the Crestor because of myalgias and arthralgias to see if it improves.  Apparently it did not.  Overall she is doing pretty well although did also again mention headaches about 2 weeks ago with some visual changes.  She has had another episode of that and has never had migraines in the past.  She has an ophthalmology follow-up but was concerned about other possibilities.  Objective   Vital Signs:   /80 (BP Location: Left arm, Patient Position: Sitting, Cuff Size: Large Adult)   Pulse 85   Temp 96.9 °F (36.1 °C) (Temporal)   Resp 16   Ht 165.1 cm (65\")   Wt 68.5 kg (151 lb)   SpO2 98%   BMI 25.13 kg/m²     Physical Exam  Vitals and nursing note reviewed.   Constitutional:       Appearance: Normal appearance.   HENT:      Head: Normocephalic and atraumatic.   Cardiovascular:      Rate and Rhythm: Normal rate and regular rhythm.   Pulmonary:      Effort: Pulmonary effort is normal.      Breath sounds: Normal breath sounds.   Abdominal:      General: Abdomen is flat.      Palpations: Abdomen is soft.   Musculoskeletal:         General: No swelling or deformity.      Cervical back: Neck supple.      Right lower leg: No edema.      Left lower leg: No edema.   Skin:     General: Skin is warm.      Capillary Refill: Capillary refill takes less than 2 seconds.      Findings: No rash.   Neurological:      General: No focal deficit present.      Mental Status: She is alert and oriented to person, place, and time.        Result Review : Reviewed previous labs                Assessment and Plan myalgias and arthralgias probably osteoarthritis and age-related myalgias.  We talked about autoimmune possibilities in the past.  Positive CHRISTOPHER but negative screen otherwise.  The Crestor discontinuation did not make " any difference.  She is going to restart that back.  No additional work-up at this time  Headaches with some visual changes.  I think we need to do an MRI and carotid Dopplers.  Currently no symptoms but has happened 2 or 3 times since we had seen her last.  Schedule MRI and carotid Doppler ASAP.  And follow-up after that.  She is getting ready go to [place] and will be back in 2 weeks.  We will try to get that done after she returns unless they can get it done beforehand which would be great.  I do not think she needs it done stat.  There is no other focal neurologic deficits and TIA less likely.  Diagnoses and all orders for this visit:    1. Polyarthralgia (Primary)    2. TIA (transient ischemic attack)  -     MRI Brain Without Contrast; Future  -     Duplex Carotid Ultrasound CAR; Future    3. Carotid bruit, unspecified laterality  -     Duplex Carotid Ultrasound CAR; Future        Follow Up   No follow-ups on file.  Patient was given instructions and counseling regarding her condition or for health maintenance advice. Please see specific information pulled into the AVS if appropriate.

## 2021-09-14 ENCOUNTER — TELEPHONE (OUTPATIENT)
Dept: INTERNAL MEDICINE | Facility: CLINIC | Age: 71
End: 2021-09-14

## 2021-09-14 NOTE — TELEPHONE ENCOUNTER
Pt called stating that she saw Ophthalmology and they would like to draw a ESR and CRP; pt is wanting to know if she can get these drawn here?

## 2021-09-15 ENCOUNTER — TELEPHONE (OUTPATIENT)
Dept: INTERNAL MEDICINE | Facility: CLINIC | Age: 71
End: 2021-09-15

## 2021-09-24 ENCOUNTER — APPOINTMENT (OUTPATIENT)
Dept: INFUSION THERAPY | Facility: HOSPITAL | Age: 71
End: 2021-09-24

## 2021-09-27 VITALS — BODY MASS INDEX: 25.29 KG/M2 | WEIGHT: 152 LBS

## 2021-09-28 RX ORDER — ZOLEDRONIC ACID 5 MG/100ML
5 INJECTION, SOLUTION INTRAVENOUS ONCE
Status: CANCELLED | OUTPATIENT
Start: 2021-10-01

## 2021-10-01 ENCOUNTER — HOSPITAL ENCOUNTER (OUTPATIENT)
Dept: INFUSION THERAPY | Facility: HOSPITAL | Age: 71
Discharge: HOME | End: 2021-10-01

## 2021-10-08 ENCOUNTER — APPOINTMENT (OUTPATIENT)
Dept: MRI IMAGING | Facility: HOSPITAL | Age: 71
End: 2021-10-08

## 2021-10-08 ENCOUNTER — APPOINTMENT (OUTPATIENT)
Dept: CARDIOLOGY | Facility: HOSPITAL | Age: 71
End: 2021-10-08

## 2021-10-14 RX ORDER — ZOLEDRONIC ACID 5 MG/100ML
5 INJECTION, SOLUTION INTRAVENOUS ONCE
Status: CANCELLED | OUTPATIENT
Start: 2021-10-14

## 2021-10-18 ENCOUNTER — HOSPITAL ENCOUNTER (OUTPATIENT)
Dept: INFUSION THERAPY | Facility: HOSPITAL | Age: 71
Discharge: HOME | End: 2021-10-18
Admitting: OBSTETRICS & GYNECOLOGY

## 2021-10-18 VITALS
OXYGEN SATURATION: 99 % | HEART RATE: 98 BPM | SYSTOLIC BLOOD PRESSURE: 131 MMHG | WEIGHT: 150 LBS | DIASTOLIC BLOOD PRESSURE: 87 MMHG | TEMPERATURE: 96.9 F | RESPIRATION RATE: 20 BRPM | BODY MASS INDEX: 24.96 KG/M2

## 2021-10-18 DIAGNOSIS — M81.0 SENILE OSTEOPOROSIS: Primary | ICD-10-CM

## 2021-10-18 LAB
ANION GAP SERPL CALCULATED.3IONS-SCNC: 10.5 MMOL/L (ref 5–15)
BUN SERPL-MCNC: 25 MG/DL (ref 8–23)
BUN/CREAT SERPL: 33.3 (ref 7–25)
CALCIUM SPEC-SCNC: 9.7 MG/DL (ref 8.6–10.5)
CHLORIDE SERPL-SCNC: 105 MMOL/L (ref 98–107)
CO2 SERPL-SCNC: 24.5 MMOL/L (ref 22–29)
CREAT SERPL-MCNC: 0.75 MG/DL (ref 0.57–1)
GFR SERPL CREATININE-BSD FRML MDRD: 76 ML/MIN/1.73
GLUCOSE SERPL-MCNC: 110 MG/DL (ref 65–99)
POTASSIUM SERPL-SCNC: 4.2 MMOL/L (ref 3.5–5.2)
SODIUM SERPL-SCNC: 140 MMOL/L (ref 136–145)

## 2021-10-18 PROCEDURE — 25010000002 ZOLEDRONIC ACID 5 MG/100ML SOLUTION: Performed by: OBSTETRICS & GYNECOLOGY

## 2021-10-18 PROCEDURE — 96365 THER/PROPH/DIAG IV INF INIT: CPT

## 2021-10-18 PROCEDURE — 80048 BASIC METABOLIC PNL TOTAL CA: CPT | Performed by: OBSTETRICS & GYNECOLOGY

## 2021-10-18 PROCEDURE — 36415 COLL VENOUS BLD VENIPUNCTURE: CPT

## 2021-10-18 RX ORDER — ZOLEDRONIC ACID 5 MG/100ML
5 INJECTION, SOLUTION INTRAVENOUS ONCE
Status: CANCELLED | OUTPATIENT
Start: 2022-10-18

## 2021-10-18 RX ORDER — ZOLEDRONIC ACID 5 MG/100ML
5 INJECTION, SOLUTION INTRAVENOUS ONCE
Status: COMPLETED | OUTPATIENT
Start: 2021-10-18 | End: 2021-10-18

## 2021-10-18 RX ADMIN — ZOLEDRONIC ACID 5 MG: 5 INJECTION, SOLUTION INTRAVENOUS at 14:39

## 2021-10-18 NOTE — PATIENT INSTRUCTIONS
Zoledronic Acid Injection (Paget's Disease, Osteoporosis)  What is this medicine?  ZOLEDRONIC ACID (ALBA le josie ik AS id) slows calcium loss from bones. It treats Paget's disease and osteoporosis. It may be used in other people at risk for bone loss.  This medicine may be used for other purposes; ask your health care provider or pharmacist if you have questions.  COMMON BRAND NAME(S): Reclast, Zometa  What should I tell my health care provider before I take this medicine?  They need to know if you have any of these conditions:  · bleeding disorder  · cancer  · dental disease  · kidney disease  · low levels of calcium in the blood  · low red blood cell counts  · lung or breathing disease (asthma)  · receiving steroids like dexamethasone or prednisone  · an unusual or allergic reaction to zoledronic acid, other medicines, foods, dyes, or preservatives  · pregnant or trying to get pregnant  · breast-feeding  How should I use this medicine?  This drug is injected into a vein. It is given by a health care provider in a hospital or clinic setting.  A special MedGuide will be given to you before each treatment. Be sure to read this information carefully each time.  Talk to your health care provider about the use of this drug in children. Special care may be needed.  Overdosage: If you think you have taken too much of this medicine contact a poison control center or emergency room at once.  NOTE: This medicine is only for you. Do not share this medicine with others.  What if I miss a dose?  Keep appointments for follow-up doses. It is important not to miss your dose. Call your health care provider if you are unable to keep an appointment.  What may interact with this medicine?  · certain antibiotics given by injection  · NSAIDs, medicines for pain and inflammation, like ibuprofen or naproxen  · some diuretics like bumetanide, furosemide  · teriparatide  This list may not describe all possible interactions. Give your health  care provider a list of all the medicines, herbs, non-prescription drugs, or dietary supplements you use. Also tell them if you smoke, drink alcohol, or use illegal drugs. Some items may interact with your medicine.  What should I watch for while using this medicine?  Visit your health care provider for regular checks on your progress. It may be some time before you see the benefit from this drug.  Some people who take this drug have severe bone, joint, or muscle pain. This drug may also increase your risk for jaw problems or a broken thigh bone. Tell your health care provider right away if you have severe pain in your jaw, bones, joints, or muscles. Tell you health care provider if you have any pain that does not go away or that gets worse.  You should make sure you get enough calcium and vitamin D while you are taking this drug. Discuss the foods you eat and the vitamins you take with your health care provider.  You may need blood work done while you are taking this drug.  Tell your dentist and dental surgeon that you are taking this drug. You should not have major dental surgery while on this drug. See your dentist to have a dental exam and fix any dental problems before starting this drug. Take good care of your teeth while on this drug. Make sure you see your dentist for regular follow-up appointments.  What side effects may I notice from receiving this medicine?  Side effects that you should report to your doctor or health care provider as soon as possible:  · allergic reactions (skin rash, itching or hives; swelling of the face, lips, or tongue)  · bone pain  · infection (fever, chills, cough, sore throat, pain or trouble passing urine)  · jaw pain, especially after dental work  · joint pain  · kidney injury (trouble passing urine or change in the amount of urine)  · low calcium levels (fast heartbeat; muscle cramps or pain; pain, tingling, or numbness in the hands or feet; seizures)  · low red blood cell  counts (trouble breathing; feeling faint; lightheaded, falls; unusually weak or tired)  · muscle pain  · palpitations  · redness, blistering, peeling, or loosening of the skin, including inside the mouth  Side effects that usually do not require medical attention (report to your doctor or health care provider if they continue or are bothersome):  · diarrhea  · eye irritation, itching, or pain  · fever  · general ill feeling or flu-like symptoms  · headache  · increase in blood pressure  · nausea  · pain, redness, or irritation at site where injected  · stomach pain  · upset stomach  This list may not describe all possible side effects. Call your doctor for medical advice about side effects. You may report side effects to FDA at 4-907-CLW-5102.  Where should I keep my medicine?  This drug is given in a hospital or clinic. It will not be stored at home.  NOTE: This sheet is a summary. It may not cover all possible information. If you have questions about this medicine, talk to your doctor, pharmacist, or health care provider.  © 2021 [employer]/Gold Standard (2020-10-05 11:46:18)  Osteoporosis    Osteoporosis happens when the bones become thin and less dense than normal. Osteoporosis makes bones more brittle and fragile and more likely to break (fracture).  Over time, osteoporosis can cause your bones to become so weak that they fracture after a minor fall. Bones in the hip, wrist, and spine are most likely to fracture due to osteoporosis.  What are the causes?  The exact cause of this condition is not known.  What increases the risk?  You are more likely to develop this condition if you:  · Have family members with this condition.  · Have poor nutrition.  · Use the following:  ? Steroid medicines, such as prednisone.  ? Anti-seizure medicines.  ? Nicotine or tobacco, such as cigarettes, e-cigarettes, and chewing tobacco.  · Are female.  · Are age 50 or older.  · Are not physically active (are sedentary).  · Are of   or  descent.  · Have a small body frame.  What are the signs or symptoms?  A fracture might be the first sign of osteoporosis, especially if the fracture results from a fall or injury that usually would not cause a bone to break. Other signs and symptoms include:  · Pain in the neck or low back.  · Stooped posture.  · Loss of height.  How is this diagnosed?  This condition may be diagnosed based on:  · Your medical history.  · A physical exam.  · A bone mineral density test, also called a DXA or DEXA test (dual-energy X-ray absorptiometry test). This test uses X-rays to measure the amount of minerals in your bones.  How is this treated?  This condition may be treated by:  · Making lifestyle changes, such as:  ? Including foods with more calcium and vitamin D in your diet.  ? Doing weight-bearing and muscle-strengthening exercises.  ? Stopping tobacco use.  ? Limiting alcohol intake.  · Taking medicine to slow the process of bone loss or to increase bone density.  · Taking daily supplements of calcium and vitamin D.  · Taking hormone replacement medicines, such as estrogen for women and testosterone for men.  · Monitoring your levels of calcium and vitamin D.  The goal of treatment is to strengthen your bones and lower your risk for a fracture.  Follow these instructions at home:  Eating and drinking  Include calcium and vitamin D in your diet. Calcium is important for bone health, and vitamin D helps your body absorb calcium. Good sources of calcium and vitamin D include:  · Certain fatty fish, such as salmon and tuna.  · Products that have calcium and vitamin D added to them (are fortified), such as fortified cereals.  · Egg yolks.  · Cheese.  · Liver.    Activity  Do exercises as told by your health care provider. Ask your health care provider what exercises and activities are safe for you. You should do:  · Exercises that make you work against gravity (weight-bearing exercises), such as chely chi,  yoga, or walking.  · Exercises to strengthen muscles, such as lifting weights.  Lifestyle  · Do not drink alcohol if:  ? Your health care provider tells you not to drink.  ? You are pregnant, may be pregnant, or are planning to become pregnant.  · If you drink alcohol:  ? Limit how much you use to:  § 0-1 drink a day for women.  § 0-2 drinks a day for men.  · Know how much alcohol is in your drink. In the [place] one drink equals one 12 oz bottle of beer (355 mL), one 5 oz glass of wine (148 mL), or one 1½ oz glass of hard liquor (44 mL).  · Do not use any products that contain nicotine or tobacco, such as cigarettes, e-cigarettes, and chewing tobacco. If you need help quitting, ask your health care provider.  Preventing falls  · Use devices to help you move around (mobility aids) as needed, such as canes, walkers, scooters, or crutches.  · Keep rooms well-lit and clutter-free.  · Remove tripping hazards from walkways, including cords and throw rugs.  · Install grab bars in bathrooms and safety rails on stairs.  · Use rubber mats in the bathroom and other areas that are often wet or slippery.  · Wear closed-toe shoes that fit well and support your feet. Wear shoes that have rubber soles or low heels.  · Review your medicines with your health care provider. Some medicines can cause dizziness or changes in blood pressure, which can increase your risk of falling.  General instructions  · Take over-the-counter and prescription medicines only as told by your health care provider.  · Keep all follow-up visits. This is important.  Contact a health care provider if:  · You have never been screened for osteoporosis and you are:  ? A woman who is age 65 or older.  ? A man who is age 70 or older.  Get help right away if:  · You fall or injure yourself.  Summary  · Osteoporosis is thinning and loss of density in your bones. This makes bones more brittle and fragile and more likely to break (fracture),even with minor falls.  · The  goal of treatment is to strengthen your bones and lower your risk for a fracture.  · Include calcium and vitamin D in your diet. Calcium is important for bone health, and vitamin D helps your body absorb calcium.  · Talk with your health care provider about screening for osteoporosis if you are a woman who is age 65 or older, or a man who is age 70 or older.  This information is not intended to replace advice given to you by your health care provider. Make sure you discuss any questions you have with your health care provider.  Document Revised: 06/03/2021 Document Reviewed: 06/03/2021  [employer] Patient Education © 2021 [employer]  Zoledronic Acid Injection (Paget's Disease, Osteoporosis)  What is this medicine?  ZOLEDRONIC ACID (ALBA keturah galindo ik AS id) slows calcium loss from bones. It treats Paget's disease and osteoporosis. It may be used in other people at risk for bone loss.  This medicine may be used for other purposes; ask your health care provider or pharmacist if you have questions.  COMMON BRAND NAME(S): Reclast, Zometa  What should I tell my health care provider before I take this medicine?  They need to know if you have any of these conditions:  · bleeding disorder  · cancer  · dental disease  · kidney disease  · low levels of calcium in the blood  · low red blood cell counts  · lung or breathing disease (asthma)  · receiving steroids like dexamethasone or prednisone  · an unusual or allergic reaction to zoledronic acid, other medicines, foods, dyes, or preservatives  · pregnant or trying to get pregnant  · breast-feeding  How should I use this medicine?  This drug is injected into a vein. It is given by a health care provider in a hospital or clinic setting.  A special MedGuide will be given to you before each treatment. Be sure to read this information carefully each time.  Talk to your health care provider about the use of this drug in children. Special care may be needed.  Overdosage: If you think  you have taken too much of this medicine contact a poison control center or emergency room at once.  NOTE: This medicine is only for you. Do not share this medicine with others.  What if I miss a dose?  Keep appointments for follow-up doses. It is important not to miss your dose. Call your health care provider if you are unable to keep an appointment.  What may interact with this medicine?  · certain antibiotics given by injection  · NSAIDs, medicines for pain and inflammation, like ibuprofen or naproxen  · some diuretics like bumetanide, furosemide  · teriparatide  This list may not describe all possible interactions. Give your health care provider a list of all the medicines, herbs, non-prescription drugs, or dietary supplements you use. Also tell them if you smoke, drink alcohol, or use illegal drugs. Some items may interact with your medicine.  What should I watch for while using this medicine?  Visit your health care provider for regular checks on your progress. It may be some time before you see the benefit from this drug.  Some people who take this drug have severe bone, joint, or muscle pain. This drug may also increase your risk for jaw problems or a broken thigh bone. Tell your health care provider right away if you have severe pain in your jaw, bones, joints, or muscles. Tell you health care provider if you have any pain that does not go away or that gets worse.  You should make sure you get enough calcium and vitamin D while you are taking this drug. Discuss the foods you eat and the vitamins you take with your health care provider.  You may need blood work done while you are taking this drug.  Tell your dentist and dental surgeon that you are taking this drug. You should not have major dental surgery while on this drug. See your dentist to have a dental exam and fix any dental problems before starting this drug. Take good care of your teeth while on this drug. Make sure you see your dentist for regular  follow-up appointments.  What side effects may I notice from receiving this medicine?  Side effects that you should report to your doctor or health care provider as soon as possible:  · allergic reactions (skin rash, itching or hives; swelling of the face, lips, or tongue)  · bone pain  · infection (fever, chills, cough, sore throat, pain or trouble passing urine)  · jaw pain, especially after dental work  · joint pain  · kidney injury (trouble passing urine or change in the amount of urine)  · low calcium levels (fast heartbeat; muscle cramps or pain; pain, tingling, or numbness in the hands or feet; seizures)  · low red blood cell counts (trouble breathing; feeling faint; lightheaded, falls; unusually weak or tired)  · muscle pain  · palpitations  · redness, blistering, peeling, or loosening of the skin, including inside the mouth  Side effects that usually do not require medical attention (report to your doctor or health care provider if they continue or are bothersome):  · diarrhea  · eye irritation, itching, or pain  · fever  · general ill feeling or flu-like symptoms  · headache  · increase in blood pressure  · nausea  · pain, redness, or irritation at site where injected  · stomach pain  · upset stomach  This list may not describe all possible side effects. Call your doctor for medical advice about side effects. You may report side effects to FDA at 5-501-FDA-4535.  Where should I keep my medicine?  This drug is given in a hospital or clinic. It will not be stored at home.  NOTE: This sheet is a summary. It may not cover all possible information. If you have questions about this medicine, talk to your doctor, pharmacist, or health care provider.  © 2021 [employer]/Gold Standard (2020-10-05 11:46:18)

## 2021-10-18 NOTE — PROGRESS NOTES
Cockcroft-Gault CrCl and calcium level within normal limits for IV Reclast.  Tolerated infusion well.  AVS given and patient discharged ambulatory after appointment completed.

## 2021-10-19 ENCOUNTER — OFFICE VISIT (OUTPATIENT)
Dept: INTERNAL MEDICINE | Facility: CLINIC | Age: 71
End: 2021-10-19

## 2021-10-19 VITALS
DIASTOLIC BLOOD PRESSURE: 72 MMHG | HEART RATE: 78 BPM | SYSTOLIC BLOOD PRESSURE: 118 MMHG | WEIGHT: 150 LBS | RESPIRATION RATE: 16 BRPM | HEIGHT: 65 IN | BODY MASS INDEX: 24.99 KG/M2 | OXYGEN SATURATION: 98 % | TEMPERATURE: 96.8 F

## 2021-10-19 DIAGNOSIS — Z23 IMMUNIZATION DUE: ICD-10-CM

## 2021-10-19 DIAGNOSIS — N39.0 URINARY TRACT INFECTION WITHOUT HEMATURIA, SITE UNSPECIFIED: Primary | ICD-10-CM

## 2021-10-19 LAB
BILIRUB BLD-MCNC: NEGATIVE MG/DL
CLARITY, POC: CLEAR
COLOR UR: YELLOW
EXPIRATION DATE: NORMAL
GLUCOSE UR STRIP-MCNC: NEGATIVE MG/DL
KETONES UR QL: NEGATIVE
LEUKOCYTE EST, POC: NEGATIVE
Lab: NORMAL
NITRITE UR-MCNC: NEGATIVE MG/ML
PH UR: 5.5 [PH] (ref 5–8)
PROT UR STRIP-MCNC: NEGATIVE MG/DL
RBC # UR STRIP: NEGATIVE /UL
SP GR UR: 1.02 (ref 1–1.03)
UROBILINOGEN UR QL: NORMAL

## 2021-10-19 PROCEDURE — G0008 ADMIN INFLUENZA VIRUS VAC: HCPCS | Performed by: INTERNAL MEDICINE

## 2021-10-19 PROCEDURE — 81003 URINALYSIS AUTO W/O SCOPE: CPT | Performed by: INTERNAL MEDICINE

## 2021-10-19 PROCEDURE — 99213 OFFICE O/P EST LOW 20 MIN: CPT | Performed by: INTERNAL MEDICINE

## 2021-10-19 PROCEDURE — 90662 IIV NO PRSV INCREASED AG IM: CPT | Performed by: INTERNAL MEDICINE

## 2021-10-19 RX ORDER — NITROFURANTOIN 25; 75 MG/1; MG/1
100 CAPSULE ORAL 2 TIMES DAILY
Qty: 14 CAPSULE | Refills: 0 | OUTPATIENT
Start: 2021-10-19 | End: 2021-12-14

## 2021-10-19 NOTE — PROGRESS NOTES
"Chief Complaint  Urinary Tract Infection    Subjective          Prema Hoang presents to [facility] for possible UTI.   Patient has had burning with urination for over a week. Initially thought it was due to bike riding but has not rode in over a week.   Denies any suprapubic pain, discharge, fevers, or chills. Urine is normal in color.   Patient also would like to get flu shot today.     Objective   Vital Signs:   /72 (BP Location: Right arm, Patient Position: Sitting, Cuff Size: Large Adult)   Pulse 78   Temp 96.8 °F (36 °C) (Temporal)   Resp 16   Ht 165.1 cm (65\")   Wt 68 kg (150 lb)   SpO2 98%   BMI 24.96 kg/m²     Physical Exam  Vitals reviewed.   Constitutional:       General: She is not in acute distress.     Appearance: Normal appearance.   HENT:      Head: Normocephalic and atraumatic.   Eyes:      General: No scleral icterus.        Right eye: No discharge.         Left eye: No discharge.      Conjunctiva/sclera: Conjunctivae normal.   Cardiovascular:      Rate and Rhythm: Normal rate and regular rhythm.      Pulses: Normal pulses.      Heart sounds: No murmur heard.      Pulmonary:      Effort: Pulmonary effort is normal. No respiratory distress.      Breath sounds: Normal breath sounds. No wheezing or rhonchi.   Abdominal:      General: Abdomen is flat. Bowel sounds are normal. There is no distension.      Palpations: Abdomen is soft.      Tenderness: There is no abdominal tenderness.   Musculoskeletal:         General: No swelling or deformity.   Lymphadenopathy:      Cervical: No cervical adenopathy.   Skin:     General: Skin is warm and dry.      Findings: No rash.   Neurological:      General: No focal deficit present.      Mental Status: She is alert and oriented to person, place, and time. Mental status is at baseline.   Psychiatric:         Mood and Affect: Mood normal.         Behavior: Behavior normal.          Result Review : "     UA    Urinalysis 12/18/20 8/10/21 10/19/21   Ketones, UA Negative Negative Negative   Leukocytes, UA Negative Negative Negative                  Assessment and Plan      Diagnoses and all orders for this visit:    1. Urinary tract infection without hematuria, site unspecified (Primary)  -     POCT urinalysis dipstick, automated  -     Urine Culture - Urine, Urine, Clean Catch; Future  -     Urine Culture - Urine, Urine, Clean Catch  - Urine dip unremarkable, will send for culture  - Treat empirically with antibiotics as below given symptoms; will call with culture results and make changes as necessary  - Return to clinic if symptoms worsen or fail to improve    2. Immunization due  -     Fluzone High-Dose 65+yrs ([id number])    Other orders  -     nitrofurantoin, macrocrystal-monohydrate, (MACROBID) 100 MG capsule; Take 1 capsule by mouth 2 (Two) Times a Day.  Dispense: 14 capsule; Refill: 0        Follow Up   No follow-ups on file.    Patient was given instructions and counseling regarding her condition or for health maintenance advice. Please see specific information pulled into the AVS if appropriate.     Jennifer Villanueva MD  Internal Medicine and Pediatrics, PGY-3    I saw and reviewed the patient with the resident.  We discussed the plan and agree with the above documentation and recommendations.  Cystitis/UTI.  Atrophic vaginitis.  Macrobid prescription sent in and check urine culture.  If continues to have recurrent problems we can do daily prophylaxis.  We will see how that goes.

## 2021-10-21 LAB
BACTERIA UR CULT: NORMAL
BACTERIA UR CULT: NORMAL

## 2021-11-15 RX ORDER — MELOXICAM 15 MG/1
TABLET ORAL
Qty: 90 TABLET | Refills: 0 | Status: SHIPPED | OUTPATIENT
Start: 2021-11-15 | End: 2021-12-22 | Stop reason: HOSPADM

## 2021-12-14 ENCOUNTER — HOSPITAL ENCOUNTER (EMERGENCY)
Facility: HOSPITAL | Age: 71
Discharge: HOME | End: 2021-12-14
Attending: EMERGENCY MEDICINE | Admitting: EMERGENCY MEDICINE

## 2021-12-14 ENCOUNTER — APPOINTMENT (OUTPATIENT)
Dept: CT IMAGING | Facility: HOSPITAL | Age: 71
End: 2021-12-14

## 2021-12-14 ENCOUNTER — TELEPHONE (OUTPATIENT)
Dept: INTERNAL MEDICINE | Facility: CLINIC | Age: 71
End: 2021-12-14

## 2021-12-14 VITALS
OXYGEN SATURATION: 100 % | BODY MASS INDEX: 25.49 KG/M2 | WEIGHT: 153 LBS | RESPIRATION RATE: 16 BRPM | SYSTOLIC BLOOD PRESSURE: 167 MMHG | TEMPERATURE: 98.6 F | HEIGHT: 65 IN | DIASTOLIC BLOOD PRESSURE: 105 MMHG | HEART RATE: 88 BPM

## 2021-12-14 DIAGNOSIS — K80.50 BILIARY COLIC: Primary | ICD-10-CM

## 2021-12-14 LAB
ALBUMIN SERPL-MCNC: 4.4 G/DL (ref 3.5–5.2)
ALBUMIN/GLOB SERPL: 1.7 G/DL
ALP SERPL-CCNC: 74 U/L (ref 39–117)
ALT SERPL W P-5'-P-CCNC: 31 U/L (ref 1–33)
ANION GAP SERPL CALCULATED.3IONS-SCNC: 11.3 MMOL/L (ref 5–15)
AST SERPL-CCNC: 39 U/L (ref 1–32)
BACTERIA UR QL AUTO: ABNORMAL /HPF
BASOPHILS # BLD AUTO: 0.02 10*3/MM3 (ref 0–0.2)
BASOPHILS NFR BLD AUTO: 0.4 % (ref 0–1.5)
BILIRUB SERPL-MCNC: 0.7 MG/DL (ref 0–1.2)
BILIRUB UR QL STRIP: NEGATIVE
BUN SERPL-MCNC: 19 MG/DL (ref 8–23)
BUN/CREAT SERPL: 22.6 (ref 7–25)
CALCIUM SPEC-SCNC: 9.7 MG/DL (ref 8.6–10.5)
CHLORIDE SERPL-SCNC: 102 MMOL/L (ref 98–107)
CLARITY UR: CLEAR
CO2 SERPL-SCNC: 23.7 MMOL/L (ref 22–29)
COLOR UR: YELLOW
CREAT SERPL-MCNC: 0.84 MG/DL (ref 0.57–1)
DEPRECATED RDW RBC AUTO: 49.5 FL (ref 37–54)
EOSINOPHIL # BLD AUTO: 0.07 10*3/MM3 (ref 0–0.4)
EOSINOPHIL NFR BLD AUTO: 1.2 % (ref 0.3–6.2)
ERYTHROCYTE [DISTWIDTH] IN BLOOD BY AUTOMATED COUNT: 14.3 % (ref 12.3–15.4)
GFR SERPL CREATININE-BSD FRML MDRD: 67 ML/MIN/1.73
GLOBULIN UR ELPH-MCNC: 2.6 GM/DL
GLUCOSE SERPL-MCNC: 139 MG/DL (ref 65–99)
GLUCOSE UR STRIP-MCNC: NEGATIVE MG/DL
HCT VFR BLD AUTO: 47.9 % (ref 34–46.6)
HGB BLD-MCNC: 15.1 G/DL (ref 12–15.9)
HGB UR QL STRIP.AUTO: NEGATIVE
HYALINE CASTS UR QL AUTO: ABNORMAL /LPF
IMM GRANULOCYTES # BLD AUTO: 0 10*3/MM3 (ref 0–0.05)
IMM GRANULOCYTES NFR BLD AUTO: 0 % (ref 0–0.5)
KETONES UR QL STRIP: NEGATIVE
LEUKOCYTE ESTERASE UR QL STRIP.AUTO: ABNORMAL
LIPASE SERPL-CCNC: 43 U/L (ref 13–60)
LYMPHOCYTES # BLD AUTO: 1.94 10*3/MM3 (ref 0.7–3.1)
LYMPHOCYTES NFR BLD AUTO: 34.3 % (ref 19.6–45.3)
MCH RBC QN AUTO: 29.3 PG (ref 26.6–33)
MCHC RBC AUTO-ENTMCNC: 31.5 G/DL (ref 31.5–35.7)
MCV RBC AUTO: 93 FL (ref 79–97)
MONOCYTES # BLD AUTO: 0.61 10*3/MM3 (ref 0.1–0.9)
MONOCYTES NFR BLD AUTO: 10.8 % (ref 5–12)
NEUTROPHILS NFR BLD AUTO: 3.01 10*3/MM3 (ref 1.7–7)
NEUTROPHILS NFR BLD AUTO: 53.3 % (ref 42.7–76)
NITRITE UR QL STRIP: NEGATIVE
PH UR STRIP.AUTO: 6.5 [PH] (ref 4.5–8)
PLATELET # BLD AUTO: 228 10*3/MM3 (ref 140–450)
PMV BLD AUTO: 10.6 FL (ref 6–12)
POTASSIUM SERPL-SCNC: 4.3 MMOL/L (ref 3.5–5.2)
PROT SERPL-MCNC: 7 G/DL (ref 6–8.5)
PROT UR QL STRIP: NEGATIVE
RBC # BLD AUTO: 5.15 10*6/MM3 (ref 3.77–5.28)
RBC # UR STRIP: ABNORMAL /HPF
REF LAB TEST METHOD: ABNORMAL
SODIUM SERPL-SCNC: 137 MMOL/L (ref 136–145)
SP GR UR STRIP: 1.01 (ref 1–1.03)
SQUAMOUS #/AREA URNS HPF: ABNORMAL /HPF
UROBILINOGEN UR QL STRIP: ABNORMAL
WBC # UR STRIP: ABNORMAL /HPF
WBC NRBC COR # BLD: 5.65 10*3/MM3 (ref 3.4–10.8)

## 2021-12-14 PROCEDURE — 0 IOPAMIDOL PER 1 ML: Performed by: EMERGENCY MEDICINE

## 2021-12-14 PROCEDURE — 81001 URINALYSIS AUTO W/SCOPE: CPT | Performed by: EMERGENCY MEDICINE

## 2021-12-14 PROCEDURE — 85025 COMPLETE CBC W/AUTO DIFF WBC: CPT | Performed by: EMERGENCY MEDICINE

## 2021-12-14 PROCEDURE — 25010000002 KETOROLAC TROMETHAMINE PER 15 MG

## 2021-12-14 PROCEDURE — 83690 ASSAY OF LIPASE: CPT | Performed by: EMERGENCY MEDICINE

## 2021-12-14 PROCEDURE — 99283 EMERGENCY DEPT VISIT LOW MDM: CPT | Performed by: EMERGENCY MEDICINE

## 2021-12-14 PROCEDURE — 25010000002 ONDANSETRON PER 1 MG: Performed by: EMERGENCY MEDICINE

## 2021-12-14 PROCEDURE — 99283 EMERGENCY DEPT VISIT LOW MDM: CPT

## 2021-12-14 PROCEDURE — 74177 CT ABD & PELVIS W/CONTRAST: CPT

## 2021-12-14 PROCEDURE — 96375 TX/PRO/DX INJ NEW DRUG ADDON: CPT

## 2021-12-14 PROCEDURE — 80053 COMPREHEN METABOLIC PANEL: CPT | Performed by: EMERGENCY MEDICINE

## 2021-12-14 PROCEDURE — 96374 THER/PROPH/DIAG INJ IV PUSH: CPT

## 2021-12-14 RX ORDER — ONDANSETRON 2 MG/ML
4 INJECTION INTRAMUSCULAR; INTRAVENOUS ONCE
Status: COMPLETED | OUTPATIENT
Start: 2021-12-14 | End: 2021-12-14

## 2021-12-14 RX ORDER — TRAMADOL HYDROCHLORIDE 50 MG/1
50 TABLET ORAL EVERY 8 HOURS PRN
Qty: 9 TABLET | Refills: 0 | Status: SHIPPED | OUTPATIENT
Start: 2021-12-14 | End: 2021-12-17

## 2021-12-14 RX ORDER — SODIUM CHLORIDE 0.9 % (FLUSH) 0.9 %
10 SYRINGE (ML) INJECTION AS NEEDED
Status: DISCONTINUED | OUTPATIENT
Start: 2021-12-14 | End: 2021-12-14 | Stop reason: HOSPADM

## 2021-12-14 RX ORDER — KETOROLAC TROMETHAMINE 30 MG/ML
INJECTION, SOLUTION INTRAMUSCULAR; INTRAVENOUS
Status: COMPLETED
Start: 2021-12-14 | End: 2021-12-14

## 2021-12-14 RX ORDER — HYDROMORPHONE HCL 110MG/55ML
1 PATIENT CONTROLLED ANALGESIA SYRINGE INTRAVENOUS ONCE
Status: DISCONTINUED | OUTPATIENT
Start: 2021-12-14 | End: 2021-12-14

## 2021-12-14 RX ORDER — KETOROLAC TROMETHAMINE 30 MG/ML
30 INJECTION, SOLUTION INTRAMUSCULAR; INTRAVENOUS ONCE
Status: COMPLETED | OUTPATIENT
Start: 2021-12-14 | End: 2021-12-14

## 2021-12-14 RX ADMIN — SODIUM CHLORIDE 1000 ML: 9 INJECTION, SOLUTION INTRAVENOUS at 13:14

## 2021-12-14 RX ADMIN — KETOROLAC TROMETHAMINE 30 MG: 30 INJECTION, SOLUTION INTRAMUSCULAR; INTRAVENOUS at 12:35

## 2021-12-14 RX ADMIN — ONDANSETRON 4 MG: 2 INJECTION INTRAMUSCULAR; INTRAVENOUS at 12:41

## 2021-12-14 RX ADMIN — IOPAMIDOL 100 ML: 755 INJECTION, SOLUTION INTRAVENOUS at 13:57

## 2021-12-14 RX ADMIN — KETOROLAC TROMETHAMINE 30 MG: 30 INJECTION, SOLUTION INTRAMUSCULAR at 12:35

## 2021-12-14 NOTE — ED PROVIDER NOTES
Subjective   History of Present Illness  History of Present Illness    Chief complaint: Abdominal pain    Location: Right upper quadrant    Quality/Severity: Moderate to severe sharp pain    Timing/Duration: Just began rather suddenly this afternoon    Modifying Factors: None    Narrative: This patient presents for evaluation of new onset pain in the right upper quadrant area.  The pain came on rather abruptly this afternoon.  She has had some nausea but no vomiting.  She has not had any recent diarrhea or blood in the stools.  She denies any dysuria or hematuria symptoms either.  She was told in the past that she has gallstones in the gallbladder but has not arranged for any surgical plan of care regarding gallstones.    Associated Symptoms: As above    Review of Systems   Constitutional: Negative for activity change, diaphoresis and fever.   HENT: Negative.    Eyes: Negative for pain and visual disturbance.   Respiratory: Negative for cough and shortness of breath.    Cardiovascular: Negative for chest pain.   Gastrointestinal: Positive for abdominal pain and nausea. Negative for blood in stool, constipation, diarrhea and vomiting.   Genitourinary: Negative for dysuria.   Skin: Negative for color change and rash.   Neurological: Negative for syncope, weakness and headaches.   All other systems reviewed and are negative.      Past Medical History:   Diagnosis Date   • Acute sinusitis    • Breast cancer (HCC) 2002, 2007    Lumpectomy with radiation and chemotherapy on the left followed by bilateral mastectomy 2007   • Chest pain, unspecified    • Condition not found     LOCALIZED ENLARGED LYMPH NODES   • Condition not found 05/01/2017    ULTRASOUND ABD   NEG , GB POLYPS   • Cystitis, unspecified without hematuria    • Environmental allergies    • Essential (primary) hypertension    • Fatty (change of) liver, not elsewhere classified    • Fatty liver    • Fluttering heart    • Gallstones    • GERD (gastroesophageal  reflux disease)    • Headache    • High cholesterol    • History of concussion 02/09/2020   • History of kidney stones 1985   • Hx of bone density study    • Hx of CT scan of chest 08/01/2020    NEG   • Hx of mammogram    • Hyperlipidemia    • Insomnia, unspecified    • Low back pain    • Multiple bruises     ARMS, LEGS    • Multiple skin tears     ARMS AND LEGS   • Osteoarthritis     hips and knees, spine   • Osteoporosis    • Other diseases of pharynx    • Other fatigue    • Other nonthrombocytopenic purpura (HCC)    • Pain in unspecified joint    • PONV (postoperative nausea and vomiting)    • Right knee pain    • Thin skin    • Unspecified abdominal pain    • UTI (urinary tract infection)     SITE NOT SPECIFIED   • Wound of right leg     INSTRUCTED TO LET SURGEON KNOW IF THIS IS NOT HEALED WITHIN A COUPLE DAYS OF PAT       Allergies   Allergen Reactions   • Propoxyphene GI Intolerance       Past Surgical History:   Procedure Laterality Date   • BACK SURGERY  2004   • BREAST LUMPECTOMY Left 2002   • CATARACT EXTRACTION W/ INTRAOCULAR LENS  IMPLANT, BILATERAL     • COLONOSCOPY     • COMBINED HYSTERECTOMY VAGINAL / OOPHORECTOMY / A & P REPAIR  2002   • MASTECTOMY Bilateral 2007   • MRI INTRAOPERATIVE BRAIN W/ OR W/O CONTRAST  12/2016    ESSENTIALLY NEG   • NECK SURGERY  2006   • NECK SURGERY     • TOTAL KNEE ARTHROPLASTY Right 8/28/2020    Procedure: TOTAL KNEE ARTHROPLASTY;  Surgeon: Dean Trinidad MD;  Location: [facility] OR;  Service: Orthopedics;  Laterality: Right;       Family History   Problem Relation Age of Onset   • No Known Problems Mother    • Hypertension Father    • Aneurysm Father    • Heart disease Father    • Other Father         mesothelioma   • Cancer Brother         colon, liver   • Malig Hyperthermia Neg Hx        Social History     Socioeconomic History   • Marital status:    Tobacco Use   • Smoking status: Former Smoker     Types: Cigarettes     Quit date: 1995     Years since  quittin.9   • Smokeless tobacco: Never Used   • Tobacco comment: SOCIAL, MAYBE 1 PACK PER WEEK PRACTICE FUSION SAYS NO SMOKING   Substance and Sexual Activity   • Alcohol use: Yes     Alcohol/week: 7.0 standard drinks     Types: 7 Glasses of wine per week     Comment: 7 days per week   • Drug use: No   • Sexual activity: Defer       ED Triage Vitals [21 1218]   Temp Heart Rate Resp BP SpO2   98.6 °F (37 °C) 88 16 (!) 167/105 100 %      Temp src Heart Rate Source Patient Position BP Location FiO2 (%)   Oral Monitor Sitting Right arm --         Objective   Physical Exam  Vitals and nursing note reviewed.   Constitutional:       General: She is in acute distress.      Appearance: She is well-developed.      Comments: Appears to be in some discomfort, holding her right upper quadrant with her hands   HENT:      Head: Normocephalic and atraumatic.   Eyes:      General:         Right eye: No discharge.         Left eye: No discharge.      Pupils: Pupils are equal, round, and reactive to light.   Cardiovascular:      Rate and Rhythm: Normal rate and regular rhythm.      Heart sounds: Normal heart sounds. No murmur heard.  No friction rub. No gallop.    Pulmonary:      Effort: Pulmonary effort is normal. No respiratory distress.      Breath sounds: Normal breath sounds. No stridor.   Abdominal:      Palpations: Abdomen is soft.      Tenderness: There is abdominal tenderness in the right upper quadrant and epigastric area. There is no guarding or rebound. Negative signs include Rovsing's sign and McBurney's sign.      Hernia: No hernia is present.   Musculoskeletal:         General: No deformity. Normal range of motion.      Cervical back: Normal range of motion and neck supple.   Skin:     General: Skin is warm and dry.      Coloration: Skin is not cyanotic.      Findings: No erythema or rash.   Neurological:      Mental Status: She is alert and oriented to person, place, and time.   Psychiatric:          Behavior: Behavior normal.         Thought Content: Thought content normal.         Judgment: Judgment normal.       Results for orders placed or performed during the hospital encounter of 12/14/21   Comprehensive Metabolic Panel    Specimen: Blood   Result Value Ref Range    Glucose 139 (H) 65 - 99 mg/dL    BUN 19 8 - 23 mg/dL    Creatinine 0.84 0.57 - 1.00 mg/dL    Sodium 137 136 - 145 mmol/L    Potassium 4.3 3.5 - 5.2 mmol/L    Chloride 102 98 - 107 mmol/L    CO2 23.7 22.0 - 29.0 mmol/L    Calcium 9.7 8.6 - 10.5 mg/dL    Total Protein 7.0 6.0 - 8.5 g/dL    Albumin 4.40 3.50 - 5.20 g/dL    ALT (SGPT) 31 1 - 33 U/L    AST (SGOT) 39 (H) 1 - 32 U/L    Alkaline Phosphatase 74 39 - 117 U/L    Total Bilirubin 0.7 0.0 - 1.2 mg/dL    eGFR Non African Amer 67 >60 mL/min/1.73    Globulin 2.6 gm/dL    A/G Ratio 1.7 g/dL    BUN/Creatinine Ratio 22.6 7.0 - 25.0    Anion Gap 11.3 5.0 - 15.0 mmol/L   Urinalysis With Microscopic If Indicated (No Culture) - Urine, Clean Catch    Specimen: Urine, Clean Catch   Result Value Ref Range    Color, UA Yellow Yellow, Straw    Appearance, UA Clear Clear    pH, UA 6.5 4.5 - 8.0    Specific Gravity, UA 1.015 1.003 - 1.030    Glucose, UA Negative Negative    Ketones, UA Negative Negative    Bilirubin, UA Negative Negative    Blood, UA Negative Negative    Protein, UA Negative Negative    Leuk Esterase, UA Trace (A) Negative    Nitrite, UA Negative Negative    Urobilinogen, UA 0.2 E.U./dL 0.2 - 1.0 E.U./dL   CBC Auto Differential    Specimen: Blood   Result Value Ref Range    WBC 5.65 3.40 - 10.80 10*3/mm3    RBC 5.15 3.77 - 5.28 10*6/mm3    Hemoglobin 15.1 12.0 - 15.9 g/dL    Hematocrit 47.9 (H) 34.0 - 46.6 %    MCV 93.0 79.0 - 97.0 fL    MCH 29.3 26.6 - 33.0 pg    MCHC 31.5 31.5 - 35.7 g/dL    RDW 14.3 12.3 - 15.4 %    RDW-SD 49.5 37.0 - 54.0 fl    MPV 10.6 6.0 - 12.0 fL    Platelets 228 140 - 450 10*3/mm3    Neutrophil % 53.3 42.7 - 76.0 %    Lymphocyte % 34.3 19.6 - 45.3 %    Monocyte %  10.8 5.0 - 12.0 %    Eosinophil % 1.2 0.3 - 6.2 %    Basophil % 0.4 0.0 - 1.5 %    Immature Grans % 0.0 0.0 - 0.5 %    Neutrophils, Absolute 3.01 1.70 - 7.00 10*3/mm3    Lymphocytes, Absolute 1.94 0.70 - 3.10 10*3/mm3    Monocytes, Absolute 0.61 0.10 - 0.90 10*3/mm3    Eosinophils, Absolute 0.07 0.00 - 0.40 10*3/mm3    Basophils, Absolute 0.02 0.00 - 0.20 10*3/mm3    Immature Grans, Absolute 0.00 0.00 - 0.05 10*3/mm3   Lipase    Specimen: Blood   Result Value Ref Range    Lipase 43 13 - 60 U/L   Urinalysis, Microscopic Only - Urine, Clean Catch    Specimen: Urine, Clean Catch   Result Value Ref Range    RBC, UA None Seen None Seen /HPF    WBC, UA 0-2 (A) None Seen /HPF    Bacteria, UA None Seen None Seen /HPF    Squamous Epithelial Cells, UA 3-6 (A) None Seen, 0-2 /HPF    Hyaline Casts, UA None Seen None Seen /LPF    Methodology Manual Light Microscopy      RADIOLOGY        Study: CT abdomen pelvis with contrast    Findings: 1. Diverticulosis of the colon with questionable wall thickening of the junction of descending colon and sigmoid colon which may indicate mild diverticulitis. Suggest treatment for diverticulitis with follow-up short interval colonoscopy if not recently  performed  2. Distended gallbladder with cholelithiasis and focal small nodules in the gallbladder wall favored to represent polyps. No definite gallbladder wall thickening or pericholecystic fluid.  3. There are 2 small intrarenal nonobstructing stones on the left. No ureterectasis or ureteral calculus  4. Degenerative disc disease at L5-S1.    Interpreted contemporaneously with treatment by Dr. Kelley, independently viewed by me    Procedures           ED Course  ED Course as of 12/14/21 1555   Tue Dec 14, 2021   9734 I reviewed the labs and CT scan from today's visit.  Gave the patient 1 dose of Toradol shortly after she arrived here.  That made her feel much better rather quickly.  When I went back to reassess her, she said that her pain  "was gone.  I think that she was having some biliary colic as the etiology for her right upper quadrant pain.  There is a remark on the CT scan about some diverticulosis but her pain was not at all involving the left lower quadrant and her stools have been normal.  Therefore I will refer her to outpatient follow-up in the general surgery clinic to discuss her biliary colic symptoms further.  I advised her to stick with a gentle, bland diet.  I reviewed with her the usual \"return to ER\" instructions for any worsening signs or symptoms.  Then she was discharged home in good condition. [TIFFANIE]      ED Course User Index  [TIFFANIE] Gaetano Moseley MD                                   [facility] reviewed by Gaetano Moseley MD             MDM  Number of Diagnoses or Management Options     Amount and/or Complexity of Data Reviewed  Clinical lab tests: reviewed and ordered  Tests in the radiology section of CPT®: reviewed and ordered  Decide to obtain previous medical records or to obtain history from someone other than the patient: yes  Review and summarize past medical records: yes  Independent visualization of images, tracings, or specimens: yes    Risk of Complications, Morbidity, and/or Mortality  Presenting problems: moderate  Diagnostic procedures: moderate  Management options: moderate        Final diagnoses:   Biliary colic       ED Disposition  ED Disposition     ED Disposition Condition Comment    Discharge Stable           Christopher Hirsch MD  [address] [id number] [phone]    Call today  Call the general surgery office today to schedule an appointment for further evaluation         Medication List      New Prescriptions    traMADol 50 MG tablet  Commonly known as: ULTRAM  Take 1 tablet by mouth Every 8 (Eight) Hours As Needed for Moderate Pain  for up to 3 days.        Stop    cyclobenzaprine 10 MG tablet  Commonly known as: FLEXERIL     docusate sodium 100 MG capsule     famciclovir 500 MG " tablet  Commonly known as: FAMVIR     HYDROcodone-acetaminophen 5-325 MG per tablet  Commonly known as: NORCO     nitrofurantoin (macrocrystal-monohydrate) 100 MG capsule  Commonly known as: MACROBID     ondansetron 4 MG tablet  Commonly known as: ZOFRAN     tretinoin 0.1 % cream  Commonly known as: RETIN-A           Where to Get Your Medications      These medications were sent to [id number] - [place], [place] - [address] AT [facility] - [phone]  - [phone] [place] [address], [facility] [address]    Phone: [phone]   · traMADol 50 MG tablet          Gaetano Moseley MD  12/14/21 4671

## 2021-12-14 NOTE — DISCHARGE INSTRUCTIONS
Recommend gentle, bland diet as tolerated. Must follow-up in the general surgery clinic for further evaluation. Please return to the emergency room for any worsening pain, fevers, nausea, vomiting, weakness or any other concerns.

## 2021-12-15 ENCOUNTER — OFFICE VISIT (OUTPATIENT)
Dept: SURGERY | Facility: CLINIC | Age: 71
End: 2021-12-15

## 2021-12-15 VITALS
SYSTOLIC BLOOD PRESSURE: 120 MMHG | HEIGHT: 65 IN | DIASTOLIC BLOOD PRESSURE: 80 MMHG | BODY MASS INDEX: 25.66 KG/M2 | WEIGHT: 154 LBS

## 2021-12-15 DIAGNOSIS — K80.20 CALCULUS OF GALLBLADDER WITHOUT CHOLECYSTITIS WITHOUT OBSTRUCTION: Primary | ICD-10-CM

## 2021-12-15 PROCEDURE — 99204 OFFICE O/P NEW MOD 45 MIN: CPT | Performed by: SURGERY

## 2021-12-15 RX ORDER — CETIRIZINE HYDROCHLORIDE 10 MG/1
CAPSULE, LIQUID FILLED ORAL
COMMUNITY
End: 2021-12-17 | Stop reason: SDUPTHER

## 2021-12-15 NOTE — H&P
PATIENT INFORMATION  Prema Hoang       - 1950    CHIEF COMPLAINT  Chief Complaint   Patient presents with   • Abdominal Pain       HISTORY OF PRESENT ILLNESS  HPI she complains of episodic epigastric and right upper quadrant pain. She had an episode yesterday requiring an ER visit here. She had some nausea but no vomiting she denies any fever she said some chills last evening. She denies any jaundice type symptoms. She had similar symptoms a week ago. Of note she wants to leave soon to go to [facility] and will be there till April.        REVIEW OF SYSTEMS  Review of Systems   Constitutional: Negative for activity change, chills, fever and unexpected weight change.   HENT: Negative for congestion.    Eyes: Negative for visual disturbance.   Respiratory: Negative for shortness of breath.    Cardiovascular: Negative for chest pain and palpitations.   Gastrointestinal: Positive for abdominal pain. Negative for blood in stool.   Endocrine: Negative for cold intolerance and heat intolerance.   Genitourinary: Negative for hematuria.   Musculoskeletal: Negative for gait problem.   Skin: Negative for color change.   Allergic/Immunologic: Negative for immunocompromised state.   Neurological: Negative for weakness and light-headedness.   Hematological: Negative for adenopathy.   Psychiatric/Behavioral: Negative for sleep disturbance. The patient is not nervous/anxious.          ACTIVE PROBLEMS  Patient Active Problem List    Diagnosis    • Essential (primary) hypertension [I10]    • Primary osteoarthritis of left knee [M17.12]    • Status post total right knee replacement [Z96.651]    • Senile osteoporosis [M81.0]    • Abnormal EKG [R94.31]    • Vomiting [R11.10]    • Primary osteoarthritis of right knee [M17.11]    • Rotator cuff arthropathy of both shoulders [M12.811, M12.812]    • Chronic pain of both knees [M25.561, M25.562, G89.29]    • Polyarthralgia [M25.50]    • Nontraumatic subluxation of extensor  tendon at metacarpophalangeal joint of right hand [M66.241]    • Bilateral primary osteoarthritis of knee [M17.0]    • Bilateral shoulder pain [M25.511, M25.512]    • H/O bilateral mastectomy [Z90.13]    • Chronic back pain [M54.9, G89.29]    • Rotator cuff tear arthropathy of both shoulders [M75.101, M12.811, M12.812, M75.102]    • Chronic pain of both shoulders [M25.511, G89.29, M25.512]    • Bilateral chronic knee pain [M25.561, M25.562, G89.29]    • Right upper quadrant pain [R10.11]    • Abnormal liver enzymes [R74.8]    • Abnormal magnetic resonance imaging study [R93.89]    • Arthritis [M19.90]    • Hereditary essential tremor [G25.0]    • Gastroesophageal reflux disease without esophagitis [K21.9]    • Headache [R51.9]    • Hyperlipidemia [E78.5]    • Impaired fasting glucose [R73.01]    • Osteoarthritis [M19.90]    • Osteoporosis [M81.0]    • Nonalcoholic steatohepatitis (BYRD) [K75.81]    • Shoulder impingement [M75.40]    • Trigger ring finger of right hand [M65.341]    • AC (acromioclavicular) arthritis [M19.019]    • Hx of fusion of cervical spine [Z98.1]    • Post-operative state [Z98.890]    • Acquired absence of genital organ [Z90.79]    • Asymptomatic postmenopausal status [Z78.0]    • Disorder of bone and cartilage [M89.9, M94.9]    • Hx antineoplastic chemotherapy [Z92.21]    • Other postprocedural status(V45.89) [Z98.89]    • Personal history of malignant neoplasm of breast [Z85.3]    • Prophylactic use of aromatase inhibitors [Z79.811]    • Sebaceous cyst [L72.3]          PAST MEDICAL HISTORY  Past Medical History:   Diagnosis Date   • Acute sinusitis    • Breast cancer (HCC) 2002, 2007    Lumpectomy with radiation and chemotherapy on the left followed by bilateral mastectomy 2007   • Chest pain, unspecified    • Condition not found     LOCALIZED ENLARGED LYMPH NODES   • Condition not found 05/01/2017    ULTRASOUND ABD   NEG , GB POLYPS   • Cystitis, unspecified without hematuria    •  Environmental allergies    • Essential (primary) hypertension    • Fatty (change of) liver, not elsewhere classified    • Fatty liver    • Fluttering heart    • Gallstones    • GERD (gastroesophageal reflux disease)    • Headache    • High cholesterol    • History of concussion 02/09/2020   • History of kidney stones 1985   • Hx of bone density study    • Hx of CT scan of chest 08/01/2020    NEG   • Hx of mammogram    • Hyperlipidemia    • Insomnia, unspecified    • Low back pain    • Multiple bruises     ARMS, LEGS    • Multiple skin tears     ARMS AND LEGS   • Osteoarthritis     hips and knees, spine   • Osteoporosis    • Other diseases of pharynx    • Other fatigue    • Other nonthrombocytopenic purpura (HCC)    • Pain in unspecified joint    • PONV (postoperative nausea and vomiting)    • Right knee pain    • Thin skin    • Unspecified abdominal pain    • UTI (urinary tract infection)     SITE NOT SPECIFIED   • Wound of right leg     INSTRUCTED TO LET SURGEON KNOW IF THIS IS NOT HEALED WITHIN A COUPLE DAYS OF PAT         SURGICAL HISTORY  Past Surgical History:   Procedure Laterality Date   • BACK SURGERY  2004   • BREAST LUMPECTOMY Left 2002   • CATARACT EXTRACTION W/ INTRAOCULAR LENS  IMPLANT, BILATERAL     • COLONOSCOPY     • COMBINED HYSTERECTOMY VAGINAL / OOPHORECTOMY / A & P REPAIR  2002   • MASTECTOMY Bilateral 2007   • MRI INTRAOPERATIVE BRAIN W/ OR W/O CONTRAST  12/2016    ESSENTIALLY NEG   • NECK SURGERY  2006   • NECK SURGERY     • TOTAL KNEE ARTHROPLASTY Right 8/28/2020    Procedure: TOTAL KNEE ARTHROPLASTY;  Surgeon: Dean Trinidad MD;  Location: [facility];  Service: Orthopedics;  Laterality: Right;         FAMILY HISTORY  Family History   Problem Relation Age of Onset   • No Known Problems Mother    • Hypertension Father    • Aneurysm Father    • Heart disease Father    • Other Father         mesothelioma   • Cancer Brother         colon, liver   • Malig Hyperthermia Neg Hx      Her sister was  just diagnosed with bile duct cancer    SOCIAL HISTORY  Social History     Occupational History   • Not on file   Tobacco Use   • Smoking status: Former Smoker     Types: Cigarettes     Quit date:      Years since quittin.9   • Smokeless tobacco: Never Used   • Tobacco comment: SOCIAL, MAYBE 1 PACK PER WEEK PRACTICE FUSION SAYS NO SMOKING   Vaping Use   • Vaping Use: Never used   Substance and Sexual Activity   • Alcohol use: Yes     Alcohol/week: 7.0 standard drinks     Types: 7 Glasses of wine per week     Comment: 7 days per week   • Drug use: No   • Sexual activity: Defer         CURRENT MEDICATIONS    Current Outpatient Medications:   •  B Complex Vitamins (B-COMPLEX/B-12) tablet, Take 1 tablet by mouth Daily., Disp: , Rfl:   •  Calcium Carb-Cholecalciferol (CALCIUM 600+D3) 600-200 MG-UNIT tablet, Take 1 tablet by mouth Daily., Disp: , Rfl:   •  cetirizine (zyrTEC) 10 MG tablet, Take 10 mg by mouth Daily., Disp: , Rfl:   •  Cetirizine HCl (ZyrTEC Allergy) 10 MG capsule, , Disp: , Rfl:   •  Cholecalciferol (VITAMIN D3) 2000 UNITS capsule, Take 2,000 Units by mouth daily., Disp: , Rfl:   •  esomeprazole (NEXIUM) 40 MG capsule, Take 40 mg by mouth Every Morning Before Breakfast., Disp: , Rfl:   •  meloxicam (MOBIC) 15 MG tablet, TAKE 1 TABLET(15 MG) BY MOUTH DAILY AS NEEDED FOR PAIN, Disp: 90 tablet, Rfl: 0  •  metoprolol tartrate (LOPRESSOR) 25 MG tablet, Take 25 mg by mouth 2 (Two) Times a Day., Disp: , Rfl: 0  •  Multiple Vitamins-Minerals (CENTRUM SILVER 50+WOMEN PO), Take 1 tablet by mouth Daily., Disp: , Rfl:   •  Probiotic Product (ALIGN PO), Take 1 tablet by mouth Daily., Disp: , Rfl:   •  rosuvastatin (CRESTOR) 10 MG tablet, Take 10 mg by mouth Daily., Disp: , Rfl: 4  •  traMADol (ULTRAM) 50 MG tablet, Take 1 tablet by mouth Every 8 (Eight) Hours As Needed for Moderate Pain  for up to 3 days., Disp: 9 tablet, Rfl: 0  •  Turmeric 500 MG capsule, Take 1 tablet by mouth Daily., Disp: , Rfl:   •   "zoledronic acid (RECLAST) 5 MG/100ML solution infusion, Infuse 5 mg into a venous catheter 1 (One) Time., Disp: , Rfl:   No current facility-administered medications for this visit.    Facility-Administered Medications Ordered in Other Visits:   •  Chlorhexidine Gluconate 2 % pads 1 each, 1 each, Apply externally, Take As Directed, Dean Trinidad MD    ALLERGIES  Propoxyphene, Codeine, and Percocet [oxycodone-acetaminophen]    VITALS  Vitals:    12/15/21 1437   BP: 120/80   Weight: 69.9 kg (154 lb)   Height: 165.1 cm (65\")       LAST RESULTS   Admission on 12/14/2021, Discharged on 12/14/2021   Component Date Value Ref Range Status   • Glucose 12/14/2021 139* 65 - 99 mg/dL Final   • BUN 12/14/2021 19  8 - 23 mg/dL Final   • Creatinine 12/14/2021 0.84  0.57 - 1.00 mg/dL Final   • Sodium 12/14/2021 137  136 - 145 mmol/L Final   • Potassium 12/14/2021 4.3  3.5 - 5.2 mmol/L Final   • Chloride 12/14/2021 102  98 - 107 mmol/L Final   • CO2 12/14/2021 23.7  22.0 - 29.0 mmol/L Final   • Calcium 12/14/2021 9.7  8.6 - 10.5 mg/dL Final   • Total Protein 12/14/2021 7.0  6.0 - 8.5 g/dL Final   • Albumin 12/14/2021 4.40  3.50 - 5.20 g/dL Final   • ALT (SGPT) 12/14/2021 31  1 - 33 U/L Final   • AST (SGOT) 12/14/2021 39* 1 - 32 U/L Final   • Alkaline Phosphatase 12/14/2021 74  39 - 117 U/L Final   • Total Bilirubin 12/14/2021 0.7  0.0 - 1.2 mg/dL Final   • eGFR Non  Amer 12/14/2021 67  >60 mL/min/1.73 Final   • Globulin 12/14/2021 2.6  gm/dL Final   • A/G Ratio 12/14/2021 1.7  g/dL Final   • BUN/Creatinine Ratio 12/14/2021 22.6  7.0 - 25.0 Final   • Anion Gap 12/14/2021 11.3  5.0 - 15.0 mmol/L Final   • Color, UA 12/14/2021 Yellow  Yellow, Straw Final   • Appearance, UA 12/14/2021 Clear  Clear Final   • pH, UA 12/14/2021 6.5  4.5 - 8.0 Final   • Specific Gravity, UA 12/14/2021 1.015  1.003 - 1.030 Final   • Glucose, UA 12/14/2021 Negative  Negative Final   • Ketones, UA 12/14/2021 Negative  Negative Final   • Bilirubin, UA " 12/14/2021 Negative  Negative Final   • Blood, UA 12/14/2021 Negative  Negative Final   • Protein, UA 12/14/2021 Negative  Negative Final   • Leuk Esterase, UA 12/14/2021 Trace* Negative Final   • Nitrite, UA 12/14/2021 Negative  Negative Final   • Urobilinogen, UA 12/14/2021 0.2 E.U./dL  0.2 - 1.0 E.U./dL Final   • WBC 12/14/2021 5.65  3.40 - 10.80 10*3/mm3 Final   • RBC 12/14/2021 5.15  3.77 - 5.28 10*6/mm3 Final   • Hemoglobin 12/14/2021 15.1  12.0 - 15.9 g/dL Final   • Hematocrit 12/14/2021 47.9* 34.0 - 46.6 % Final   • MCV 12/14/2021 93.0  79.0 - 97.0 fL Final   • MCH 12/14/2021 29.3  26.6 - 33.0 pg Final   • MCHC 12/14/2021 31.5  31.5 - 35.7 g/dL Final   • RDW 12/14/2021 14.3  12.3 - 15.4 % Final   • RDW-SD 12/14/2021 49.5  37.0 - 54.0 fl Final   • MPV 12/14/2021 10.6  6.0 - 12.0 fL Final   • Platelets 12/14/2021 228  140 - 450 10*3/mm3 Final   • Neutrophil % 12/14/2021 53.3  42.7 - 76.0 % Final   • Lymphocyte % 12/14/2021 34.3  19.6 - 45.3 % Final   • Monocyte % 12/14/2021 10.8  5.0 - 12.0 % Final   • Eosinophil % 12/14/2021 1.2  0.3 - 6.2 % Final   • Basophil % 12/14/2021 0.4  0.0 - 1.5 % Final   • Immature Grans % 12/14/2021 0.0  0.0 - 0.5 % Final   • Neutrophils, Absolute 12/14/2021 3.01  1.70 - 7.00 10*3/mm3 Final   • Lymphocytes, Absolute 12/14/2021 1.94  0.70 - 3.10 10*3/mm3 Final   • Monocytes, Absolute 12/14/2021 0.61  0.10 - 0.90 10*3/mm3 Final   • Eosinophils, Absolute 12/14/2021 0.07  0.00 - 0.40 10*3/mm3 Final   • Basophils, Absolute 12/14/2021 0.02  0.00 - 0.20 10*3/mm3 Final   • Immature Grans, Absolute 12/14/2021 0.00  0.00 - 0.05 10*3/mm3 Final   • Lipase 12/14/2021 43  13 - 60 U/L Final   • RBC, UA 12/14/2021 None Seen  None Seen /HPF Final   • WBC, UA 12/14/2021 0-2* None Seen /HPF Final   • Bacteria, UA 12/14/2021 None Seen  None Seen /HPF Final   • Squamous Epithelial Cells, UA 12/14/2021 3-6* None Seen, 0-2 /HPF Final   • Hyaline Casts, UA 12/14/2021 None Seen  None Seen /LPF Final   •  Methodology 12/14/2021 Manual Light Microscopy   Final     CT Abdomen Pelvis With Contrast    Result Date: 12/14/2021  Narrative: CT Abdomen Pelvis W INDICATION: 71-year-old emergency department patient with complaint of right upper quadrant abdominal pain beginning this morning. TECHNIQUE: CT of the abdomen and pelvis with Isovue-370-100 cc IV contrast. Coronal and sagittal reconstructions were obtained.  Radiation dose reduction techniques included automated exposure control or exposure modulation based on body size. Count of known CT and cardiac nuc med studies performed in previous 12 months: 0. COMPARISON: CT chest 8/18/2020, ultrasound gallbladder 4/28/2014, PET CT 10/17/2007 FINDINGS: Abdomen: The included images through the lung bases are clear. There are no effusions. The distal esophagus is normal The liver is normal in size but slightly low in attenuation which may indicate underlying steatosis. There is no focal liver lesion The gallbladder is well distended. There are gallstones in the gallbladder with areas of minimal fine nodular thickening of the gallbladder wall which could represent tiny polyps. The common bile duct, pancreas and pancreatic duct are normal. Numerous calcified granulomata in the spleen without splenomegaly. The adrenal glands are normal Both kidneys enhance normally. There is no mass, obstruction. There appear to be 2 tiny nonobstructing intrarenal stones in the left kidney. No right renal stone. No ureterectasis or ureteral calculus. The stomach and small bowel appear normal. There is moderate stool in the colon with multiple diverticula of the distal descending colon and sigmoid colon. There is suggested wall thickening at the junction of descending colon and sigmoid colon with very minimal adjacent groundglass change which could represent changes of diverticulitis. No abscess or perforation seen. Pelvis: The bladder is normal. The uterus is absent. There is no adnexal mass. Bone  window images demonstrate degenerative disc disease at L5-S1. There is prior surgical change of left total hip replacement.     Impression: 1. Diverticulosis of the colon with questionable wall thickening of the junction of descending colon and sigmoid colon which may indicate mild diverticulitis. Suggest treatment for diverticulitis with follow-up short interval colonoscopy if not recently performed 2. Distended gallbladder with cholelithiasis and focal small nodules in the gallbladder wall favored to represent polyps. No definite gallbladder wall thickening or pericholecystic fluid. 3. There are 2 small intrarenal nonobstructing stones on the left. No ureterectasis or ureteral calculus 4. Degenerative disc disease at L5-S1. Signer Name: Lyudmila Kelley MD  Signed: 12/14/2021 2:25 PM  Workstation Name: [id number]  [facility]      PHYSICAL EXAM  Debilities/Disabilities Identified: None  Emotional Behavior: Appropriate  Physical Exam alert elderly white female in no active distress she does not have any clinical jaundice her heart shows a regular rate and rhythm her lungs are clear and equal. Her abdomen shows mild epigastric and right upper quadrant tenderness without mass. There is no flank tenderness. Reviewed her ER records from yesterday. She saw Dr. Moseley. Her CT scan was reviewed by myself and showed cholelithiasis without any surrounding inflammation. Her bilirubin was normal her SGOT was just mildly elevated her white blood count was normal.    ASSESSMENT  Cholelithiasis      PLAN  The risk benefits and options were discussed with the patient and her  in detail. He wants to proceed with a laparoscopic cholecystectomy. She is traveling to [place] as soon as she can. I recommended that she at least hold off for 2 weeks after surgery. She would need to stop her meloxicam and we can do her surgery next Wednesday. I did give her her the option of going to [place] and having her  surgery there because she will be there till April. She deferred on that option.

## 2021-12-15 NOTE — H&P (VIEW-ONLY)
PATIENT INFORMATION  Prema Hoang       - 1950    CHIEF COMPLAINT  Chief Complaint   Patient presents with   • Abdominal Pain       HISTORY OF PRESENT ILLNESS  HPI she complains of episodic epigastric and right upper quadrant pain. She had an episode yesterday requiring an ER visit here. She had some nausea but no vomiting she denies any fever she said some chills last evening. She denies any jaundice type symptoms. She had similar symptoms a week ago. Of note she wants to leave soon to go to [facility] and will be there till April.        REVIEW OF SYSTEMS  Review of Systems   Constitutional: Negative for activity change, chills, fever and unexpected weight change.   HENT: Negative for congestion.    Eyes: Negative for visual disturbance.   Respiratory: Negative for shortness of breath.    Cardiovascular: Negative for chest pain and palpitations.   Gastrointestinal: Positive for abdominal pain. Negative for blood in stool.   Endocrine: Negative for cold intolerance and heat intolerance.   Genitourinary: Negative for hematuria.   Musculoskeletal: Negative for gait problem.   Skin: Negative for color change.   Allergic/Immunologic: Negative for immunocompromised state.   Neurological: Negative for weakness and light-headedness.   Hematological: Negative for adenopathy.   Psychiatric/Behavioral: Negative for sleep disturbance. The patient is not nervous/anxious.          ACTIVE PROBLEMS  Patient Active Problem List    Diagnosis    • Essential (primary) hypertension [I10]    • Primary osteoarthritis of left knee [M17.12]    • Status post total right knee replacement [Z96.651]    • Senile osteoporosis [M81.0]    • Abnormal EKG [R94.31]    • Vomiting [R11.10]    • Primary osteoarthritis of right knee [M17.11]    • Rotator cuff arthropathy of both shoulders [M12.811, M12.812]    • Chronic pain of both knees [M25.561, M25.562, G89.29]    • Polyarthralgia [M25.50]    • Nontraumatic subluxation of extensor  tendon at metacarpophalangeal joint of right hand [M66.241]    • Bilateral primary osteoarthritis of knee [M17.0]    • Bilateral shoulder pain [M25.511, M25.512]    • H/O bilateral mastectomy [Z90.13]    • Chronic back pain [M54.9, G89.29]    • Rotator cuff tear arthropathy of both shoulders [M75.101, M12.811, M12.812, M75.102]    • Chronic pain of both shoulders [M25.511, G89.29, M25.512]    • Bilateral chronic knee pain [M25.561, M25.562, G89.29]    • Right upper quadrant pain [R10.11]    • Abnormal liver enzymes [R74.8]    • Abnormal magnetic resonance imaging study [R93.89]    • Arthritis [M19.90]    • Hereditary essential tremor [G25.0]    • Gastroesophageal reflux disease without esophagitis [K21.9]    • Headache [R51.9]    • Hyperlipidemia [E78.5]    • Impaired fasting glucose [R73.01]    • Osteoarthritis [M19.90]    • Osteoporosis [M81.0]    • Nonalcoholic steatohepatitis (BYRD) [K75.81]    • Shoulder impingement [M75.40]    • Trigger ring finger of right hand [M65.341]    • AC (acromioclavicular) arthritis [M19.019]    • Hx of fusion of cervical spine [Z98.1]    • Post-operative state [Z98.890]    • Acquired absence of genital organ [Z90.79]    • Asymptomatic postmenopausal status [Z78.0]    • Disorder of bone and cartilage [M89.9, M94.9]    • Hx antineoplastic chemotherapy [Z92.21]    • Other postprocedural status(V45.89) [Z98.89]    • Personal history of malignant neoplasm of breast [Z85.3]    • Prophylactic use of aromatase inhibitors [Z79.811]    • Sebaceous cyst [L72.3]          PAST MEDICAL HISTORY  Past Medical History:   Diagnosis Date   • Acute sinusitis    • Breast cancer (HCC) 2002, 2007    Lumpectomy with radiation and chemotherapy on the left followed by bilateral mastectomy 2007   • Chest pain, unspecified    • Condition not found     LOCALIZED ENLARGED LYMPH NODES   • Condition not found 05/01/2017    ULTRASOUND ABD   NEG , GB POLYPS   • Cystitis, unspecified without hematuria    •  Environmental allergies    • Essential (primary) hypertension    • Fatty (change of) liver, not elsewhere classified    • Fatty liver    • Fluttering heart    • Gallstones    • GERD (gastroesophageal reflux disease)    • Headache    • High cholesterol    • History of concussion 02/09/2020   • History of kidney stones 1985   • Hx of bone density study    • Hx of CT scan of chest 08/01/2020    NEG   • Hx of mammogram    • Hyperlipidemia    • Insomnia, unspecified    • Low back pain    • Multiple bruises     ARMS, LEGS    • Multiple skin tears     ARMS AND LEGS   • Osteoarthritis     hips and knees, spine   • Osteoporosis    • Other diseases of pharynx    • Other fatigue    • Other nonthrombocytopenic purpura (HCC)    • Pain in unspecified joint    • PONV (postoperative nausea and vomiting)    • Right knee pain    • Thin skin    • Unspecified abdominal pain    • UTI (urinary tract infection)     SITE NOT SPECIFIED   • Wound of right leg     INSTRUCTED TO LET SURGEON KNOW IF THIS IS NOT HEALED WITHIN A COUPLE DAYS OF PAT         SURGICAL HISTORY  Past Surgical History:   Procedure Laterality Date   • BACK SURGERY  2004   • BREAST LUMPECTOMY Left 2002   • CATARACT EXTRACTION W/ INTRAOCULAR LENS  IMPLANT, BILATERAL     • COLONOSCOPY     • COMBINED HYSTERECTOMY VAGINAL / OOPHORECTOMY / A & P REPAIR  2002   • MASTECTOMY Bilateral 2007   • MRI INTRAOPERATIVE BRAIN W/ OR W/O CONTRAST  12/2016    ESSENTIALLY NEG   • NECK SURGERY  2006   • NECK SURGERY     • TOTAL KNEE ARTHROPLASTY Right 8/28/2020    Procedure: TOTAL KNEE ARTHROPLASTY;  Surgeon: Dean Trinidad MD;  Location: [facility];  Service: Orthopedics;  Laterality: Right;         FAMILY HISTORY  Family History   Problem Relation Age of Onset   • No Known Problems Mother    • Hypertension Father    • Aneurysm Father    • Heart disease Father    • Other Father         mesothelioma   • Cancer Brother         colon, liver   • Malig Hyperthermia Neg Hx      Her sister was  just diagnosed with bile duct cancer    SOCIAL HISTORY  Social History     Occupational History   • Not on file   Tobacco Use   • Smoking status: Former Smoker     Types: Cigarettes     Quit date:      Years since quittin.9   • Smokeless tobacco: Never Used   • Tobacco comment: SOCIAL, MAYBE 1 PACK PER WEEK PRACTICE FUSION SAYS NO SMOKING   Vaping Use   • Vaping Use: Never used   Substance and Sexual Activity   • Alcohol use: Yes     Alcohol/week: 7.0 standard drinks     Types: 7 Glasses of wine per week     Comment: 7 days per week   • Drug use: No   • Sexual activity: Defer         CURRENT MEDICATIONS    Current Outpatient Medications:   •  B Complex Vitamins (B-COMPLEX/B-12) tablet, Take 1 tablet by mouth Daily., Disp: , Rfl:   •  Calcium Carb-Cholecalciferol (CALCIUM 600+D3) 600-200 MG-UNIT tablet, Take 1 tablet by mouth Daily., Disp: , Rfl:   •  cetirizine (zyrTEC) 10 MG tablet, Take 10 mg by mouth Daily., Disp: , Rfl:   •  Cetirizine HCl (ZyrTEC Allergy) 10 MG capsule, , Disp: , Rfl:   •  Cholecalciferol (VITAMIN D3) 2000 UNITS capsule, Take 2,000 Units by mouth daily., Disp: , Rfl:   •  esomeprazole (NEXIUM) 40 MG capsule, Take 40 mg by mouth Every Morning Before Breakfast., Disp: , Rfl:   •  meloxicam (MOBIC) 15 MG tablet, TAKE 1 TABLET(15 MG) BY MOUTH DAILY AS NEEDED FOR PAIN, Disp: 90 tablet, Rfl: 0  •  metoprolol tartrate (LOPRESSOR) 25 MG tablet, Take 25 mg by mouth 2 (Two) Times a Day., Disp: , Rfl: 0  •  Multiple Vitamins-Minerals (CENTRUM SILVER 50+WOMEN PO), Take 1 tablet by mouth Daily., Disp: , Rfl:   •  Probiotic Product (ALIGN PO), Take 1 tablet by mouth Daily., Disp: , Rfl:   •  rosuvastatin (CRESTOR) 10 MG tablet, Take 10 mg by mouth Daily., Disp: , Rfl: 4  •  traMADol (ULTRAM) 50 MG tablet, Take 1 tablet by mouth Every 8 (Eight) Hours As Needed for Moderate Pain  for up to 3 days., Disp: 9 tablet, Rfl: 0  •  Turmeric 500 MG capsule, Take 1 tablet by mouth Daily., Disp: , Rfl:   •   "zoledronic acid (RECLAST) 5 MG/100ML solution infusion, Infuse 5 mg into a venous catheter 1 (One) Time., Disp: , Rfl:   No current facility-administered medications for this visit.    Facility-Administered Medications Ordered in Other Visits:   •  Chlorhexidine Gluconate 2 % pads 1 each, 1 each, Apply externally, Take As Directed, Dean Trinidad MD    ALLERGIES  Propoxyphene, Codeine, and Percocet [oxycodone-acetaminophen]    VITALS  Vitals:    12/15/21 1437   BP: 120/80   Weight: 69.9 kg (154 lb)   Height: 165.1 cm (65\")       LAST RESULTS   Admission on 12/14/2021, Discharged on 12/14/2021   Component Date Value Ref Range Status   • Glucose 12/14/2021 139* 65 - 99 mg/dL Final   • BUN 12/14/2021 19  8 - 23 mg/dL Final   • Creatinine 12/14/2021 0.84  0.57 - 1.00 mg/dL Final   • Sodium 12/14/2021 137  136 - 145 mmol/L Final   • Potassium 12/14/2021 4.3  3.5 - 5.2 mmol/L Final   • Chloride 12/14/2021 102  98 - 107 mmol/L Final   • CO2 12/14/2021 23.7  22.0 - 29.0 mmol/L Final   • Calcium 12/14/2021 9.7  8.6 - 10.5 mg/dL Final   • Total Protein 12/14/2021 7.0  6.0 - 8.5 g/dL Final   • Albumin 12/14/2021 4.40  3.50 - 5.20 g/dL Final   • ALT (SGPT) 12/14/2021 31  1 - 33 U/L Final   • AST (SGOT) 12/14/2021 39* 1 - 32 U/L Final   • Alkaline Phosphatase 12/14/2021 74  39 - 117 U/L Final   • Total Bilirubin 12/14/2021 0.7  0.0 - 1.2 mg/dL Final   • eGFR Non  Amer 12/14/2021 67  >60 mL/min/1.73 Final   • Globulin 12/14/2021 2.6  gm/dL Final   • A/G Ratio 12/14/2021 1.7  g/dL Final   • BUN/Creatinine Ratio 12/14/2021 22.6  7.0 - 25.0 Final   • Anion Gap 12/14/2021 11.3  5.0 - 15.0 mmol/L Final   • Color, UA 12/14/2021 Yellow  Yellow, Straw Final   • Appearance, UA 12/14/2021 Clear  Clear Final   • pH, UA 12/14/2021 6.5  4.5 - 8.0 Final   • Specific Gravity, UA 12/14/2021 1.015  1.003 - 1.030 Final   • Glucose, UA 12/14/2021 Negative  Negative Final   • Ketones, UA 12/14/2021 Negative  Negative Final   • Bilirubin, UA " 12/14/2021 Negative  Negative Final   • Blood, UA 12/14/2021 Negative  Negative Final   • Protein, UA 12/14/2021 Negative  Negative Final   • Leuk Esterase, UA 12/14/2021 Trace* Negative Final   • Nitrite, UA 12/14/2021 Negative  Negative Final   • Urobilinogen, UA 12/14/2021 0.2 E.U./dL  0.2 - 1.0 E.U./dL Final   • WBC 12/14/2021 5.65  3.40 - 10.80 10*3/mm3 Final   • RBC 12/14/2021 5.15  3.77 - 5.28 10*6/mm3 Final   • Hemoglobin 12/14/2021 15.1  12.0 - 15.9 g/dL Final   • Hematocrit 12/14/2021 47.9* 34.0 - 46.6 % Final   • MCV 12/14/2021 93.0  79.0 - 97.0 fL Final   • MCH 12/14/2021 29.3  26.6 - 33.0 pg Final   • MCHC 12/14/2021 31.5  31.5 - 35.7 g/dL Final   • RDW 12/14/2021 14.3  12.3 - 15.4 % Final   • RDW-SD 12/14/2021 49.5  37.0 - 54.0 fl Final   • MPV 12/14/2021 10.6  6.0 - 12.0 fL Final   • Platelets 12/14/2021 228  140 - 450 10*3/mm3 Final   • Neutrophil % 12/14/2021 53.3  42.7 - 76.0 % Final   • Lymphocyte % 12/14/2021 34.3  19.6 - 45.3 % Final   • Monocyte % 12/14/2021 10.8  5.0 - 12.0 % Final   • Eosinophil % 12/14/2021 1.2  0.3 - 6.2 % Final   • Basophil % 12/14/2021 0.4  0.0 - 1.5 % Final   • Immature Grans % 12/14/2021 0.0  0.0 - 0.5 % Final   • Neutrophils, Absolute 12/14/2021 3.01  1.70 - 7.00 10*3/mm3 Final   • Lymphocytes, Absolute 12/14/2021 1.94  0.70 - 3.10 10*3/mm3 Final   • Monocytes, Absolute 12/14/2021 0.61  0.10 - 0.90 10*3/mm3 Final   • Eosinophils, Absolute 12/14/2021 0.07  0.00 - 0.40 10*3/mm3 Final   • Basophils, Absolute 12/14/2021 0.02  0.00 - 0.20 10*3/mm3 Final   • Immature Grans, Absolute 12/14/2021 0.00  0.00 - 0.05 10*3/mm3 Final   • Lipase 12/14/2021 43  13 - 60 U/L Final   • RBC, UA 12/14/2021 None Seen  None Seen /HPF Final   • WBC, UA 12/14/2021 0-2* None Seen /HPF Final   • Bacteria, UA 12/14/2021 None Seen  None Seen /HPF Final   • Squamous Epithelial Cells, UA 12/14/2021 3-6* None Seen, 0-2 /HPF Final   • Hyaline Casts, UA 12/14/2021 None Seen  None Seen /LPF Final   •  Methodology 12/14/2021 Manual Light Microscopy   Final     CT Abdomen Pelvis With Contrast    Result Date: 12/14/2021  Narrative: CT Abdomen Pelvis W INDICATION: 71-year-old emergency department patient with complaint of right upper quadrant abdominal pain beginning this morning. TECHNIQUE: CT of the abdomen and pelvis with Isovue-370-100 cc IV contrast. Coronal and sagittal reconstructions were obtained.  Radiation dose reduction techniques included automated exposure control or exposure modulation based on body size. Count of known CT and cardiac nuc med studies performed in previous 12 months: 0. COMPARISON: CT chest 8/18/2020, ultrasound gallbladder 4/28/2014, PET CT 10/17/2007 FINDINGS: Abdomen: The included images through the lung bases are clear. There are no effusions. The distal esophagus is normal The liver is normal in size but slightly low in attenuation which may indicate underlying steatosis. There is no focal liver lesion The gallbladder is well distended. There are gallstones in the gallbladder with areas of minimal fine nodular thickening of the gallbladder wall which could represent tiny polyps. The common bile duct, pancreas and pancreatic duct are normal. Numerous calcified granulomata in the spleen without splenomegaly. The adrenal glands are normal Both kidneys enhance normally. There is no mass, obstruction. There appear to be 2 tiny nonobstructing intrarenal stones in the left kidney. No right renal stone. No ureterectasis or ureteral calculus. The stomach and small bowel appear normal. There is moderate stool in the colon with multiple diverticula of the distal descending colon and sigmoid colon. There is suggested wall thickening at the junction of descending colon and sigmoid colon with very minimal adjacent groundglass change which could represent changes of diverticulitis. No abscess or perforation seen. Pelvis: The bladder is normal. The uterus is absent. There is no adnexal mass. Bone  window images demonstrate degenerative disc disease at L5-S1. There is prior surgical change of left total hip replacement.     Impression: 1. Diverticulosis of the colon with questionable wall thickening of the junction of descending colon and sigmoid colon which may indicate mild diverticulitis. Suggest treatment for diverticulitis with follow-up short interval colonoscopy if not recently performed 2. Distended gallbladder with cholelithiasis and focal small nodules in the gallbladder wall favored to represent polyps. No definite gallbladder wall thickening or pericholecystic fluid. 3. There are 2 small intrarenal nonobstructing stones on the left. No ureterectasis or ureteral calculus 4. Degenerative disc disease at L5-S1. Signer Name: Lyudmila Kelley MD  Signed: 12/14/2021 2:25 PM  Workstation Name: [id number]  [facility]      PHYSICAL EXAM  Debilities/Disabilities Identified: None  Emotional Behavior: Appropriate  Physical Exam alert elderly white female in no active distress she does not have any clinical jaundice her heart shows a regular rate and rhythm her lungs are clear and equal. Her abdomen shows mild epigastric and right upper quadrant tenderness without mass. There is no flank tenderness. Reviewed her ER records from yesterday. She saw Dr. Moseley. Her CT scan was reviewed by myself and showed cholelithiasis without any surrounding inflammation. Her bilirubin was normal her SGOT was just mildly elevated her white blood count was normal.    ASSESSMENT  Cholelithiasis      PLAN  The risk benefits and options were discussed with the patient and her  in detail. He wants to proceed with a laparoscopic cholecystectomy. She is traveling to [place] as soon as she can. I recommended that she at least hold off for 2 weeks after surgery. She would need to stop her meloxicam and we can do her surgery next Wednesday. I did give her her the option of going to [place] and having her  surgery there because she will be there till April. She deferred on that option.

## 2021-12-15 NOTE — PROGRESS NOTES
PATIENT INFORMATION  Prema Hoang       - 1950    CHIEF COMPLAINT  Chief Complaint   Patient presents with   • Abdominal Pain       HISTORY OF PRESENT ILLNESS  HPI she complains of episodic epigastric and right upper quadrant pain. She had an episode yesterday requiring an ER visit here. She had some nausea but no vomiting she denies any fever she said some chills last evening. She denies any jaundice type symptoms. She had similar symptoms a week ago. Of note she wants to leave soon to go to [facility] and will be there till April.        REVIEW OF SYSTEMS  Review of Systems   Constitutional: Negative for activity change, chills, fever and unexpected weight change.   HENT: Negative for congestion.    Eyes: Negative for visual disturbance.   Respiratory: Negative for shortness of breath.    Cardiovascular: Negative for chest pain and palpitations.   Gastrointestinal: Positive for abdominal pain. Negative for blood in stool.   Endocrine: Negative for cold intolerance and heat intolerance.   Genitourinary: Negative for hematuria.   Musculoskeletal: Negative for gait problem.   Skin: Negative for color change.   Allergic/Immunologic: Negative for immunocompromised state.   Neurological: Negative for weakness and light-headedness.   Hematological: Negative for adenopathy.   Psychiatric/Behavioral: Negative for sleep disturbance. The patient is not nervous/anxious.          ACTIVE PROBLEMS  Patient Active Problem List    Diagnosis    • Essential (primary) hypertension [I10]    • Primary osteoarthritis of left knee [M17.12]    • Status post total right knee replacement [Z96.651]    • Senile osteoporosis [M81.0]    • Abnormal EKG [R94.31]    • Vomiting [R11.10]    • Primary osteoarthritis of right knee [M17.11]    • Rotator cuff arthropathy of both shoulders [M12.811, M12.812]    • Chronic pain of both knees [M25.561, M25.562, G89.29]    • Polyarthralgia [M25.50]    • Nontraumatic subluxation of extensor  tendon at metacarpophalangeal joint of right hand [M66.241]    • Bilateral primary osteoarthritis of knee [M17.0]    • Bilateral shoulder pain [M25.511, M25.512]    • H/O bilateral mastectomy [Z90.13]    • Chronic back pain [M54.9, G89.29]    • Rotator cuff tear arthropathy of both shoulders [M75.101, M12.811, M12.812, M75.102]    • Chronic pain of both shoulders [M25.511, G89.29, M25.512]    • Bilateral chronic knee pain [M25.561, M25.562, G89.29]    • Right upper quadrant pain [R10.11]    • Abnormal liver enzymes [R74.8]    • Abnormal magnetic resonance imaging study [R93.89]    • Arthritis [M19.90]    • Hereditary essential tremor [G25.0]    • Gastroesophageal reflux disease without esophagitis [K21.9]    • Headache [R51.9]    • Hyperlipidemia [E78.5]    • Impaired fasting glucose [R73.01]    • Osteoarthritis [M19.90]    • Osteoporosis [M81.0]    • Nonalcoholic steatohepatitis (BYRD) [K75.81]    • Shoulder impingement [M75.40]    • Trigger ring finger of right hand [M65.341]    • AC (acromioclavicular) arthritis [M19.019]    • Hx of fusion of cervical spine [Z98.1]    • Post-operative state [Z98.890]    • Acquired absence of genital organ [Z90.79]    • Asymptomatic postmenopausal status [Z78.0]    • Disorder of bone and cartilage [M89.9, M94.9]    • Hx antineoplastic chemotherapy [Z92.21]    • Other postprocedural status(V45.89) [Z98.89]    • Personal history of malignant neoplasm of breast [Z85.3]    • Prophylactic use of aromatase inhibitors [Z79.811]    • Sebaceous cyst [L72.3]          PAST MEDICAL HISTORY  Past Medical History:   Diagnosis Date   • Acute sinusitis    • Breast cancer (HCC) 2002, 2007    Lumpectomy with radiation and chemotherapy on the left followed by bilateral mastectomy 2007   • Chest pain, unspecified    • Condition not found     LOCALIZED ENLARGED LYMPH NODES   • Condition not found 05/01/2017    ULTRASOUND ABD   NEG , GB POLYPS   • Cystitis, unspecified without hematuria    •  Environmental allergies    • Essential (primary) hypertension    • Fatty (change of) liver, not elsewhere classified    • Fatty liver    • Fluttering heart    • Gallstones    • GERD (gastroesophageal reflux disease)    • Headache    • High cholesterol    • History of concussion 02/09/2020   • History of kidney stones 1985   • Hx of bone density study    • Hx of CT scan of chest 08/01/2020    NEG   • Hx of mammogram    • Hyperlipidemia    • Insomnia, unspecified    • Low back pain    • Multiple bruises     ARMS, LEGS    • Multiple skin tears     ARMS AND LEGS   • Osteoarthritis     hips and knees, spine   • Osteoporosis    • Other diseases of pharynx    • Other fatigue    • Other nonthrombocytopenic purpura (HCC)    • Pain in unspecified joint    • PONV (postoperative nausea and vomiting)    • Right knee pain    • Thin skin    • Unspecified abdominal pain    • UTI (urinary tract infection)     SITE NOT SPECIFIED   • Wound of right leg     INSTRUCTED TO LET SURGEON KNOW IF THIS IS NOT HEALED WITHIN A COUPLE DAYS OF PAT         SURGICAL HISTORY  Past Surgical History:   Procedure Laterality Date   • BACK SURGERY  2004   • BREAST LUMPECTOMY Left 2002   • CATARACT EXTRACTION W/ INTRAOCULAR LENS  IMPLANT, BILATERAL     • COLONOSCOPY     • COMBINED HYSTERECTOMY VAGINAL / OOPHORECTOMY / A & P REPAIR  2002   • MASTECTOMY Bilateral 2007   • MRI INTRAOPERATIVE BRAIN W/ OR W/O CONTRAST  12/2016    ESSENTIALLY NEG   • NECK SURGERY  2006   • NECK SURGERY     • TOTAL KNEE ARTHROPLASTY Right 8/28/2020    Procedure: TOTAL KNEE ARTHROPLASTY;  Surgeon: Dean Trinidad MD;  Location: [facility];  Service: Orthopedics;  Laterality: Right;         FAMILY HISTORY  Family History   Problem Relation Age of Onset   • No Known Problems Mother    • Hypertension Father    • Aneurysm Father    • Heart disease Father    • Other Father         mesothelioma   • Cancer Brother         colon, liver   • Malig Hyperthermia Neg Hx      Her sister was  just diagnosed with bile duct cancer    SOCIAL HISTORY  Social History     Occupational History   • Not on file   Tobacco Use   • Smoking status: Former Smoker     Types: Cigarettes     Quit date:      Years since quittin.9   • Smokeless tobacco: Never Used   • Tobacco comment: SOCIAL, MAYBE 1 PACK PER WEEK PRACTICE FUSION SAYS NO SMOKING   Vaping Use   • Vaping Use: Never used   Substance and Sexual Activity   • Alcohol use: Yes     Alcohol/week: 7.0 standard drinks     Types: 7 Glasses of wine per week     Comment: 7 days per week   • Drug use: No   • Sexual activity: Defer         CURRENT MEDICATIONS    Current Outpatient Medications:   •  B Complex Vitamins (B-COMPLEX/B-12) tablet, Take 1 tablet by mouth Daily., Disp: , Rfl:   •  Calcium Carb-Cholecalciferol (CALCIUM 600+D3) 600-200 MG-UNIT tablet, Take 1 tablet by mouth Daily., Disp: , Rfl:   •  cetirizine (zyrTEC) 10 MG tablet, Take 10 mg by mouth Daily., Disp: , Rfl:   •  Cetirizine HCl (ZyrTEC Allergy) 10 MG capsule, , Disp: , Rfl:   •  Cholecalciferol (VITAMIN D3) 2000 UNITS capsule, Take 2,000 Units by mouth daily., Disp: , Rfl:   •  esomeprazole (NEXIUM) 40 MG capsule, Take 40 mg by mouth Every Morning Before Breakfast., Disp: , Rfl:   •  meloxicam (MOBIC) 15 MG tablet, TAKE 1 TABLET(15 MG) BY MOUTH DAILY AS NEEDED FOR PAIN, Disp: 90 tablet, Rfl: 0  •  metoprolol tartrate (LOPRESSOR) 25 MG tablet, Take 25 mg by mouth 2 (Two) Times a Day., Disp: , Rfl: 0  •  Multiple Vitamins-Minerals (CENTRUM SILVER 50+WOMEN PO), Take 1 tablet by mouth Daily., Disp: , Rfl:   •  Probiotic Product (ALIGN PO), Take 1 tablet by mouth Daily., Disp: , Rfl:   •  rosuvastatin (CRESTOR) 10 MG tablet, Take 10 mg by mouth Daily., Disp: , Rfl: 4  •  traMADol (ULTRAM) 50 MG tablet, Take 1 tablet by mouth Every 8 (Eight) Hours As Needed for Moderate Pain  for up to 3 days., Disp: 9 tablet, Rfl: 0  •  Turmeric 500 MG capsule, Take 1 tablet by mouth Daily., Disp: , Rfl:   •   "zoledronic acid (RECLAST) 5 MG/100ML solution infusion, Infuse 5 mg into a venous catheter 1 (One) Time., Disp: , Rfl:   No current facility-administered medications for this visit.    Facility-Administered Medications Ordered in Other Visits:   •  Chlorhexidine Gluconate 2 % pads 1 each, 1 each, Apply externally, Take As Directed, Dean Trinidad MD    ALLERGIES  Propoxyphene, Codeine, and Percocet [oxycodone-acetaminophen]    VITALS  Vitals:    12/15/21 1437   BP: 120/80   Weight: 69.9 kg (154 lb)   Height: 165.1 cm (65\")       LAST RESULTS   Admission on 12/14/2021, Discharged on 12/14/2021   Component Date Value Ref Range Status   • Glucose 12/14/2021 139* 65 - 99 mg/dL Final   • BUN 12/14/2021 19  8 - 23 mg/dL Final   • Creatinine 12/14/2021 0.84  0.57 - 1.00 mg/dL Final   • Sodium 12/14/2021 137  136 - 145 mmol/L Final   • Potassium 12/14/2021 4.3  3.5 - 5.2 mmol/L Final   • Chloride 12/14/2021 102  98 - 107 mmol/L Final   • CO2 12/14/2021 23.7  22.0 - 29.0 mmol/L Final   • Calcium 12/14/2021 9.7  8.6 - 10.5 mg/dL Final   • Total Protein 12/14/2021 7.0  6.0 - 8.5 g/dL Final   • Albumin 12/14/2021 4.40  3.50 - 5.20 g/dL Final   • ALT (SGPT) 12/14/2021 31  1 - 33 U/L Final   • AST (SGOT) 12/14/2021 39* 1 - 32 U/L Final   • Alkaline Phosphatase 12/14/2021 74  39 - 117 U/L Final   • Total Bilirubin 12/14/2021 0.7  0.0 - 1.2 mg/dL Final   • eGFR Non  Amer 12/14/2021 67  >60 mL/min/1.73 Final   • Globulin 12/14/2021 2.6  gm/dL Final   • A/G Ratio 12/14/2021 1.7  g/dL Final   • BUN/Creatinine Ratio 12/14/2021 22.6  7.0 - 25.0 Final   • Anion Gap 12/14/2021 11.3  5.0 - 15.0 mmol/L Final   • Color, UA 12/14/2021 Yellow  Yellow, Straw Final   • Appearance, UA 12/14/2021 Clear  Clear Final   • pH, UA 12/14/2021 6.5  4.5 - 8.0 Final   • Specific Gravity, UA 12/14/2021 1.015  1.003 - 1.030 Final   • Glucose, UA 12/14/2021 Negative  Negative Final   • Ketones, UA 12/14/2021 Negative  Negative Final   • Bilirubin, UA " 12/14/2021 Negative  Negative Final   • Blood, UA 12/14/2021 Negative  Negative Final   • Protein, UA 12/14/2021 Negative  Negative Final   • Leuk Esterase, UA 12/14/2021 Trace* Negative Final   • Nitrite, UA 12/14/2021 Negative  Negative Final   • Urobilinogen, UA 12/14/2021 0.2 E.U./dL  0.2 - 1.0 E.U./dL Final   • WBC 12/14/2021 5.65  3.40 - 10.80 10*3/mm3 Final   • RBC 12/14/2021 5.15  3.77 - 5.28 10*6/mm3 Final   • Hemoglobin 12/14/2021 15.1  12.0 - 15.9 g/dL Final   • Hematocrit 12/14/2021 47.9* 34.0 - 46.6 % Final   • MCV 12/14/2021 93.0  79.0 - 97.0 fL Final   • MCH 12/14/2021 29.3  26.6 - 33.0 pg Final   • MCHC 12/14/2021 31.5  31.5 - 35.7 g/dL Final   • RDW 12/14/2021 14.3  12.3 - 15.4 % Final   • RDW-SD 12/14/2021 49.5  37.0 - 54.0 fl Final   • MPV 12/14/2021 10.6  6.0 - 12.0 fL Final   • Platelets 12/14/2021 228  140 - 450 10*3/mm3 Final   • Neutrophil % 12/14/2021 53.3  42.7 - 76.0 % Final   • Lymphocyte % 12/14/2021 34.3  19.6 - 45.3 % Final   • Monocyte % 12/14/2021 10.8  5.0 - 12.0 % Final   • Eosinophil % 12/14/2021 1.2  0.3 - 6.2 % Final   • Basophil % 12/14/2021 0.4  0.0 - 1.5 % Final   • Immature Grans % 12/14/2021 0.0  0.0 - 0.5 % Final   • Neutrophils, Absolute 12/14/2021 3.01  1.70 - 7.00 10*3/mm3 Final   • Lymphocytes, Absolute 12/14/2021 1.94  0.70 - 3.10 10*3/mm3 Final   • Monocytes, Absolute 12/14/2021 0.61  0.10 - 0.90 10*3/mm3 Final   • Eosinophils, Absolute 12/14/2021 0.07  0.00 - 0.40 10*3/mm3 Final   • Basophils, Absolute 12/14/2021 0.02  0.00 - 0.20 10*3/mm3 Final   • Immature Grans, Absolute 12/14/2021 0.00  0.00 - 0.05 10*3/mm3 Final   • Lipase 12/14/2021 43  13 - 60 U/L Final   • RBC, UA 12/14/2021 None Seen  None Seen /HPF Final   • WBC, UA 12/14/2021 0-2* None Seen /HPF Final   • Bacteria, UA 12/14/2021 None Seen  None Seen /HPF Final   • Squamous Epithelial Cells, UA 12/14/2021 3-6* None Seen, 0-2 /HPF Final   • Hyaline Casts, UA 12/14/2021 None Seen  None Seen /LPF Final   •  Methodology 12/14/2021 Manual Light Microscopy   Final     CT Abdomen Pelvis With Contrast    Result Date: 12/14/2021  Narrative: CT Abdomen Pelvis W INDICATION: 71-year-old emergency department patient with complaint of right upper quadrant abdominal pain beginning this morning. TECHNIQUE: CT of the abdomen and pelvis with Isovue-370-100 cc IV contrast. Coronal and sagittal reconstructions were obtained.  Radiation dose reduction techniques included automated exposure control or exposure modulation based on body size. Count of known CT and cardiac nuc med studies performed in previous 12 months: 0. COMPARISON: CT chest 8/18/2020, ultrasound gallbladder 4/28/2014, PET CT 10/17/2007 FINDINGS: Abdomen: The included images through the lung bases are clear. There are no effusions. The distal esophagus is normal The liver is normal in size but slightly low in attenuation which may indicate underlying steatosis. There is no focal liver lesion The gallbladder is well distended. There are gallstones in the gallbladder with areas of minimal fine nodular thickening of the gallbladder wall which could represent tiny polyps. The common bile duct, pancreas and pancreatic duct are normal. Numerous calcified granulomata in the spleen without splenomegaly. The adrenal glands are normal Both kidneys enhance normally. There is no mass, obstruction. There appear to be 2 tiny nonobstructing intrarenal stones in the left kidney. No right renal stone. No ureterectasis or ureteral calculus. The stomach and small bowel appear normal. There is moderate stool in the colon with multiple diverticula of the distal descending colon and sigmoid colon. There is suggested wall thickening at the junction of descending colon and sigmoid colon with very minimal adjacent groundglass change which could represent changes of diverticulitis. No abscess or perforation seen. Pelvis: The bladder is normal. The uterus is absent. There is no adnexal mass. Bone  window images demonstrate degenerative disc disease at L5-S1. There is prior surgical change of left total hip replacement.     Impression: 1. Diverticulosis of the colon with questionable wall thickening of the junction of descending colon and sigmoid colon which may indicate mild diverticulitis. Suggest treatment for diverticulitis with follow-up short interval colonoscopy if not recently performed 2. Distended gallbladder with cholelithiasis and focal small nodules in the gallbladder wall favored to represent polyps. No definite gallbladder wall thickening or pericholecystic fluid. 3. There are 2 small intrarenal nonobstructing stones on the left. No ureterectasis or ureteral calculus 4. Degenerative disc disease at L5-S1. Signer Name: Lyudmila Kelley MD  Signed: 12/14/2021 2:25 PM  Workstation Name: [id number]  [facility]      PHYSICAL EXAM  Debilities/Disabilities Identified: None  Emotional Behavior: Appropriate  Physical Exam alert elderly white female in no active distress she does not have any clinical jaundice her heart shows a regular rate and rhythm her lungs are clear and equal. Her abdomen shows mild epigastric and right upper quadrant tenderness without mass. There is no flank tenderness. Reviewed her ER records from yesterday. She saw Dr. Moseley. Her CT scan was reviewed by myself and showed cholelithiasis without any surrounding inflammation. Her bilirubin was normal her SGOT was just mildly elevated her white blood count was normal.    ASSESSMENT  Cholelithiasis      PLAN  The risk benefits and options were discussed with the patient and her  in detail. He wants to proceed with a laparoscopic cholecystectomy. She is traveling to [place] as soon as she can. I recommended that she at least hold off for 2 weeks after surgery. She would need to stop her meloxicam and we can do her surgery next Wednesday. I did give her her the option of going to [place] and having her  surgery there because she will be there till April. She deferred on that option.

## 2021-12-17 ENCOUNTER — PRE-ADMISSION TESTING (OUTPATIENT)
Dept: PREADMISSION TESTING | Facility: HOSPITAL | Age: 71
End: 2021-12-17

## 2021-12-17 VITALS
HEART RATE: 76 BPM | DIASTOLIC BLOOD PRESSURE: 81 MMHG | WEIGHT: 152.5 LBS | HEIGHT: 65 IN | OXYGEN SATURATION: 100 % | BODY MASS INDEX: 25.41 KG/M2 | SYSTOLIC BLOOD PRESSURE: 129 MMHG | RESPIRATION RATE: 16 BRPM

## 2021-12-20 ENCOUNTER — LAB (OUTPATIENT)
Dept: LAB | Facility: HOSPITAL | Age: 71
End: 2021-12-20

## 2021-12-20 ENCOUNTER — OFFICE VISIT (OUTPATIENT)
Dept: INTERNAL MEDICINE | Facility: CLINIC | Age: 71
End: 2021-12-20

## 2021-12-20 VITALS
TEMPERATURE: 96.2 F | HEART RATE: 74 BPM | OXYGEN SATURATION: 99 % | HEIGHT: 65 IN | BODY MASS INDEX: 25.16 KG/M2 | WEIGHT: 151 LBS | SYSTOLIC BLOOD PRESSURE: 128 MMHG | RESPIRATION RATE: 16 BRPM | DIASTOLIC BLOOD PRESSURE: 86 MMHG

## 2021-12-20 DIAGNOSIS — K80.20 CALCULUS OF GALLBLADDER WITHOUT CHOLECYSTITIS WITHOUT OBSTRUCTION: ICD-10-CM

## 2021-12-20 DIAGNOSIS — K80.12 CALCULUS OF GALLBLADDER WITH ACUTE ON CHRONIC CHOLECYSTITIS WITHOUT OBSTRUCTION: Primary | ICD-10-CM

## 2021-12-20 PROCEDURE — 99214 OFFICE O/P EST MOD 30 MIN: CPT | Performed by: INTERNAL MEDICINE

## 2021-12-20 NOTE — PROGRESS NOTES
"Chief Complaint  Follow-up (er VISIT )    Subjective          Prema Hoang presents to [facility]  History of Present Illness  She had a gallbladder \"attack \"last week.  She was seen in emergency department and CT scan abdomen and pelvis does show cholelithiasis with some gallbladder wall thickening.  Radiology also mention some subtle possible thickness in the descending and sigmoid colon.  She has no diarrhea, changes in stool or bloody stools.  She had a colonoscopy in November 2020 that was normal except 1 polyp and moderate diverticulosis.  Objective   Vital Signs:   /86 (BP Location: Left arm, Patient Position: Sitting, Cuff Size: Large Adult)   Pulse 74   Temp 96.2 °F (35.7 °C) (Temporal)   Resp 16   Ht 165.1 cm (65\")   Wt 68.5 kg (151 lb)   SpO2 99%   BMI 25.13 kg/m²     Physical Exam  Vitals and nursing note reviewed.   Constitutional:       Appearance: Normal appearance.   HENT:      Head: Normocephalic and atraumatic.   Cardiovascular:      Rate and Rhythm: Normal rate and regular rhythm.   Pulmonary:      Effort: Pulmonary effort is normal.      Breath sounds: Normal breath sounds.   Abdominal:      General: Abdomen is flat.      Palpations: Abdomen is soft.   Musculoskeletal:         General: No swelling or deformity.      Cervical back: Neck supple.      Right lower leg: No edema.      Left lower leg: No edema.   Skin:     General: Skin is warm.      Capillary Refill: Capillary refill takes less than 2 seconds.      Findings: No rash.   Neurological:      General: No focal deficit present.      Mental Status: She is alert and oriented to person, place, and time.        Result Review : ER notes and CT scan                Assessment and Plan cholelithiasis with symptomatic disease.  She is scheduled for cholecystectomy on Wednesday.  Medically stable for preoperative clearance.  This possible thickening in the descending and sigmoid colon I " think is not significant.  She had a colonoscopy right a year ago that was not concerning.  Can follow-up with GI after her cholecystectomy but I do not think this is going to be any problem.  She has had no other changes in stool habits or bloody stools.  Asymptomatic kidney stones not unusual and we will continue to monitor that.  Follow-up if any problems postoperatively  Diagnoses and all orders for this visit:    1. Calculus of gallbladder with acute on chronic cholecystitis without obstruction (Primary)        Follow Up   No follow-ups on file.  Patient was given instructions and counseling regarding her condition or for health maintenance advice. Please see specific information pulled into the AVS if appropriate.

## 2021-12-21 ENCOUNTER — ANESTHESIA EVENT (OUTPATIENT)
Dept: PERIOP | Facility: HOSPITAL | Age: 71
End: 2021-12-21

## 2021-12-21 ENCOUNTER — LAB (OUTPATIENT)
Dept: LAB | Facility: HOSPITAL | Age: 71
End: 2021-12-21

## 2021-12-21 DIAGNOSIS — Z01.818 OTHER SPECIFIED PRE-OPERATIVE EXAMINATION: Primary | ICD-10-CM

## 2021-12-21 LAB — SARS-COV-2 ORF1AB RESP QL NAA+PROBE: NOT DETECTED

## 2021-12-21 PROCEDURE — U0004 COV-19 TEST NON-CDC HGH THRU: HCPCS

## 2021-12-21 PROCEDURE — U0005 INFEC AGEN DETEC AMPLI PROBE: HCPCS

## 2021-12-21 PROCEDURE — C9803 HOPD COVID-19 SPEC COLLECT: HCPCS

## 2021-12-22 ENCOUNTER — ANESTHESIA (OUTPATIENT)
Dept: PERIOP | Facility: HOSPITAL | Age: 71
End: 2021-12-22

## 2021-12-22 ENCOUNTER — HOSPITAL ENCOUNTER (OUTPATIENT)
Facility: HOSPITAL | Age: 71
Setting detail: HOSPITAL OUTPATIENT SURGERY
Discharge: HOME | End: 2021-12-22
Attending: SURGERY | Admitting: NURSE ANESTHETIST, CERTIFIED REGISTERED

## 2021-12-22 VITALS
BODY MASS INDEX: 25.09 KG/M2 | HEIGHT: 65 IN | WEIGHT: 150.6 LBS | OXYGEN SATURATION: 95 % | RESPIRATION RATE: 20 BRPM | TEMPERATURE: 97.8 F | SYSTOLIC BLOOD PRESSURE: 91 MMHG | HEART RATE: 75 BPM | DIASTOLIC BLOOD PRESSURE: 64 MMHG

## 2021-12-22 DIAGNOSIS — K80.20 CALCULUS OF GALLBLADDER WITHOUT CHOLECYSTITIS WITHOUT OBSTRUCTION: ICD-10-CM

## 2021-12-22 PROCEDURE — 25010000002 FENTANYL CITRATE (PF) 50 MCG/ML SOLUTION: Performed by: NURSE ANESTHETIST, CERTIFIED REGISTERED

## 2021-12-22 PROCEDURE — 25010000002 SUCCINYLCHOLINE PER 20 MG: Performed by: NURSE ANESTHETIST, CERTIFIED REGISTERED

## 2021-12-22 PROCEDURE — 25010000002 PHENYLEPHRINE 10 MG/ML SOLUTION: Performed by: NURSE ANESTHETIST, CERTIFIED REGISTERED

## 2021-12-22 PROCEDURE — 25010000002 DEXAMETHASONE PER 1 MG: Performed by: NURSE ANESTHETIST, CERTIFIED REGISTERED

## 2021-12-22 PROCEDURE — 25010000002 PROPOFOL 10 MG/ML EMULSION: Performed by: NURSE ANESTHETIST, CERTIFIED REGISTERED

## 2021-12-22 PROCEDURE — 88304 TISSUE EXAM BY PATHOLOGIST: CPT | Performed by: SURGERY

## 2021-12-22 PROCEDURE — 47562 LAPAROSCOPIC CHOLECYSTECTOMY: CPT | Performed by: SURGERY

## 2021-12-22 PROCEDURE — 0 CEFAZOLIN SODIUM-DEXTROSE 2-3 GM-%(50ML) RECONSTITUTED SOLUTION: Performed by: SURGERY

## 2021-12-22 PROCEDURE — 25010000002 ONDANSETRON PER 1 MG: Performed by: NURSE ANESTHETIST, CERTIFIED REGISTERED

## 2021-12-22 PROCEDURE — 25010000002 MAGNESIUM SULFATE PER 500 MG OF MAGNESIUM: Performed by: NURSE ANESTHETIST, CERTIFIED REGISTERED

## 2021-12-22 PROCEDURE — 25010000002 KETOROLAC TROMETHAMINE PER 15 MG: Performed by: NURSE ANESTHETIST, CERTIFIED REGISTERED

## 2021-12-22 PROCEDURE — 25010000002 NEOSTIGMINE 10 MG/10ML SOLUTION: Performed by: NURSE ANESTHETIST, CERTIFIED REGISTERED

## 2021-12-22 PROCEDURE — 25010000002 MIDAZOLAM PER 1MG: Performed by: NURSE ANESTHETIST, CERTIFIED REGISTERED

## 2021-12-22 DEVICE — LIGAMAX 5 MM ENDOSCOPIC MULTIPLE CLIP APPLIER
Type: IMPLANTABLE DEVICE | Site: ABDOMEN | Status: FUNCTIONAL
Brand: LIGAMAX

## 2021-12-22 RX ORDER — SODIUM CHLORIDE, SODIUM LACTATE, POTASSIUM CHLORIDE, CALCIUM CHLORIDE 600; 310; 30; 20 MG/100ML; MG/100ML; MG/100ML; MG/100ML
100 INJECTION, SOLUTION INTRAVENOUS CONTINUOUS
Status: DISCONTINUED | OUTPATIENT
Start: 2021-12-22 | End: 2021-12-22 | Stop reason: HOSPADM

## 2021-12-22 RX ORDER — SODIUM CHLORIDE 9 MG/ML
40 INJECTION, SOLUTION INTRAVENOUS AS NEEDED
Status: DISCONTINUED | OUTPATIENT
Start: 2021-12-22 | End: 2021-12-22 | Stop reason: HOSPADM

## 2021-12-22 RX ORDER — ONDANSETRON 2 MG/ML
4 INJECTION INTRAMUSCULAR; INTRAVENOUS ONCE
Status: COMPLETED | OUTPATIENT
Start: 2021-12-22 | End: 2021-12-22

## 2021-12-22 RX ORDER — SODIUM CHLORIDE 0.9 % (FLUSH) 0.9 %
10 SYRINGE (ML) INJECTION EVERY 12 HOURS SCHEDULED
Status: DISCONTINUED | OUTPATIENT
Start: 2021-12-22 | End: 2021-12-22 | Stop reason: HOSPADM

## 2021-12-22 RX ORDER — LIDOCAINE HYDROCHLORIDE 10 MG/ML
0.5 INJECTION, SOLUTION EPIDURAL; INFILTRATION; INTRACAUDAL; PERINEURAL ONCE AS NEEDED
Status: DISCONTINUED | OUTPATIENT
Start: 2021-12-22 | End: 2021-12-22 | Stop reason: HOSPADM

## 2021-12-22 RX ORDER — LIDOCAINE HYDROCHLORIDE 20 MG/ML
INJECTION, SOLUTION INFILTRATION; PERINEURAL AS NEEDED
Status: DISCONTINUED | OUTPATIENT
Start: 2021-12-22 | End: 2021-12-22 | Stop reason: SURG

## 2021-12-22 RX ORDER — DEXAMETHASONE SODIUM PHOSPHATE 4 MG/ML
4 INJECTION, SOLUTION INTRA-ARTICULAR; INTRALESIONAL; INTRAMUSCULAR; INTRAVENOUS; SOFT TISSUE ONCE AS NEEDED
Status: COMPLETED | OUTPATIENT
Start: 2021-12-22 | End: 2021-12-22

## 2021-12-22 RX ORDER — PROPOFOL 10 MG/ML
VIAL (ML) INTRAVENOUS AS NEEDED
Status: DISCONTINUED | OUTPATIENT
Start: 2021-12-22 | End: 2021-12-22 | Stop reason: SURG

## 2021-12-22 RX ORDER — KETAMINE HYDROCHLORIDE 10 MG/ML
INJECTION INTRAMUSCULAR; INTRAVENOUS AS NEEDED
Status: DISCONTINUED | OUTPATIENT
Start: 2021-12-22 | End: 2021-12-22 | Stop reason: SURG

## 2021-12-22 RX ORDER — ROCURONIUM BROMIDE 10 MG/ML
INJECTION, SOLUTION INTRAVENOUS AS NEEDED
Status: DISCONTINUED | OUTPATIENT
Start: 2021-12-22 | End: 2021-12-22 | Stop reason: SURG

## 2021-12-22 RX ORDER — SODIUM CHLORIDE 0.9 % (FLUSH) 0.9 %
10 SYRINGE (ML) INJECTION AS NEEDED
Status: DISCONTINUED | OUTPATIENT
Start: 2021-12-22 | End: 2021-12-22 | Stop reason: HOSPADM

## 2021-12-22 RX ORDER — PHENYLEPHRINE HYDROCHLORIDE 10 MG/ML
INJECTION INTRAVENOUS AS NEEDED
Status: DISCONTINUED | OUTPATIENT
Start: 2021-12-22 | End: 2021-12-22 | Stop reason: SURG

## 2021-12-22 RX ORDER — ALBUTEROL SULFATE 90 UG/1
AEROSOL, METERED RESPIRATORY (INHALATION) AS NEEDED
Status: DISCONTINUED | OUTPATIENT
Start: 2021-12-22 | End: 2021-12-22 | Stop reason: SURG

## 2021-12-22 RX ORDER — HYDROMORPHONE HYDROCHLORIDE 2 MG/1
2 TABLET ORAL
Status: DISCONTINUED | OUTPATIENT
Start: 2021-12-22 | End: 2021-12-22 | Stop reason: HOSPADM

## 2021-12-22 RX ORDER — CEFAZOLIN SODIUM 2 G/50ML
2 SOLUTION INTRAVENOUS ONCE
Status: COMPLETED | OUTPATIENT
Start: 2021-12-22 | End: 2021-12-22

## 2021-12-22 RX ORDER — SUCCINYLCHOLINE CHLORIDE 20 MG/ML
INJECTION INTRAMUSCULAR; INTRAVENOUS AS NEEDED
Status: DISCONTINUED | OUTPATIENT
Start: 2021-12-22 | End: 2021-12-22 | Stop reason: SURG

## 2021-12-22 RX ORDER — LIDOCAINE HYDROCHLORIDE AND EPINEPHRINE 10; 10 MG/ML; UG/ML
INJECTION, SOLUTION INFILTRATION; PERINEURAL AS NEEDED
Status: DISCONTINUED | OUTPATIENT
Start: 2021-12-22 | End: 2021-12-22 | Stop reason: HOSPADM

## 2021-12-22 RX ORDER — DEXMEDETOMIDINE HYDROCHLORIDE 100 UG/ML
INJECTION, SOLUTION INTRAVENOUS AS NEEDED
Status: DISCONTINUED | OUTPATIENT
Start: 2021-12-22 | End: 2021-12-22 | Stop reason: SURG

## 2021-12-22 RX ORDER — FAMOTIDINE 10 MG/ML
20 INJECTION, SOLUTION INTRAVENOUS
Status: DISCONTINUED | OUTPATIENT
Start: 2021-12-22 | End: 2021-12-22 | Stop reason: HOSPADM

## 2021-12-22 RX ORDER — MAGNESIUM HYDROXIDE 1200 MG/15ML
LIQUID ORAL AS NEEDED
Status: DISCONTINUED | OUTPATIENT
Start: 2021-12-22 | End: 2021-12-22 | Stop reason: HOSPADM

## 2021-12-22 RX ORDER — FENTANYL CITRATE 50 UG/ML
25 INJECTION, SOLUTION INTRAMUSCULAR; INTRAVENOUS
Status: DISCONTINUED | OUTPATIENT
Start: 2021-12-22 | End: 2021-12-22 | Stop reason: HOSPADM

## 2021-12-22 RX ORDER — ONDANSETRON 4 MG/1
4 TABLET, FILM COATED ORAL EVERY 4 HOURS PRN
Qty: 20 TABLET | Refills: 0 | Status: SHIPPED | OUTPATIENT
Start: 2021-12-22 | End: 2022-01-04

## 2021-12-22 RX ORDER — SODIUM CHLORIDE, SODIUM LACTATE, POTASSIUM CHLORIDE, CALCIUM CHLORIDE 600; 310; 30; 20 MG/100ML; MG/100ML; MG/100ML; MG/100ML
9 INJECTION, SOLUTION INTRAVENOUS CONTINUOUS
Status: DISCONTINUED | OUTPATIENT
Start: 2021-12-22 | End: 2021-12-22 | Stop reason: HOSPADM

## 2021-12-22 RX ORDER — HYDROMORPHONE HYDROCHLORIDE 2 MG/1
2 TABLET ORAL EVERY 4 HOURS PRN
Qty: 20 TABLET | Refills: 0 | Status: SHIPPED | OUTPATIENT
Start: 2021-12-22 | End: 2022-01-04

## 2021-12-22 RX ORDER — NEOSTIGMINE METHYLSULFATE 1 MG/ML
INJECTION, SOLUTION INTRAVENOUS AS NEEDED
Status: DISCONTINUED | OUTPATIENT
Start: 2021-12-22 | End: 2021-12-22 | Stop reason: SURG

## 2021-12-22 RX ORDER — ONDANSETRON 2 MG/ML
4 INJECTION INTRAMUSCULAR; INTRAVENOUS ONCE AS NEEDED
Status: COMPLETED | OUTPATIENT
Start: 2021-12-22 | End: 2021-12-22

## 2021-12-22 RX ORDER — MIDAZOLAM HYDROCHLORIDE 2 MG/2ML
0.5 INJECTION, SOLUTION INTRAMUSCULAR; INTRAVENOUS
Status: DISCONTINUED | OUTPATIENT
Start: 2021-12-22 | End: 2021-12-22 | Stop reason: HOSPADM

## 2021-12-22 RX ORDER — GLYCOPYRROLATE 0.2 MG/ML
INJECTION INTRAMUSCULAR; INTRAVENOUS AS NEEDED
Status: DISCONTINUED | OUTPATIENT
Start: 2021-12-22 | End: 2021-12-22 | Stop reason: SURG

## 2021-12-22 RX ORDER — MAGNESIUM SULFATE HEPTAHYDRATE 500 MG/ML
INJECTION, SOLUTION INTRAMUSCULAR; INTRAVENOUS AS NEEDED
Status: DISCONTINUED | OUTPATIENT
Start: 2021-12-22 | End: 2021-12-22 | Stop reason: SURG

## 2021-12-22 RX ORDER — KETOROLAC TROMETHAMINE 30 MG/ML
INJECTION, SOLUTION INTRAMUSCULAR; INTRAVENOUS AS NEEDED
Status: DISCONTINUED | OUTPATIENT
Start: 2021-12-22 | End: 2021-12-22 | Stop reason: SURG

## 2021-12-22 RX ORDER — ACETAMINOPHEN 500 MG
1000 TABLET ORAL ONCE
Status: COMPLETED | OUTPATIENT
Start: 2021-12-22 | End: 2021-12-22

## 2021-12-22 RX ADMIN — GLYCOPYRROLATE 0.4 MG: 0.2 INJECTION INTRAMUSCULAR; INTRAVENOUS at 08:41

## 2021-12-22 RX ADMIN — CEFAZOLIN SODIUM 2 G: 2 SOLUTION INTRAVENOUS at 08:02

## 2021-12-22 RX ADMIN — FENTANYL CITRATE 25 MCG: 50 INJECTION, SOLUTION INTRAMUSCULAR; INTRAVENOUS at 09:41

## 2021-12-22 RX ADMIN — LIDOCAINE HYDROCHLORIDE 100 MG: 20 INJECTION, SOLUTION INFILTRATION; PERINEURAL at 07:56

## 2021-12-22 RX ADMIN — ONDANSETRON 4 MG: 2 INJECTION INTRAMUSCULAR; INTRAVENOUS at 10:38

## 2021-12-22 RX ADMIN — PROPOFOL 80 MG: 10 INJECTION, EMULSION INTRAVENOUS at 07:56

## 2021-12-22 RX ADMIN — KETAMINE HYDROCHLORIDE 20 MG: 10 INJECTION, SOLUTION INTRAMUSCULAR; INTRAVENOUS at 07:56

## 2021-12-22 RX ADMIN — DEXAMETHASONE SODIUM PHOSPHATE 4 MG: 4 INJECTION, SOLUTION INTRAMUSCULAR; INTRAVENOUS at 07:37

## 2021-12-22 RX ADMIN — DEXMEDETOMIDINE 20 MCG: 100 INJECTION, SOLUTION, CONCENTRATE INTRAVENOUS at 08:04

## 2021-12-22 RX ADMIN — ROCURONIUM BROMIDE 20 MG: 10 INJECTION INTRAVENOUS at 08:03

## 2021-12-22 RX ADMIN — SODIUM CHLORIDE, POTASSIUM CHLORIDE, SODIUM LACTATE AND CALCIUM CHLORIDE 9 ML/HR: 600; 310; 30; 20 INJECTION, SOLUTION INTRAVENOUS at 07:38

## 2021-12-22 RX ADMIN — FAMOTIDINE 20 MG: 10 INJECTION, SOLUTION INTRAVENOUS at 07:37

## 2021-12-22 RX ADMIN — ALBUTEROL SULFATE 1 PUFF: 90 AEROSOL, METERED RESPIRATORY (INHALATION) at 08:26

## 2021-12-22 RX ADMIN — FENTANYL CITRATE 25 MCG: 50 INJECTION, SOLUTION INTRAMUSCULAR; INTRAVENOUS at 09:46

## 2021-12-22 RX ADMIN — ACETAMINOPHEN 1000 MG: 500 TABLET, FILM COATED ORAL at 07:36

## 2021-12-22 RX ADMIN — HYDROMORPHONE HYDROCHLORIDE 2 MG: 2 TABLET ORAL at 10:42

## 2021-12-22 RX ADMIN — ONDANSETRON 4 MG: 2 INJECTION INTRAMUSCULAR; INTRAVENOUS at 07:37

## 2021-12-22 RX ADMIN — SUCCINYLCHOLINE CHLORIDE 80 MG: 20 INJECTION, SOLUTION INTRAMUSCULAR; INTRAVENOUS at 07:56

## 2021-12-22 RX ADMIN — MIDAZOLAM HYDROCHLORIDE 0.5 MG: 1 INJECTION, SOLUTION INTRAMUSCULAR; INTRAVENOUS at 07:38

## 2021-12-22 RX ADMIN — KETOROLAC TROMETHAMINE 15 MG: 30 INJECTION, SOLUTION INTRAMUSCULAR; INTRAVENOUS at 08:40

## 2021-12-22 RX ADMIN — PHENYLEPHRINE HYDROCHLORIDE 50 MCG: 10 INJECTION INTRAVENOUS at 08:16

## 2021-12-22 RX ADMIN — DEXMEDETOMIDINE 20 MCG: 100 INJECTION, SOLUTION, CONCENTRATE INTRAVENOUS at 08:01

## 2021-12-22 RX ADMIN — KETAMINE HYDROCHLORIDE 10 MG: 10 INJECTION, SOLUTION INTRAMUSCULAR; INTRAVENOUS at 08:08

## 2021-12-22 RX ADMIN — MAGNESIUM SULFATE HEPTAHYDRATE 2 G: 500 INJECTION, SOLUTION INTRAMUSCULAR; INTRAVENOUS at 08:11

## 2021-12-22 RX ADMIN — NEOSTIGMINE METHYLSULFATE 2 MG: 1 INJECTION INTRAVENOUS at 08:41

## 2021-12-22 NOTE — ANESTHESIA PROCEDURE NOTES
Airway  Urgency: elective    Date/Time: 12/22/2021 7:59 AM  Airway not difficult    General Information and Staff    Patient location during procedure: OR  CRNA: Zafar Cervantes CRNA    Indications and Patient Condition  Indications for airway management: airway protection    Preoxygenated: yes  Mask difficulty assessment: 0 - not attempted    Final Airway Details  Final airway type: endotracheal airway      Successful airway: ETT  Cuffed: yes   Successful intubation technique: direct laryngoscopy  Endotracheal tube insertion site: oral  Blade: De La Torre  Blade size: 2  ETT size (mm): 7.0  Cormack-Lehane Classification: grade I - full view of glottis  Placement verified by: chest auscultation and capnometry   Measured from: lips  ETT/EBT  to lips (cm): 21  Number of attempts at approach: 1  Assessment: lips, teeth, and gum same as pre-op and atraumatic intubation

## 2021-12-22 NOTE — INTERVAL H&P NOTE
"  H&P updated. The patient was examined and the following changes are noted:        /86 (BP Location: Right arm, Patient Position: Standing)   Pulse 76   Temp 98.6 °F (37 °C) (Oral)   Resp 16   Ht 165.1 cm (65\")   Wt 68.3 kg (150 lb 9.6 oz)   SpO2 97%   BMI 25.06 kg/m²    "

## 2021-12-22 NOTE — OP NOTE
Preoperative diagnosis cholelithiasis  Postoperative diagnosis the same  Procedure laparoscopic cholecystectomy  Complications none  Drains none  Specimen gallbladder pathology  Estimated blood loss 10 cc  Anesthesia General via endotracheal tube  Surgeon Dr. Hirsch  Assistant Milka Manuel, necessary for retraction  Findings multiple gallstones  Procedure after satisfactory induction of general anesthesia via endotracheal tube the patient's abdomen was prepped and draped sterile fashion.  Transverse infraumbilical incision was made carried down through the skin and subcutaneous tissue.  Fascia was controlled medially and laterally between 0 Ethibond stay sutures.  Fascia and peritoneum were divided.  Opal trocar was placed within the abdomen under direct vision.  General survey the abdomen was essentially normal.  5 mm ports x3 were placed 1 in the epigastrium 2 in the right upper quadrant under direct vision after each site had been locally infiltrated with 1% lidocaine with epinephrine.  Gallbladder is grasped, neck of the gallbladder was dissected out at its junction with the cystic duct.  Cystic duct was dissected out cystic artery dissected out.  Cystic duct was clipped x3 and divided leaving 2 clips on the cystic duct stump.  Cystic artery was clipped x3 and divided leaving 2 clips on the cystic artery stump.  Several lymphatics were hemoclipped as well.  Gallbladder was sequentially taken out of the liver bed with cautery.  Hemostasis was assured gallbladder was removed from the abdomen was inspected found be divided at the neck of the gallbladder cystic duct junction.  Right upper quadrant was again visualized all clips were intact hemostasis was assured.  5 mm ports were sequentially pulled back all were hemostatic.  Abdomen was desufflated Opal trocar was removed.  Infraumbilical fascia was closed in interrupted simple fashion with use of 0 Ethibond placing all sutures and tying at completion.  Skin was  closed with 4-0 Monocryl running subcuticular stitch burying the knots.  Wounds were cleaned and dried and sterilely dressed.  The patient tolerated the procedure well was transferred to the recovery room in stable condition.  When she is awake alert stable tolerating p.o. and has voided she will be discharged home.  She may shower in the a.m.  No lifting more than 5 pounds x 6 weeks.  Diet as tolerated.  She was sent a prescription for Dilaudid 2 mg p.o. every 4 hours as needed pain 20 these were dispensed without refills and Zofran 4 mg p.o. every 4 hours as needed nausea 20 were dispensed without refills.  She should call for problems and call for appointment

## 2021-12-22 NOTE — ANESTHESIA POSTPROCEDURE EVALUATION
Patient: Prema Hoang    Procedure Summary     Date: 12/22/21 Room / Location: [facility] OR 1 /  [facility] OR    Anesthesia Start: 0752 Anesthesia Stop: 0903    Procedure: CHOLECYSTECTOMY LAPAROSCOPIC (N/A Abdomen) Diagnosis:       Calculus of gallbladder without cholecystitis without obstruction      (Calculus of gallbladder without cholecystitis without obstruction [K80.20])    Surgeons: Christopher Hirsch MD Provider:     Anesthesia Type: general ASA Status: 2          Anesthesia Type: general    Vitals  Vitals Value Taken Time   BP 91/63 12/22/21 1005   Temp 97.5 °F (36.4 °C) 12/22/21 0902   Pulse 72 12/22/21 1005   Resp 18 12/22/21 1005   SpO2 94 % 12/22/21 1005           Post Anesthesia Care and Evaluation    Patient location during evaluation: PHASE II  Patient participation: complete - patient participated  Level of consciousness: awake  Pain management: adequate  Airway patency: patent  Anesthetic complications: No anesthetic complications  PONV Status: none  Cardiovascular status: acceptable  Respiratory status: acceptable  Hydration status: acceptable

## 2021-12-22 NOTE — ANESTHESIA PREPROCEDURE EVALUATION
Anesthesia Evaluation     Patient summary reviewed and Nursing notes reviewed   history of anesthetic complications (From narcotics): PONV  NPO Solid Status: > 8 hours  NPO Liquid Status: > 8 hours           Airway   Mallampati: II  TM distance: >3 FB  Neck ROM: full  No difficulty expected  Dental - normal exam     Pulmonary - normal exam    breath sounds clear to auscultation  (+) a smoker Former,   Cardiovascular - normal exam  Exercise tolerance: good (4-7 METS)    Rhythm: regular  Rate: normal    (+) hyperlipidemia,   (-) hypertension      Neuro/Psych  (+) headaches,     GI/Hepatic/Renal/Endo    (+)  GERD well controlled,  liver disease fatty liver disease,     Musculoskeletal     Abdominal  - normal exam   Substance History      OB/GYN negative ob/gyn ROS         Other   arthritis,    history of cancer remission                    Anesthesia Plan    ASA 2     general     intravenous induction     Anesthetic plan, all risks, benefits, and alternatives have been provided, discussed and informed consent has been obtained with: patient.  Use of blood products discussed with patient  Consented to blood products.

## 2021-12-23 LAB
LAB AP CASE REPORT: NORMAL
PATH REPORT.FINAL DX SPEC: NORMAL
PATH REPORT.GROSS SPEC: NORMAL

## 2021-12-23 RX ORDER — PROMETHAZINE HYDROCHLORIDE 25 MG/1
25 TABLET ORAL EVERY 6 HOURS PRN
Qty: 10 TABLET | Refills: 0 | Status: SHIPPED | OUTPATIENT
Start: 2021-12-23 | End: 2022-01-04

## 2022-01-04 ENCOUNTER — OFFICE VISIT (OUTPATIENT)
Dept: SURGERY | Facility: CLINIC | Age: 72
End: 2022-01-04

## 2022-01-04 VITALS
SYSTOLIC BLOOD PRESSURE: 122 MMHG | DIASTOLIC BLOOD PRESSURE: 72 MMHG | HEIGHT: 65 IN | WEIGHT: 154 LBS | BODY MASS INDEX: 25.66 KG/M2

## 2022-01-04 DIAGNOSIS — Z09 SURGICAL FOLLOW-UP CARE: Primary | ICD-10-CM

## 2022-01-04 PROCEDURE — 99024 POSTOP FOLLOW-UP VISIT: CPT | Performed by: SURGERY

## 2022-01-04 NOTE — PROGRESS NOTES
Patient presents for 13 day po lap aj. She has no complaints.  She is without complaints.  Her incisions are healing well there is no impulse.  She is not having clinical jaundice.  Her pathology was discussed.  Activity level was discussed.  She is leaving for [place] and I like to see her when she gets back in town

## 2022-03-01 ENCOUNTER — TELEPHONE (OUTPATIENT)
Dept: INTERNAL MEDICINE | Facility: CLINIC | Age: 72
End: 2022-03-01

## 2022-03-02 ENCOUNTER — TELEPHONE (OUTPATIENT)
Dept: INTERNAL MEDICINE | Facility: CLINIC | Age: 72
End: 2022-03-02

## 2022-03-02 RX ORDER — MELOXICAM 15 MG/1
15 TABLET ORAL DAILY
Qty: 30 TABLET | Refills: 0 | Status: SHIPPED | OUTPATIENT
Start: 2022-03-02 | End: 2022-05-26

## 2022-03-02 RX ORDER — FAMCICLOVIR 500 MG/1
500 TABLET ORAL 3 TIMES DAILY
Qty: 21 TABLET | Refills: 1 | Status: SHIPPED | OUTPATIENT
Start: 2022-03-02 | End: 2022-11-21

## 2022-03-02 RX ORDER — FAMCICLOVIR 500 MG/1
500 TABLET ORAL 3 TIMES DAILY
Qty: 21 TABLET | Refills: 1 | Status: SHIPPED | OUTPATIENT
Start: 2022-03-02 | End: 2022-03-02 | Stop reason: SDUPTHER

## 2022-03-02 NOTE — TELEPHONE ENCOUNTER
I sent in a months worth.  Might be worthwhile setting appointment up and actually talking about what she needs it for and side effects monitoring etc.

## 2022-03-02 NOTE — TELEPHONE ENCOUNTER
Caller: Prema Hoang DEBI    Relationship: Self    Best call back number: [phone]    Requested Prescriptions:   Requested Prescriptions     Pending Prescriptions Disp Refills   • famciclovir (FAMVIR) 500 MG tablet 21 tablet 1     Sig: Take 1 tablet by mouth 3 (Three) Times a Day.        Pharmacy where request should be sent: [id number] [place] [address] AT [facility] [address]  [phone] [address] [phone] FX     Additional details provided by patient: PATIENT IS COMPLETELY OUT.      Does the patient have less than a 3 day supply:  [x] Yes  [] No    Jerome Hanna Rep   03/02/22 12:09 EST

## 2022-05-26 RX ORDER — MELOXICAM 15 MG/1
TABLET ORAL
Qty: 90 TABLET | Refills: 0 | Status: SHIPPED | OUTPATIENT
Start: 2022-05-26 | End: 2022-11-20

## 2022-05-26 NOTE — TELEPHONE ENCOUNTER
Rx Refill Note  Requested Prescriptions     Pending Prescriptions Disp Refills   • meloxicam (MOBIC) 15 MG tablet [Pharmacy Med Name: MELOXICAM 15MG TABLETS] 90 tablet      Sig: TAKE 1 TABLET(15 MG) BY MOUTH DAILY AS NEEDED FOR PAIN      Last office visit with prescribing clinician: 12/20/2021      Next office visit with prescribing clinician: 6/10/2022            Rafia Lundberg MA  05/26/22, 13:48 EDT

## 2022-06-01 ENCOUNTER — TELEPHONE (OUTPATIENT)
Dept: INTERNAL MEDICINE | Facility: CLINIC | Age: 72
End: 2022-06-01

## 2022-06-03 ENCOUNTER — TELEPHONE (OUTPATIENT)
Dept: INTERNAL MEDICINE | Facility: CLINIC | Age: 72
End: 2022-06-03

## 2022-06-10 ENCOUNTER — OFFICE VISIT (OUTPATIENT)
Dept: INTERNAL MEDICINE | Facility: CLINIC | Age: 72
End: 2022-06-10

## 2022-06-10 VITALS
OXYGEN SATURATION: 97 % | HEIGHT: 65 IN | BODY MASS INDEX: 25.49 KG/M2 | TEMPERATURE: 96.6 F | HEART RATE: 95 BPM | SYSTOLIC BLOOD PRESSURE: 124 MMHG | DIASTOLIC BLOOD PRESSURE: 76 MMHG | RESPIRATION RATE: 16 BRPM | WEIGHT: 153 LBS

## 2022-06-10 DIAGNOSIS — M62.89 PELVIC FLOOR DYSFUNCTION IN FEMALE: ICD-10-CM

## 2022-06-10 DIAGNOSIS — R73.01 IMPAIRED FASTING GLUCOSE: ICD-10-CM

## 2022-06-10 DIAGNOSIS — R23.3 EASY BRUISING: ICD-10-CM

## 2022-06-10 DIAGNOSIS — I49.3 PVC (PREMATURE VENTRICULAR CONTRACTION): ICD-10-CM

## 2022-06-10 DIAGNOSIS — R23.3 SPONTANEOUS ECCHYMOSES: ICD-10-CM

## 2022-06-10 DIAGNOSIS — E78.2 MIXED HYPERLIPIDEMIA: Primary | ICD-10-CM

## 2022-06-10 DIAGNOSIS — R05.9 COUGH: ICD-10-CM

## 2022-06-10 DIAGNOSIS — R58 HEMORRHAGE, NOT ELSEWHERE CLASSIFIED: ICD-10-CM

## 2022-06-10 DIAGNOSIS — K21.9 GASTROESOPHAGEAL REFLUX DISEASE WITHOUT ESOPHAGITIS: ICD-10-CM

## 2022-06-10 DIAGNOSIS — R82.90 UNSPECIFIED ABNORMAL FINDINGS IN URINE: ICD-10-CM

## 2022-06-10 PROCEDURE — 99214 OFFICE O/P EST MOD 30 MIN: CPT | Performed by: INTERNAL MEDICINE

## 2022-06-10 RX ORDER — ALBUTEROL SULFATE 90 UG/1
2 AEROSOL, METERED RESPIRATORY (INHALATION) EVERY 4 HOURS PRN
Qty: 8 G | Refills: 2 | Status: SHIPPED | OUTPATIENT
Start: 2022-06-10 | End: 2022-10-26

## 2022-06-10 NOTE — ASSESSMENT & PLAN NOTE
CONTROLLED  - cont on high dose PPI once daily  - reflux precautions reviewed and cont to encourage dietary trigger avoidance

## 2022-06-10 NOTE — PROGRESS NOTES
"Chief Complaint  Follow-up, Hyperlipidemia, Headache (After standing x 1 month), Mass (On neck ), Difficulty Urinating (X 2 months), Cough (X 3-4 weeks), and Abrasion (Skin issue)    Subjective          Prema Hoang presents to [facility] for follow up and med refills. Pt taking all medications daily as prescribed with good reported compliance. No issues or side effects with meds.   Has mass on neck over L shoulder by clavicle. Not painful, been present x year or more. Size has not changed, just persistent.   Having headaches when she goes from sitting to standing. Not every time. Usually resolves when she sits down for a little bit. Worsening over past month. No syncopal events. Drinks about 16-32 ounces of water per day.   Difficulty urinating x 2 months. Feels like she has significant urgency to urinate but when she goes has severe hesitancy. No dysuria. No hematuria. No incontinence. No flank pain or suprapubic tenderness.   Cough ongoing x 3-4 weeks. Tightness in her chest associated. Happens when she goes to take a deep breath. Occasionally productive but typically dry or spastic. No smoking x >30 years. Has needed an inh while under anesthesia related to wheezing. Cough does not get worse at night. Will hear herself wheeze intermittently.       Objective   Vital Signs:     /76   Pulse 95   Temp 96.6 °F (35.9 °C)   Resp 16   Ht 165.1 cm (65\")   Wt 69.4 kg (153 lb)   SpO2 97%   BMI 25.46 kg/m²     Physical Exam  Vitals and nursing note reviewed.   Constitutional:       General: She is not in acute distress.     Appearance: Normal appearance.   Neck:      Comments: Palpable discrete golf ball sized lesion just superior to L clavicle, fluctuant, mobile, non-tender, no overlying erythema or pain  Cardiovascular:      Rate and Rhythm: Normal rate and regular rhythm.      Pulses: Normal pulses.      Heart sounds: Normal heart sounds. No murmur " heard.  Pulmonary:      Effort: Pulmonary effort is normal. No respiratory distress.      Breath sounds: Normal breath sounds.   Abdominal:      General: Abdomen is flat. Bowel sounds are normal.      Palpations: Abdomen is soft.      Tenderness: There is no abdominal tenderness.   Musculoskeletal:      Right lower leg: No edema.      Left lower leg: No edema.   Skin:     Comments: Numerous ecchymoses of various sizes and stages of healing scattered over all extremities with some abrasions present   Neurological:      Mental Status: She is alert and oriented to person, place, and time. Mental status is at baseline.   Psychiatric:         Mood and Affect: Mood normal.         Behavior: Behavior normal.        Brief Urine Lab Results  (Last result in the past 365 days)      Color   Clarity   Blood   Leuk Est   Nitrite   Protein   CREAT   Urine HCG        12/14/21 1226 Yellow   Clear   Negative   Trace   Negative   Negative                 Result Review :   The following data was reviewed by: Ivanna Coates MD on 06/10/2022:  CMP    CMP 8/10/21 10/18/21 12/14/21   Glucose 93 110 (A) 139 (A)   BUN 20 25 (A) 19   Creatinine 0.75 0.75 0.84   eGFR Non  Am 76 76 67   eGFR African Am 92     Sodium 139 140 137   Potassium 4.6 4.2 4.3   Chloride 99 105 102   Calcium 10.3 9.7 9.7   Total Protein 6.7     Albumin 4.60  4.40   Globulin 2.1     Total Bilirubin 0.4  0.7   Alkaline Phosphatase 82  74   AST (SGOT) 35 (A)  39 (A)   ALT (SGPT) 28  31   (A) Abnormal value       Comments are available for some flowsheets but are not being displayed.           CBC w/diff    CBC w/Diff 12/14/21   WBC 5.65   RBC 5.15   Hemoglobin 15.1   Hematocrit 47.9 (A)   MCV 93.0   MCH 29.3   MCHC 31.5   RDW 14.3   Platelets 228   Neutrophil Rel % 53.3   Immature Granulocyte Rel % 0.0   Lymphocyte Rel % 34.3   Monocyte Rel % 10.8   Eosinophil Rel % 1.2   Basophil Rel % 0.4   (A) Abnormal value               Assessment and Plan      Diagnoses  and all orders for this visit:    1. Mixed hyperlipidemia (Primary)  Assessment & Plan:  CONTROLLED  - FLP today for ongoing monitoring  - cont on current high intensity statin, will adjust dose as indicated based on labs  - cont with good diet and lifestyle changes including increased exercise, low chol and low fat diet, and increased fiber        Orders:  -     Comprehensive Metabolic Panel  -     Lipid Panel    2. Impaired fasting glucose  -     Comprehensive Metabolic Panel  -     Hemoglobin A1c    3. Gastroesophageal reflux disease without esophagitis  Assessment & Plan:  CONTROLLED  - cont on high dose PPI once daily  - reflux precautions reviewed and cont to encourage dietary trigger avoidance        4. PVC (premature ventricular contraction)  Assessment & Plan:  CONTROLLED  - cont on Metop BID, symptoms controlled  - avoid triggers like alcohol and caffeine       5. Easy bruising   - strongly suspect this is just related to old age and thinning skin but pt very worried and does believe it has crossed threshold of what should be normal even for old age   - does have numerous bruises in various stages of healing all over all extremities from reportedly minor events/trauma   - will get labs as below to look for basic coag defects     Orders:  -     CBC & Differential  -     Protime-INR  -     APTT      6. Pelvic floor dysfunction in female   - urine studies as below   - will send for PT for pelvic floor therapy to try to improve dysfunctional voiding   - discussed URO-GYN referral, will hold on this for now as pt is not having any incontinence or pain issues, has not yet developed recurrent UTI concerns    Orders:  -     Urine Culture - Urine, Urine, Clean Catch  -     Urinalysis With Microscopic - Urine, Clean Catch    9. Cough  -     albuterol sulfate  (90 Base) MCG/ACT inhaler; Inhale 2 puffs Every 4 (Four) Hours As Needed for Wheezing or Shortness of Air.  Dispense: 8 g; Refill: 2    Follow Up    Return in about 6 months (around 12/10/2022) for Annual physical.    Patient was given instructions and counseling regarding her condition or for health maintenance advice. Please see specific information pulled into the AVS if appropriate.     Chantal Coates MD  [facility] [place] [facility]  Phone: [phone]  Fax: [phone]

## 2022-06-10 NOTE — ASSESSMENT & PLAN NOTE
CONTROLLED  - FLP today for ongoing monitoring  - cont on current high intensity statin, will adjust dose as indicated based on labs  - cont with good diet and lifestyle changes including increased exercise, low chol and low fat diet, and increased fiber

## 2022-06-16 LAB
ALBUMIN SERPL-MCNC: 4.2 G/DL (ref 3.7–4.7)
ALBUMIN/GLOB SERPL: 1.8 {RATIO} (ref 1.2–2.2)
ALP SERPL-CCNC: 90 IU/L (ref 44–121)
ALT SERPL-CCNC: 30 IU/L (ref 0–32)
APPEARANCE UR: CLEAR
APTT PPP: 26 SEC (ref 24–33)
AST SERPL-CCNC: 32 IU/L (ref 0–40)
BACTERIA #/AREA URNS HPF: NORMAL /[HPF]
BACTERIA UR CULT: ABNORMAL
BACTERIA UR CULT: ABNORMAL
BASOPHILS # BLD AUTO: 0 X10E3/UL (ref 0–0.2)
BASOPHILS NFR BLD AUTO: 1 %
BILIRUB SERPL-MCNC: 0.3 MG/DL (ref 0–1.2)
BILIRUB UR QL STRIP: NEGATIVE
BUN SERPL-MCNC: 24 MG/DL (ref 8–27)
BUN/CREAT SERPL: 32 (ref 12–28)
CALCIUM SERPL-MCNC: 9.9 MG/DL (ref 8.7–10.3)
CASTS URNS QL MICRO: NORMAL /LPF
CHLORIDE SERPL-SCNC: 104 MMOL/L (ref 96–106)
CHOLEST SERPL-MCNC: 178 MG/DL (ref 100–199)
CO2 SERPL-SCNC: 23 MMOL/L (ref 20–29)
COLOR UR: YELLOW
CREAT SERPL-MCNC: 0.74 MG/DL (ref 0.57–1)
EGFRCR SERPLBLD CKD-EPI 2021: 85 ML/MIN/1.73
EOSINOPHIL # BLD AUTO: 0.2 X10E3/UL (ref 0–0.4)
EOSINOPHIL NFR BLD AUTO: 3 %
EPI CELLS #/AREA URNS HPF: NORMAL /HPF (ref 0–10)
ERYTHROCYTE [DISTWIDTH] IN BLOOD BY AUTOMATED COUNT: 12.3 % (ref 11.7–15.4)
GLOBULIN SER CALC-MCNC: 2.3 G/DL (ref 1.5–4.5)
GLUCOSE SERPL-MCNC: 105 MG/DL (ref 65–99)
GLUCOSE UR QL STRIP: NEGATIVE
HBA1C MFR BLD: 6 % (ref 4.8–5.6)
HCT VFR BLD AUTO: 43.1 % (ref 34–46.6)
HDLC SERPL-MCNC: 61 MG/DL
HGB BLD-MCNC: 14.6 G/DL (ref 11.1–15.9)
HGB UR QL STRIP: NEGATIVE
IMM GRANULOCYTES # BLD AUTO: 0 X10E3/UL (ref 0–0.1)
IMM GRANULOCYTES NFR BLD AUTO: 1 %
INR PPP: 1 (ref 0.9–1.2)
KETONES UR QL STRIP: NEGATIVE
LDLC SERPL CALC-MCNC: 100 MG/DL (ref 0–99)
LEUKOCYTE ESTERASE UR QL STRIP: ABNORMAL
LYMPHOCYTES # BLD AUTO: 1.3 X10E3/UL (ref 0.7–3.1)
LYMPHOCYTES NFR BLD AUTO: 20 %
MCH RBC QN AUTO: 30.6 PG (ref 26.6–33)
MCHC RBC AUTO-ENTMCNC: 33.9 G/DL (ref 31.5–35.7)
MCV RBC AUTO: 90 FL (ref 79–97)
MICRO URNS: ABNORMAL
MONOCYTES # BLD AUTO: 0.8 X10E3/UL (ref 0.1–0.9)
MONOCYTES NFR BLD AUTO: 13 %
NEUTROPHILS # BLD AUTO: 4 X10E3/UL (ref 1.4–7)
NEUTROPHILS NFR BLD AUTO: 62 %
NITRITE UR QL STRIP: NEGATIVE
OTHER ANTIBIOTIC SUSC ISLT: ABNORMAL
PH UR STRIP: 5.5 [PH] (ref 5–7.5)
PLATELET # BLD AUTO: 260 X10E3/UL (ref 150–450)
POTASSIUM SERPL-SCNC: 4.5 MMOL/L (ref 3.5–5.2)
PROT SERPL-MCNC: 6.5 G/DL (ref 6–8.5)
PROT UR QL STRIP: NEGATIVE
PROTHROMBIN TIME: 10.4 SEC (ref 9.1–12)
RBC # BLD AUTO: 4.77 X10E6/UL (ref 3.77–5.28)
RBC #/AREA URNS HPF: NORMAL /HPF (ref 0–2)
SODIUM SERPL-SCNC: 140 MMOL/L (ref 134–144)
SP GR UR STRIP: 1.02 (ref 1–1.03)
TRIGL SERPL-MCNC: 94 MG/DL (ref 0–149)
UROBILINOGEN UR STRIP-MCNC: 0.2 MG/DL (ref 0.2–1)
VLDLC SERPL CALC-MCNC: 17 MG/DL (ref 5–40)
WBC # BLD AUTO: 6.3 X10E3/UL (ref 3.4–10.8)
WBC #/AREA URNS HPF: NORMAL /HPF (ref 0–5)

## 2022-06-16 RX ORDER — NITROFURANTOIN 25; 75 MG/1; MG/1
100 CAPSULE ORAL 2 TIMES DAILY
Qty: 10 CAPSULE | Refills: 0 | Status: SHIPPED | OUTPATIENT
Start: 2022-06-16 | End: 2022-09-02

## 2022-08-29 ENCOUNTER — OFFICE VISIT (OUTPATIENT)
Dept: INTERNAL MEDICINE | Facility: CLINIC | Age: 72
End: 2022-08-29

## 2022-08-29 VITALS
HEART RATE: 108 BPM | SYSTOLIC BLOOD PRESSURE: 126 MMHG | WEIGHT: 154 LBS | TEMPERATURE: 97.7 F | DIASTOLIC BLOOD PRESSURE: 84 MMHG | OXYGEN SATURATION: 95 % | HEIGHT: 65 IN | RESPIRATION RATE: 16 BRPM | BODY MASS INDEX: 25.66 KG/M2

## 2022-08-29 DIAGNOSIS — R10.10 PAIN OF UPPER ABDOMEN: Primary | ICD-10-CM

## 2022-08-29 PROCEDURE — 99214 OFFICE O/P EST MOD 30 MIN: CPT | Performed by: INTERNAL MEDICINE

## 2022-08-29 RX ORDER — PANTOPRAZOLE SODIUM 40 MG/1
40 TABLET, DELAYED RELEASE ORAL
Qty: 60 TABLET | Refills: 1 | Status: SHIPPED | OUTPATIENT
Start: 2022-08-29 | End: 2022-10-31 | Stop reason: SDUPTHER

## 2022-08-29 RX ORDER — CIPROFLOXACIN 500 MG/1
500 TABLET, FILM COATED ORAL 2 TIMES DAILY
COMMUNITY
Start: 2022-08-26 | End: 2022-09-23

## 2022-08-29 NOTE — PROGRESS NOTES
"Chief Complaint  GI Problem    Subjective          Prema Hoang presents to [facility] for ongoing GI issues.   Pt had GB removed Dec 2021 and has progressive increase in abd pains since that time. Has a lot of loose stool. No fecal urgency. No blood in stool. Does get bloating and pain in epigastric region. No vomiting. Has been trying to identify what foods make the symptoms worse but cannot find any triggers.   Has appt planned with Dr. Doty in Oct to discuss issues further.     Objective   Vital Signs:     /84   Pulse 108   Temp 97.7 °F (36.5 °C)   Resp 16   Ht 165.1 cm (65\")   Wt 69.9 kg (154 lb)   SpO2 95%   BMI 25.63 kg/m²     Physical Exam  Vitals and nursing note reviewed.   Constitutional:       General: She is not in acute distress.     Appearance: Normal appearance.   Cardiovascular:      Rate and Rhythm: Normal rate and regular rhythm.      Pulses: Normal pulses.      Heart sounds: Normal heart sounds. No murmur heard.  Pulmonary:      Effort: Pulmonary effort is normal. No respiratory distress.      Breath sounds: Normal breath sounds.   Abdominal:      General: Abdomen is flat. Bowel sounds are normal. There is distension.      Palpations: Abdomen is soft. There is no mass.      Tenderness: There is abdominal tenderness (LUQ, RUQ, epigastrium). There is guarding (voluntary). There is no rebound.   Musculoskeletal:      Right lower leg: No edema.      Left lower leg: No edema.   Neurological:      Mental Status: She is alert and oriented to person, place, and time. Mental status is at baseline.   Psychiatric:         Mood and Affect: Mood normal.         Behavior: Behavior normal.          Result Review :   The following data was reviewed by: Ivanna Coates MD on 08/29/2022:  CMP    CMP 10/18/21 12/14/21 6/10/22   Glucose 110 (A) 139 (A) 105 (A)   BUN 25 (A) 19 24   Creatinine 0.75 0.84 0.74   eGFR Non African Am 76 67    Sodium 140 " 137 140   Potassium 4.2 4.3 4.5   Chloride 105 102 104   Calcium 9.7 9.7 9.9   Total Protein   6.5   Albumin  4.40 4.2   Globulin   2.3   Total Bilirubin  0.7 0.3   Alkaline Phosphatase  74 90   AST (SGOT)  39 (A) 32   ALT (SGPT)  31 30   (A) Abnormal value            CBC w/diff    CBC w/Diff 12/14/21 6/10/22   WBC 5.65 6.3   RBC 5.15 4.77   Hemoglobin 15.1 14.6   Hematocrit 47.9 (A) 43.1   MCV 93.0 90   MCH 29.3 30.6   MCHC 31.5 33.9   RDW 14.3 12.3   Platelets 228 260   Neutrophil Rel % 53.3 62   Immature Granulocyte Rel % 0.0    Lymphocyte Rel % 34.3 20   Monocyte Rel % 10.8 13   Eosinophil Rel % 1.2 3   Basophil Rel % 0.4 1   (A) Abnormal value            Lipid Panel    Lipid Panel 6/10/22   Total Cholesterol 178   Triglycerides 94   HDL Cholesterol 61   VLDL Cholesterol 17   LDL Cholesterol  100 (A)   (A) Abnormal value                Most Recent A1C    HGBA1C Most Recent 6/10/22   Hemoglobin A1C 6.0 (A)   (A) Abnormal value       Comments are available for some flowsheets but are not being displayed.              Assessment and Plan      Diagnoses and all orders for this visit:    1. 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  Dispense: 60 tablet; Refill: 1      Follow Up   Return if symptoms worsen or fail to improve.    Patient was given instructions and  counseling regarding her condition or for health maintenance advice. Please see specific information pulled into the AVS if appropriate.     Chantal Coates MD  [facility] [place] [facility]  Phone: [phone]  Fax: [phone]

## 2022-09-02 ENCOUNTER — OFFICE VISIT (OUTPATIENT)
Dept: CARDIOLOGY | Facility: CLINIC | Age: 72
End: 2022-09-02

## 2022-09-02 VITALS
WEIGHT: 154 LBS | OXYGEN SATURATION: 99 % | HEART RATE: 81 BPM | BODY MASS INDEX: 25.66 KG/M2 | SYSTOLIC BLOOD PRESSURE: 126 MMHG | DIASTOLIC BLOOD PRESSURE: 84 MMHG | RESPIRATION RATE: 16 BRPM | HEIGHT: 65 IN

## 2022-09-02 DIAGNOSIS — I47.1 PSVT (PAROXYSMAL SUPRAVENTRICULAR TACHYCARDIA): ICD-10-CM

## 2022-09-02 DIAGNOSIS — E78.49 OTHER HYPERLIPIDEMIA: ICD-10-CM

## 2022-09-02 DIAGNOSIS — R00.2 PALPITATIONS: Primary | ICD-10-CM

## 2022-09-02 PROCEDURE — 93000 ELECTROCARDIOGRAM COMPLETE: CPT | Performed by: INTERNAL MEDICINE

## 2022-09-02 PROCEDURE — 99213 OFFICE O/P EST LOW 20 MIN: CPT | Performed by: INTERNAL MEDICINE

## 2022-09-02 RX ORDER — METOPROLOL SUCCINATE 25 MG/1
25 TABLET, EXTENDED RELEASE ORAL DAILY
Qty: 90 TABLET | Refills: 3 | Status: SHIPPED | OUTPATIENT
Start: 2022-09-02

## 2022-09-02 RX ORDER — ROSUVASTATIN CALCIUM 5 MG/1
TABLET, COATED ORAL
COMMUNITY
End: 2022-11-18

## 2022-09-02 NOTE — PROGRESS NOTES
"      CARDIOLOGY    Sarahy Hill MD    ENCOUNTER DATE:  09/02/2022    Prema Hoang / 72 y.o. / female        CHIEF COMPLAINT / REASON FOR OFFICE VISIT     Heart Problem (New Patient: wanting to establish care )      HISTORY OF PRESENT ILLNESS       HPI    Prema Hoang is a 72 y.o. female     This is a nice lady whose  is also a patient of mine. She has hyperlipidemia and history of palpitations. She used to follow with Dr. Campos. She apparently has nonsustained SVT and has been treated with metoprolol.     She does have some palpitations a couple of times a week. It is a quick flutter. It is associated with lightheadedness and the need to cough. She is trying to exercise but does have hip and shoulder problems.          REVIEW OF SYSTEMS     Review of Systems   Constitutional: Negative for chills, fever, weight gain and weight loss.   Cardiovascular: Negative for leg swelling.   Respiratory: Negative for cough, snoring and wheezing.    Hematologic/Lymphatic: Negative for bleeding problem. Bruises/bleeds easily.   Skin: Negative for color change.   Musculoskeletal: Positive for joint pain. Negative for falls and myalgias.   Gastrointestinal: Negative for melena.   Genitourinary: Negative for hematuria.   Neurological: Negative for excessive daytime sleepiness.   Psychiatric/Behavioral: Negative for depression. The patient is not nervous/anxious.          VITAL SIGNS     Visit Vitals  /84 (BP Location: Right arm, Patient Position: Sitting, Cuff Size: Adult)   Pulse 81   Resp 16   Ht 165.1 cm (65\")   Wt 69.9 kg (154 lb)   SpO2 99%   BMI 25.63 kg/m²         Wt Readings from Last 3 Encounters:   09/02/22 69.9 kg (154 lb)   08/29/22 69.9 kg (154 lb)   06/10/22 69.4 kg (153 lb)     Body mass index is 25.63 kg/m².      PHYSICAL EXAMINATION     Constitutional:       General: Not in acute distress.  Neck:      Vascular: No carotid bruit or JVD.   Pulmonary:      Effort: Pulmonary effort is normal.      " Breath sounds: Normal breath sounds.   Cardiovascular:      Normal rate. Regular rhythm.      Murmurs: There is no murmur.   Psychiatric:         Mood and Affect: Mood and affect normal.           REVIEWED DATA       ECG 12 Lead    Date/Time: 9/2/2022 10:26 AM  Performed by: Sarahy Hill MD  Authorized by: Sarahy Hill MD   Comparison: compared with previous ECG from 9/17/2018  Similar to previous ECG  Rhythm: sinus rhythm  BPM: 81  Conduction: conduction normal  Q waves: III and aVF    ST Segments: ST segments normal  T Waves: T waves normal    Clinical impression: normal ECG              Lipid Panel    Lipid Panel 6/10/22   Total Cholesterol 178   Triglycerides 94   HDL Cholesterol 61   VLDL Cholesterol 17   LDL Cholesterol  100 (A)   (A) Abnormal value              Lab Results   Component Value Date    GLUCOSE 105 (H) 06/10/2022    BUN 24 06/10/2022    CREATININE 0.74 06/10/2022    EGFRIFNONA 67 12/14/2021    EGFRIFAFRI 92 08/10/2021    BCR 32 (H) 06/10/2022    K 4.5 06/10/2022    CO2 23 06/10/2022    CALCIUM 9.9 06/10/2022    PROTENTOTREF 6.5 06/10/2022    ALBUMIN 4.2 06/10/2022    LABIL2 1.8 06/10/2022    AST 32 06/10/2022    ALT 30 06/10/2022       ASSESSMENT & PLAN      Diagnosis Plan   1. Palpitations     2. Other hyperlipidemia     3. PSVT (paroxysmal supraventricular tachycardia) ([facility])           1.  History of paroxysmal nonsustained supraventricular tachycardia. She has been on metoprolol tartrate 25 mg b.i.d. I am going to switch her to metoprolol succinate 25 mg a day. I reviewed old records from [place] and in 2019 she had an echo with normal LV function, LVH, and no valve disease. She had a normal nuclear stress test at that time. I told her to call me if the palpitations get worse or more frequent.   2.  Hyperlipidemia, on statin therapy.   3.  Q waves in III and AVF. Present on an EKG from 2018. No evidence of heart attack on echo or stress test in 2019.     Follow up with Bambi next year,  and come in sooner if she has a change in symptoms.         Orders Placed This Encounter   Procedures   • ECG 12 Lead     This order was created via procedure documentation     Order Specific Question:   Release to patient     Answer:   Routine Release           MEDICATIONS         Discharge Medications          Accurate as of September 2, 2022 10:28 AM. If you have any questions, ask your nurse or doctor.            New Medications      Instructions Start Date   metoprolol succinate XL 25 MG 24 hr tablet  Commonly known as: TOPROL-XL  Started by: Sarahy Hill MD   25 mg, Oral, Daily         Changes to Medications      Instructions Start Date   famciclovir 500 MG tablet  Commonly known as: FAMVIR  What changed: additional instructions   500 mg, Oral, 3 Times Daily         Continue These Medications      Instructions Start Date   albuterol sulfate  (90 Base) MCG/ACT inhaler  Commonly known as: PROVENTIL HFA;VENTOLIN HFA;PROAIR HFA   2 puffs, Inhalation, Every 4 Hours PRN      ALIGN PO   1 tablet, Oral, Daily      B-Complex/B-12 tablet   1 tablet, Oral, Daily      Calcium Carb-Cholecalciferol 600-200 MG-UNIT tablet   1 tablet, Oral, Daily      cetirizine 10 MG tablet  Commonly known as: zyrTEC   10 mg, Oral, Daily      ciprofloxacin 500 MG tablet  Commonly known as: CIPRO   500 mg, Oral, 2 Times Daily, as directed      meloxicam 15 MG tablet  Commonly known as: MOBIC   TAKE 1 TABLET(15 MG) BY MOUTH DAILY AS NEEDED FOR PAIN      multivitamin with minerals tablet tablet   1 tablet, Oral, Daily      pantoprazole 40 MG EC tablet  Commonly known as: Protonix   40 mg, Oral, 2 Times Daily Before Meals      rosuvastatin 5 MG tablet  Commonly known as: CRESTOR   rosuvastatin 5 mg tablet   TAKE 1 TABLET BY MOUTH EVERY DAY      Turmeric 500 MG capsule   1 tablet, Oral, Daily      Vitamin D3 50 MCG (2000 UT) capsule   2,000 Units, Oral, Daily      zoledronic acid 5 MG/100ML solution infusion  Commonly known as:  RECLAST   5 mg, Intravenous, Takes twice a year         Stop These Medications    metoprolol tartrate 25 MG tablet  Commonly known as: LOPRESSOR  Stopped by: Sarahy Hill MD     nitrofurantoin (macrocrystal-monohydrate) 100 MG capsule  Commonly known as: Macrobid  Stopped by: MD Sarahy Nava MD  09/02/22  10:28 EDT    **Dragon Disclaimer:   Much of this encounter note is an electronic transcription/translation of spoken language to printed text. The electronic translation of spoken language may permit erroneous, or at times, nonsensical words or phrases to be inadvertently transcribed. Although I have reviewed the note for such errors, some may still exist.

## 2022-09-19 ENCOUNTER — TRANSCRIBE ORDERS (OUTPATIENT)
Dept: ADMINISTRATIVE | Facility: HOSPITAL | Age: 72
End: 2022-09-19

## 2022-09-19 DIAGNOSIS — M25.511 RIGHT SHOULDER PAIN, UNSPECIFIED CHRONICITY: Primary | ICD-10-CM

## 2022-09-20 ENCOUNTER — TRANSCRIBE ORDERS (OUTPATIENT)
Dept: ADMINISTRATIVE | Facility: HOSPITAL | Age: 72
End: 2022-09-20

## 2022-09-20 DIAGNOSIS — M81.0 SENILE OSTEOPOROSIS: Primary | ICD-10-CM

## 2022-09-22 ENCOUNTER — HOSPITAL ENCOUNTER (OUTPATIENT)
Dept: MRI IMAGING | Facility: HOSPITAL | Age: 72
Discharge: HOME | End: 2022-09-22
Admitting: INTERNAL MEDICINE

## 2022-09-22 DIAGNOSIS — R10.10 PAIN OF UPPER ABDOMEN: ICD-10-CM

## 2022-09-22 PROCEDURE — 74183 MRI ABD W/O CNTR FLWD CNTR: CPT

## 2022-09-22 PROCEDURE — 0 GADOBENATE DIMEGLUMINE 529 MG/ML SOLUTION: Performed by: INTERNAL MEDICINE

## 2022-09-22 PROCEDURE — A9577 INJ MULTIHANCE: HCPCS | Performed by: INTERNAL MEDICINE

## 2022-09-22 PROCEDURE — 82565 ASSAY OF CREATININE: CPT

## 2022-09-22 RX ADMIN — GADOBENATE DIMEGLUMINE 14 ML: 529 INJECTION, SOLUTION INTRAVENOUS at 13:28

## 2022-09-23 ENCOUNTER — OFFICE VISIT (OUTPATIENT)
Dept: INTERNAL MEDICINE | Facility: CLINIC | Age: 72
End: 2022-09-23

## 2022-09-23 VITALS
WEIGHT: 155 LBS | BODY MASS INDEX: 25.83 KG/M2 | DIASTOLIC BLOOD PRESSURE: 74 MMHG | SYSTOLIC BLOOD PRESSURE: 110 MMHG | HEART RATE: 84 BPM | RESPIRATION RATE: 16 BRPM | HEIGHT: 65 IN | OXYGEN SATURATION: 95 % | TEMPERATURE: 95.8 F

## 2022-09-23 DIAGNOSIS — L08.9 ABRASION OF ANKLE WITH INFECTION, LEFT, INITIAL ENCOUNTER: Primary | ICD-10-CM

## 2022-09-23 DIAGNOSIS — S90.512A ABRASION OF ANKLE WITH INFECTION, LEFT, INITIAL ENCOUNTER: Primary | ICD-10-CM

## 2022-09-23 LAB — CREAT BLDA-MCNC: 1 MG/DL (ref 0.6–1.3)

## 2022-09-23 PROCEDURE — 99213 OFFICE O/P EST LOW 20 MIN: CPT | Performed by: INTERNAL MEDICINE

## 2022-09-23 RX ORDER — METHYLPREDNISOLONE 4 MG/1
TABLET ORAL
COMMUNITY
Start: 2022-09-06 | End: 2022-10-26 | Stop reason: ALTCHOICE

## 2022-09-23 RX ORDER — SULFAMETHOXAZOLE AND TRIMETHOPRIM 800; 160 MG/1; MG/1
TABLET ORAL
COMMUNITY
End: 2022-10-26 | Stop reason: ALTCHOICE

## 2022-09-23 NOTE — PROGRESS NOTES
"Chief Complaint  Wound Check    Subjective          Prema Hoang presents to [facility] for follow up on wound check. She had an accident 2 weeks ago where the outside layer of skin was almost \"shaved off\" on her left lower leg against a nightstand. No drainage from wound. No fevers or chills. Does have a little swelling in her ankle beneath the wound. Does have a little redness around the area. Pain does seem to be improving over time.   She was placed on abx and topical mupirocin 1 week ago, today will be last day of bactrim.     Objective   Vital Signs:     /74   Pulse 84   Temp 95.8 °F (35.4 °C)   Resp 16   Ht 165.1 cm (65\")   Wt 70.3 kg (155 lb)   SpO2 95%   BMI 25.79 kg/m²     Physical Exam  Vitals and nursing note reviewed.   Constitutional:       General: She is not in acute distress.     Appearance: Normal appearance.   Pulmonary:      Effort: Pulmonary effort is normal. No respiratory distress.   Skin:     Comments: Left medial upper ankle with 1.5 in x 1 in abrasion injury with small scab formation at center with thin overlying clear new skin formation, mild erythema around edges but no streaking, no drainage, no tenderness, mild edema in surrounding tissue in more distal dependent ankle   Neurological:      Mental Status: She is alert and oriented to person, place, and time. Mental status is at baseline.   Psychiatric:         Mood and Affect: Mood normal.         Thought Content: Thought content normal.         Judgment: Judgment normal.          Result Review : : None    Assessment and Plan      Diagnoses and all orders for this visit:    1. Abrasion of ankle with infection, left, initial encounter (Primary)    - complete course of current PO abx  - continue use of mupirocin while healing  - ok to wash with gentle soaps, use vaseline as needed for lubrication  - reviewed signs of poor wound healing and when to call back, would refer on to " wound care prn    Follow Up   Return if symptoms worsen or fail to improve.    Patient was given instructions and counseling regarding her condition or for health maintenance advice. Please see specific information pulled into the AVS if appropriate.     Chantal Coates MD  [facility] [place] [facility]  Phone: [phone]  Fax: [phone]

## 2022-09-30 ENCOUNTER — APPOINTMENT (OUTPATIENT)
Dept: MRI IMAGING | Facility: HOSPITAL | Age: 72
End: 2022-09-30

## 2022-10-05 ENCOUNTER — OFFICE (OUTPATIENT)
Dept: URBAN - METROPOLITAN AREA CLINIC 66 | Facility: CLINIC | Age: 72
End: 2022-10-05

## 2022-10-05 VITALS
SYSTOLIC BLOOD PRESSURE: 139 MMHG | WEIGHT: 152 LBS | HEIGHT: 64 IN | HEART RATE: 87 BPM | DIASTOLIC BLOOD PRESSURE: 89 MMHG

## 2022-10-05 DIAGNOSIS — R14.0 ABDOMINAL DISTENSION (GASEOUS): ICD-10-CM

## 2022-10-05 DIAGNOSIS — K76.0 FATTY (CHANGE OF) LIVER, NOT ELSEWHERE CLASSIFIED: ICD-10-CM

## 2022-10-05 DIAGNOSIS — K91.5 POSTCHOLECYSTECTOMY SYNDROME: ICD-10-CM

## 2022-10-05 DIAGNOSIS — K21.9 GASTRO-ESOPHAGEAL REFLUX DISEASE WITHOUT ESOPHAGITIS: ICD-10-CM

## 2022-10-05 PROCEDURE — 99213 OFFICE O/P EST LOW 20 MIN: CPT | Performed by: NURSE PRACTITIONER

## 2022-10-05 RX ORDER — COLESEVELAM HYDROCHLORIDE 625 MG/1
625 TABLET, FILM COATED ORAL
Qty: 30 | Refills: 11 | Status: COMPLETED
Start: 2022-10-05 | End: 2022-12-05

## 2022-10-24 RX ORDER — ZOLEDRONIC ACID 5 MG/100ML
5 INJECTION, SOLUTION INTRAVENOUS ONCE
Status: CANCELLED | OUTPATIENT
Start: 2022-10-26

## 2022-10-25 ENCOUNTER — APPOINTMENT (OUTPATIENT)
Dept: INFUSION THERAPY | Facility: HOSPITAL | Age: 72
End: 2022-10-25

## 2022-10-25 ENCOUNTER — HOSPITAL ENCOUNTER (OUTPATIENT)
Dept: MRI IMAGING | Facility: HOSPITAL | Age: 72
Discharge: HOME | End: 2022-10-25
Admitting: ORTHOPAEDIC SURGERY

## 2022-10-25 DIAGNOSIS — M25.511 RIGHT SHOULDER PAIN, UNSPECIFIED CHRONICITY: ICD-10-CM

## 2022-10-25 PROCEDURE — 73221 MRI JOINT UPR EXTREM W/O DYE: CPT

## 2022-10-26 ENCOUNTER — HOSPITAL ENCOUNTER (OUTPATIENT)
Dept: INFUSION THERAPY | Facility: HOSPITAL | Age: 72
Discharge: HOME | End: 2022-10-26
Admitting: INTERNAL MEDICINE

## 2022-10-26 ENCOUNTER — TELEPHONE (OUTPATIENT)
Dept: INTERNAL MEDICINE | Facility: CLINIC | Age: 72
End: 2022-10-26

## 2022-10-26 VITALS
SYSTOLIC BLOOD PRESSURE: 150 MMHG | BODY MASS INDEX: 24.96 KG/M2 | WEIGHT: 150 LBS | HEART RATE: 95 BPM | TEMPERATURE: 95.9 F | RESPIRATION RATE: 20 BRPM | DIASTOLIC BLOOD PRESSURE: 90 MMHG | OXYGEN SATURATION: 100 %

## 2022-10-26 DIAGNOSIS — M81.0 SENILE OSTEOPOROSIS: Primary | ICD-10-CM

## 2022-10-26 DIAGNOSIS — Z85.3 PERSONAL HISTORY OF MALIGNANT NEOPLASM OF BREAST: ICD-10-CM

## 2022-10-26 DIAGNOSIS — Z79.811 PROPHYLACTIC USE OF AROMATASE INHIBITORS: ICD-10-CM

## 2022-10-26 DIAGNOSIS — Z92.21 HX ANTINEOPLASTIC CHEMOTHERAPY: Primary | ICD-10-CM

## 2022-10-26 PROCEDURE — 96365 THER/PROPH/DIAG IV INF INIT: CPT

## 2022-10-26 PROCEDURE — 25010000002 ZOLEDRONIC ACID 5 MG/100ML SOLUTION: Performed by: INTERNAL MEDICINE

## 2022-10-26 RX ORDER — ZOLEDRONIC ACID 5 MG/100ML
5 INJECTION, SOLUTION INTRAVENOUS ONCE
Status: COMPLETED | OUTPATIENT
Start: 2022-10-26 | End: 2022-10-26

## 2022-10-26 RX ORDER — ZOLEDRONIC ACID 5 MG/100ML
5 INJECTION, SOLUTION INTRAVENOUS ONCE
Status: CANCELLED | OUTPATIENT
Start: 2023-10-26

## 2022-10-26 RX ADMIN — ZOLEDRONIC ACID 5 MG: 0.05 INJECTION, SOLUTION INTRAVENOUS at 11:13

## 2022-10-26 NOTE — TELEPHONE ENCOUNTER
Caller: [email or web address]    Relationship: Self    Best call back number: [phone]    What is the medical concern/diagnosis: ONCOLOGY     What specialty or service is being requested: ONCOLOGY     What is the provider, practice or medical service name: DR MCCLOUD OR DR [facility]UM[facility]     What is the office location:     What is the office phone number: [phone] FAX    Any additional details:

## 2022-10-26 NOTE — TELEPHONE ENCOUNTER
Called spoke with patient and she is wanting a referral placed for Oncology. Can you please place one?

## 2022-10-31 DIAGNOSIS — R10.10 PAIN OF UPPER ABDOMEN: ICD-10-CM

## 2022-11-01 RX ORDER — PANTOPRAZOLE SODIUM 40 MG/1
40 TABLET, DELAYED RELEASE ORAL
Qty: 60 TABLET | Refills: 1 | Status: SHIPPED | OUTPATIENT
Start: 2022-11-01 | End: 2022-11-22 | Stop reason: SDUPTHER

## 2022-11-18 RX ORDER — ROSUVASTATIN CALCIUM 5 MG/1
TABLET, COATED ORAL
Qty: 90 TABLET | Refills: 3 | Status: SHIPPED | OUTPATIENT
Start: 2022-11-18

## 2022-11-18 NOTE — TELEPHONE ENCOUNTER
Rx Refill Note  Requested Prescriptions     Pending Prescriptions Disp Refills    rosuvastatin (CRESTOR) 5 MG tablet [Pharmacy Med Name: ROSUVASTATIN 5MG TABLETS] 90 tablet      Sig: TAKE 1 TABLET BY MOUTH EVERY DAY      Last office visit with prescribing clinician: 9/2/2022      Next office visit with prescribing clinician: Visit date not found            Mavis Navarrete MA  11/18/22, 13:22 EST

## 2022-11-20 RX ORDER — MELOXICAM 15 MG/1
TABLET ORAL
Qty: 30 TABLET | Refills: 6 | Status: SHIPPED | OUTPATIENT
Start: 2022-11-20

## 2022-11-21 ENCOUNTER — CONSULT (OUTPATIENT)
Dept: ONCOLOGY | Facility: CLINIC | Age: 72
End: 2022-11-21

## 2022-11-21 ENCOUNTER — LAB (OUTPATIENT)
Dept: OTHER | Facility: HOSPITAL | Age: 72
End: 2022-11-21

## 2022-11-21 VITALS
DIASTOLIC BLOOD PRESSURE: 93 MMHG | TEMPERATURE: 97.8 F | SYSTOLIC BLOOD PRESSURE: 142 MMHG | RESPIRATION RATE: 18 BRPM | WEIGHT: 158.1 LBS | HEIGHT: 64 IN | BODY MASS INDEX: 26.99 KG/M2 | HEART RATE: 83 BPM | OXYGEN SATURATION: 99 %

## 2022-11-21 DIAGNOSIS — Z85.3 PERSONAL HISTORY OF MALIGNANT NEOPLASM OF BREAST: Primary | ICD-10-CM

## 2022-11-21 LAB
BASOPHILS # BLD AUTO: 0.04 10*3/MM3 (ref 0–0.2)
BASOPHILS NFR BLD AUTO: 0.7 % (ref 0–1.5)
DEPRECATED RDW RBC AUTO: 45.7 FL (ref 37–54)
EOSINOPHIL # BLD AUTO: 0.1 10*3/MM3 (ref 0–0.4)
EOSINOPHIL NFR BLD AUTO: 1.8 % (ref 0.3–6.2)
ERYTHROCYTE [DISTWIDTH] IN BLOOD BY AUTOMATED COUNT: 13.2 % (ref 12.3–15.4)
HCT VFR BLD AUTO: 43.4 % (ref 34–46.6)
HGB BLD-MCNC: 14 G/DL (ref 12–15.9)
IMM GRANULOCYTES # BLD AUTO: 0.02 10*3/MM3 (ref 0–0.05)
IMM GRANULOCYTES NFR BLD AUTO: 0.4 % (ref 0–0.5)
LYMPHOCYTES # BLD AUTO: 1.86 10*3/MM3 (ref 0.7–3.1)
LYMPHOCYTES NFR BLD AUTO: 33.6 % (ref 19.6–45.3)
MCH RBC QN AUTO: 29.8 PG (ref 26.6–33)
MCHC RBC AUTO-ENTMCNC: 32.3 G/DL (ref 31.5–35.7)
MCV RBC AUTO: 92.3 FL (ref 79–97)
MONOCYTES # BLD AUTO: 0.65 10*3/MM3 (ref 0.1–0.9)
MONOCYTES NFR BLD AUTO: 11.8 % (ref 5–12)
NEUTROPHILS NFR BLD AUTO: 2.86 10*3/MM3 (ref 1.7–7)
NEUTROPHILS NFR BLD AUTO: 51.7 % (ref 42.7–76)
NRBC BLD AUTO-RTO: 0 /100 WBC (ref 0–0.2)
PLATELET # BLD AUTO: 193 10*3/MM3 (ref 140–450)
PMV BLD AUTO: 10.5 FL (ref 6–12)
RBC # BLD AUTO: 4.7 10*6/MM3 (ref 3.77–5.28)
WBC NRBC COR # BLD: 5.53 10*3/MM3 (ref 3.4–10.8)

## 2022-11-21 PROCEDURE — 85025 COMPLETE CBC W/AUTO DIFF WBC: CPT | Performed by: INTERNAL MEDICINE

## 2022-11-21 PROCEDURE — 99204 OFFICE O/P NEW MOD 45 MIN: CPT | Performed by: INTERNAL MEDICINE

## 2022-11-21 PROCEDURE — 36415 COLL VENOUS BLD VENIPUNCTURE: CPT

## 2022-11-21 NOTE — PROGRESS NOTES
Subjective     REASON FOR CONSULTATION: History of breast cancer  Provide an opinion on any further workup or treatment                             REQUESTING PHYSICIAN: Ivanna Coates MD    RECORDS OBTAINED:  Records of the patients history including those obtained from the referring provider were reviewed and summarized in detail.    HISTORY OF PRESENT ILLNESS:  The patient is a 72 y.o. year old female who is here for an opinion about the above issue.    History of Present Illness patient is a 72-year-old female with a remote history of bilateral breast cancers 1 in  and the other in  treated with chemotherapy and hormonal blockade for total of 10 years who returns for follow-up thinking that she needed to be seen by medical oncologist because of her prior history of breast cancer.    Her first breast cancer was a invasive ductal carcinoma grade 2 with a 1.3 cm primary tumor ER/NM positive HER2 2+ with presumed FISH negativity on the left.  Patient underwent lumpectomy and axillary dissection with the findings 2 of 3 sentinel nodes positive and 13 other negative lymph nodes.  She underwent 4 cycles of Adriamycin Cytoxan followed by radiation and 5 years of hormonal blockade initially with tamoxifen and then for 2 years with Arimidex.    In  she was diagnosed with a right breast cancer T1bN0 grade 2 infiltrating ductal and lobular carcinoma measuring 7 mm ER 95 NM negative HER2 negative and she underwent bilateral mastectomy with reconstruction.  She was switched to Femara and had 5 years of therapy which was completed in 2012    Patient reports that she is  4 para 3 with 1 miscarriage first childbirth was at age 19 she did not breast-feed  Menarche was in her early teens menopause was in her late 40s and she had been on hormone replacement therapy with Prempro for over a year when she was first diagnosed with breast cancer.    Family history is positive for brother with prostate  cancer which was widely metastatic and he  at age 65  Father  of mesothelioma  She is not aware of other malignancy in the family-no genetic testing has been done    She smoked for 20 years and quit 30 years ago  She drinks 1 to 2 glasses of white wine a day    No history of MI stroke or DVT    She has osteoporosis and has been on Reclast and Prolia in the past        Past Medical History:   Diagnosis Date   • Abnormal EKG     normal lexiscan per cardiology note lov 2021   • Acute sinusitis    • Breast cancer (HCC) ,     Bilateral-Lumpectomy with radiation and chemotherapy on the left followed by bilateral mastectomy    • Cervical vertebral fusion    • Chest pain, unspecified     none recently, lov cardiology 2021   • Chronic back pain    • Condition not found     LOCALIZED ENLARGED LYMPH NODES   • Condition not found 2017    ULTRASOUND ABD   NEG , GB POLYPS   • Cystitis, unspecified without hematuria    • Environmental allergies    • Essential (primary) hypertension    • Fatty liver    • Gallstones    • GERD (gastroesophageal reflux disease)    • Headache    • High cholesterol    • History of concussion 2020   • History of kidney stones    • Hx of bone density study    • Hx of CT scan of chest 2020    NEG   • Hx of mammogram    • Hyperlipidemia    • Hypertension     pt states she doesn't have   • Insomnia, unspecified    • Low back pain    • Multiple bruises     ARMS, LEGS    • Multiple skin tears     ARMS AND LEGS   • BYRD (nonalcoholic steatohepatitis)    • Osteoarthritis     hips and knees, spine   • Osteoporosis    • Other diseases of pharynx    • Other fatigue    • Other nonthrombocytopenic purpura (HCC)    • Pain in unspecified joint    • PONV (postoperative nausea and vomiting)    • Right knee pain    • SVT (supraventricular tachycardia) (HCC)     follows w/Dr Campos, LOV 21   • Thin skin    • Unspecified abdominal pain    • UTI (urinary tract infection)          Past Surgical History:   Procedure Laterality Date   • ANTERIOR CERVICAL FUSION      ANTERIOR FUSION CERVICAL SPINE   • BACK SURGERY  2004    L5S1   • BREAST LUMPECTOMY Left 2002   • BREAST RECONSTRUCTION Bilateral 11/2007   • CATARACT EXTRACTION W/ INTRAOCULAR LENS  IMPLANT, BILATERAL     • CERVICAL FUSION  2010    C5-C7   • CHOLECYSTECTOMY N/A 12/22/2021    Procedure: CHOLECYSTECTOMY LAPAROSCOPIC;  Surgeon: Christopher Hirsch MD;  Location:  [facility];  Service: General;  Laterality: N/A;   • COLONOSCOPY  2010   • COMBINED HYSTERECTOMY VAGINAL / OOPHORECTOMY / A & P REPAIR  2002   • HIP ARTHROPLASTY Left 04/2021    ball of hip replaced   • MASTECTOMY Bilateral 2007   • MRI INTRAOPERATIVE BRAIN W/ OR W/O CONTRAST  12/2016    ESSENTIALLY NEG   • NECK SURGERY  2006    C5-C6   • TOTAL KNEE ARTHROPLASTY Right 08/28/2020    Procedure: TOTAL KNEE ARTHROPLASTY;  Surgeon: Dean Trinidad MD;  Location: [facility] MAIN OR;  Service: Orthopedics;  Laterality: Right;        Current Outpatient Medications on File Prior to Visit   Medication Sig Dispense Refill   • B Complex Vitamins (B-COMPLEX/B-12) tablet Take 1 tablet by mouth Daily.     • Calcium Carb-Cholecalciferol 600-200 MG-UNIT tablet Take 1 tablet by mouth Daily.     • esomeprazole (nexIUM) 20 MG capsule Take 1 capsule by mouth Daily.     • meloxicam (MOBIC) 15 MG tablet TAKE 1 TABLET BY MOUTH DAILY 30 tablet 6   • metoprolol succinate XL (TOPROL-XL) 25 MG 24 hr tablet Take 1 tablet by mouth Daily. 90 tablet 3   • Multiple Vitamins-Minerals (CENTRUM SILVER 50+WOMEN PO) Take 1 tablet by mouth Daily.     • pantoprazole (Protonix) 40 MG EC tablet Take 1 tablet by mouth 2 (Two) Times a Day Before Meals. 60 tablet 1   • Probiotic Product (ALIGN PO) Take 1 tablet by mouth Daily.     • rosuvastatin (CRESTOR) 5 MG tablet TAKE 1 TABLET BY MOUTH EVERY DAY 90 tablet 3   • Turmeric 500 MG capsule Take 1 tablet by mouth Daily.     • cetirizine (zyrTEC) 10 MG tablet Take 10 mg by  mouth Daily.     • Cholecalciferol (VITAMIN D3) 2000 UNITS capsule Take 2,000 Units by mouth daily.     • famciclovir (FAMVIR) 500 MG tablet Take 1 tablet by mouth 3 (Three) Times a Day. (Patient taking differently: Take 500 mg by mouth 3 (Three) Times a Day. PRN fever blisters) 21 tablet 1   • zoledronic acid (RECLAST) 5 MG/100ML solution infusion Infuse 5 mg into a venous catheter. Takes twice a year       No current facility-administered medications on file prior to visit.        ALLERGIES:    Allergies   Allergen Reactions   • Other Nausea And Vomiting     States all narcotics cause violent vomiting, states has tolerated toradol in the past   • Propoxyphene GI Intolerance   • Codeine Nausea And Vomiting   • Percocet [Oxycodone-Acetaminophen] Nausea And Vomiting        Social History     Socioeconomic History   • Marital status:    Tobacco Use   • Smoking status: Former     Years: 10.00     Types: Cigarettes     Quit date:      Years since quittin.9   • Smokeless tobacco: Never   • Tobacco comments:     SOCIAL, MAYBE 1 PACK PER WEEK PRACTICE FUSION SAYS NO SMOKING   Vaping Use   • Vaping Use: Never used   Substance and Sexual Activity   • Alcohol use: Yes     Alcohol/week: 7.0 standard drinks     Types: 7 Glasses of wine per week     Comment: 7 days per week   • Drug use: No   • Sexual activity: Defer        Family History   Problem Relation Age of Onset   • Arthritis Mother    • Hypertension Father    • Aneurysm Father    • Heart disease Father    • Other Father         mesothelioma   • Cancer Brother         colon, liver   • Malig Hyperthermia Neg Hx         Review of Systems   Cardiovascular: Positive for chest pain (Heartburn).   Genitourinary: Positive for frequency.   Musculoskeletal: Positive for arthralgias.   All other systems reviewed and are negative.       Objective     Vitals:    22 0914   BP: 142/93   Pulse: 83   Resp: 18   Temp: 97.8 °F (36.6 °C)   TempSrc: Temporal   SpO2:  "99%   Weight: 71.7 kg (158 lb 1.6 oz)   Height: 162.6 cm (64\")   PainSc: 0-No pain     Current Status 11/21/2022   ECOG score 0       Physical Exam    CONSTITUTIONAL:  Vital signs reviewed.  No distress, looks comfortable.  EYES:  Conjunctivae and lids unremarkable.  PERRLA  EARS,NOSE,MOUTH,THROAT:  Ears and nose appear unremarkable.  Lips, teeth, gums appear unremarkable.  RESPIRATORY:  Normal respiratory effort.  Lungs clear to auscultation bilaterally.  BREASTS: Bilateral reconstructions with reconstructed nipples no palpable masses  CARDIOVASCULAR:  Normal S1, S2.  No murmurs rubs or gallops.  No significant lower extremity edema.  GASTROINTESTINAL: Abdomen appears unremarkable.  Nontender.  No hepatomegaly.  No splenomegaly.  LYMPHATIC:  No cervical, supraclavicular, axillary lymphadenopathy.  SKIN:  Warm.  No rashes.  PSYCHIATRIC:  Normal judgment and insight.  Normal mood and affect.      RECENT LABS:  Hematology WBC   Date Value Ref Range Status   11/21/2022 5.53 3.40 - 10.80 10*3/mm3 Final   06/10/2022 6.3 3.4 - 10.8 x10E3/uL Final     RBC   Date Value Ref Range Status   11/21/2022 4.70 3.77 - 5.28 10*6/mm3 Final   06/10/2022 4.77 3.77 - 5.28 x10E6/uL Final     Hemoglobin   Date Value Ref Range Status   11/21/2022 14.0 12.0 - 15.9 g/dL Final     Hematocrit   Date Value Ref Range Status   11/21/2022 43.4 34.0 - 46.6 % Final     Platelets   Date Value Ref Range Status   11/21/2022 193 140 - 450 10*3/mm3 Final          Assessment & Plan   1.[id number] grade 2 invasive ductal carcinoma left breast  2002 strongly ER/AK positive HER2 2+ negative FISH post 4 cycles of Adriamycin Cytoxan after lumpectomy and axillary node dissection followed by radiation 3 years of tamoxifen and 2 years of Arimidex    2.pT1bN0 grade 2 invasive ductal and lobular carcinoma right breast 2007 post mastectomy and prophylactic left mastectomy with reconstruction treated with 5 years of Femara    3.  Positive family history of prostate " cancer in her brother lung cancer in her father no genetic testing done    4.  Osteoporosis on treatment    5.  Chronic back pain    Plan  1.  Genetic testing 47 gene because of her bilateral breast cancer and her brothers history of early prostate cancer which was aggressive    At this point she does not need follow-up for either breast cancer she is 20 years out from the high risk cancer in 2002 and 10 years out from the low risk cancer in 2007.    We will call her with the results of the genetic testing but do not need to follow at this time and she understands and is agreeable    I spent 50 total minutes, face-to-face, caring for Prema today.  Greater than 50% of this time involved counseling and/or coordination of care as documented within this note.

## 2022-11-22 DIAGNOSIS — R10.10 PAIN OF UPPER ABDOMEN: ICD-10-CM

## 2022-11-22 RX ORDER — PANTOPRAZOLE SODIUM 40 MG/1
40 TABLET, DELAYED RELEASE ORAL
Qty: 90 TABLET | Refills: 1 | Status: SHIPPED | OUTPATIENT
Start: 2022-11-22

## 2022-12-05 ENCOUNTER — OFFICE (OUTPATIENT)
Dept: URBAN - METROPOLITAN AREA CLINIC 66 | Facility: CLINIC | Age: 72
End: 2022-12-05

## 2022-12-05 ENCOUNTER — OFFICE VISIT (OUTPATIENT)
Dept: INTERNAL MEDICINE | Facility: CLINIC | Age: 72
End: 2022-12-05

## 2022-12-05 VITALS
TEMPERATURE: 95.8 F | BODY MASS INDEX: 26.12 KG/M2 | WEIGHT: 153 LBS | SYSTOLIC BLOOD PRESSURE: 104 MMHG | HEIGHT: 64 IN | OXYGEN SATURATION: 95 % | DIASTOLIC BLOOD PRESSURE: 72 MMHG | RESPIRATION RATE: 16 BRPM | HEART RATE: 106 BPM

## 2022-12-05 VITALS
HEART RATE: 103 BPM | WEIGHT: 153 LBS | DIASTOLIC BLOOD PRESSURE: 73 MMHG | HEIGHT: 64 IN | SYSTOLIC BLOOD PRESSURE: 107 MMHG

## 2022-12-05 DIAGNOSIS — K21.9 GASTRO-ESOPHAGEAL REFLUX DISEASE WITHOUT ESOPHAGITIS: ICD-10-CM

## 2022-12-05 DIAGNOSIS — R19.7 DIARRHEA, UNSPECIFIED: ICD-10-CM

## 2022-12-05 DIAGNOSIS — K59.00 CONSTIPATION, UNSPECIFIED: ICD-10-CM

## 2022-12-05 DIAGNOSIS — R35.0 URINARY FREQUENCY: Primary | ICD-10-CM

## 2022-12-05 LAB
BILIRUB BLD-MCNC: NEGATIVE MG/DL
CLARITY, POC: CLEAR
COLOR UR: YELLOW
EXPIRATION DATE: ABNORMAL
GLUCOSE UR STRIP-MCNC: NEGATIVE MG/DL
KETONES UR QL: NEGATIVE
LEUKOCYTE EST, POC: ABNORMAL
Lab: ABNORMAL
NITRITE UR-MCNC: NEGATIVE MG/ML
PH UR: 6 [PH] (ref 5–8)
PROT UR STRIP-MCNC: NEGATIVE MG/DL
RBC # UR STRIP: NEGATIVE /UL
SP GR UR: 1 (ref 1–1.03)
UROBILINOGEN UR QL: NORMAL

## 2022-12-05 PROCEDURE — 99213 OFFICE O/P EST LOW 20 MIN: CPT | Performed by: NURSE PRACTITIONER

## 2022-12-05 PROCEDURE — 99213 OFFICE O/P EST LOW 20 MIN: CPT | Performed by: INTERNAL MEDICINE

## 2022-12-05 PROCEDURE — 81003 URINALYSIS AUTO W/O SCOPE: CPT | Performed by: INTERNAL MEDICINE

## 2022-12-05 RX ORDER — CIPROFLOXACIN 500 MG/1
TABLET, FILM COATED ORAL
COMMUNITY
Start: 2022-12-05

## 2022-12-05 RX ORDER — CIPROFLOXACIN 500 MG/1
1000 TABLET, FILM COATED ORAL
Qty: 20 | Refills: 0 | Status: ACTIVE
Start: 2022-12-05

## 2022-12-05 NOTE — PROGRESS NOTES
"Chief Complaint  Urinary Frequency (X 3 days )    Subjective          Prema Hoang presents to [facility] for urinary frequency x 3 days. + dysuria. No hematuria. No fevers or chills. No back pain. No nausea or vomiting.     Objective   Vital Signs:     /72   Pulse 106   Temp 95.8 °F (35.4 °C)   Resp 16   Ht 162.6 cm (64\")   Wt 69.4 kg (153 lb)   SpO2 95%   BMI 26.26 kg/m²     Physical Exam  Vitals and nursing note reviewed.   Constitutional:       General: She is not in acute distress.     Appearance: Normal appearance.   Cardiovascular:      Rate and Rhythm: Normal rate and regular rhythm.      Pulses: Normal pulses.      Heart sounds: Normal heart sounds. No murmur heard.  Pulmonary:      Effort: Pulmonary effort is normal. No respiratory distress.      Breath sounds: Normal breath sounds.   Abdominal:      General: Abdomen is flat. Bowel sounds are normal.      Palpations: Abdomen is soft.      Tenderness: There is no abdominal tenderness.   Musculoskeletal:      Right lower leg: No edema.      Left lower leg: No edema.   Neurological:      Mental Status: She is alert and oriented to person, place, and time. Mental status is at baseline.   Psychiatric:         Mood and Affect: Mood normal.         Behavior: Behavior normal.          Brief Urine Lab Results  (Last result in the past 365 days)      Color   Clarity   Blood   Leuk Est   Nitrite   Protein   CREAT   Urine HCG        12/05/22 1326 Yellow   Clear   Negative   Small (1+)   Negative   Negative                 Result Review :   The following data was reviewed by: Ivanna Coates MD on 12/05/2022:  CMP    CMP 12/14/21 6/10/22 9/22/22   Glucose 139 (A) 105 (A)    BUN 19 24    Creatinine 0.84 0.74 1.00   eGFR Non African Am 67     Sodium 137 140    Potassium 4.3 4.5    Chloride 102 104    Calcium 9.7 9.9    Total Protein  6.5    Albumin 4.40 4.2    Globulin  2.3    Total Bilirubin 0.7 0.3  "   Alkaline Phosphatase 74 90    AST (SGOT) 39 (A) 32    ALT (SGPT) 31 30    (A) Abnormal value       Comments are available for some flowsheets but are not being displayed.           Renal Profile    Renal Profile 12/14/21 6/10/22 9/22/22   BUN 19 24    Creatinine 0.84 0.74 1.00   eGFR Non  Am 67        Comments are available for some flowsheets but are not being displayed.           BMP    BMP 12/14/21 6/10/22 9/22/22   BUN 19 24    Creatinine 0.84 0.74 1.00   Sodium 137 140    Potassium 4.3 4.5    Chloride 102 104    CO2 23.7 23    Calcium 9.7 9.9       Comments are available for some flowsheets but are not being displayed.           UA    Urinalysis 12/14/21 12/14/21 6/10/22 6/10/22 12/5/22    1226 1226 1500 1500    Squamous Epithelial Cells, UA  3-6 (A)      Specific Gravity, UA 1.015       Ketones, UA Negative    Negative   Blood, UA Negative  Negative     Leukocytes, UA Trace (A)  Trace (A)  Small (1+) (A)   Nitrite, UA Negative  Negative     RBC, UA  None Seen  None seen    WBC, UA  0-2 (A)      Bacteria, UA  None Seen  None seen    (A) Abnormal value            Urine Culture    Urine Culture 6/10/22   Urine Culture Final report (A)   (A) Abnormal value                 Assessment and Plan      Diagnoses and all orders for this visit:    1. Urinary frequency (Primary)   - UA with some signs of UTI present in office today, history c/w UTI, will send for culture   - pt supposed to start course of Cipro per GI, will empirically treat UTI as well based on most recent culture from 06/22   - increase fluids   - discussed topical estrogen as possible option to try to limit recurrent UTIs, however, pt with remote history of hormone receptor positive breast CA 20+ years ago, will discuss with oncology to see if this therapy is approved     Orders:  -     POCT urinalysis dipstick, automated  -     Urine Culture - Urine, Urine, Clean Catch          Follow Up   Return if symptoms worsen or fail to  improve.    Patient was given instructions and counseling regarding her condition or for health maintenance advice. Please see specific information pulled into the AVS if appropriate.     Chantal Coates MD  [facility] [place] [facility]  Phone: [phone]  Fax: [phone]

## 2022-12-07 LAB — REF LAB TEST RESULTS: NORMAL

## 2022-12-09 LAB
BACTERIA UR CULT: ABNORMAL
BACTERIA UR CULT: ABNORMAL
OTHER ANTIBIOTIC SUSC ISLT: ABNORMAL

## 2023-03-13 ENCOUNTER — TELEPHONE (OUTPATIENT)
Dept: INTERNAL MEDICINE | Facility: CLINIC | Age: 73
End: 2023-03-13
Payer: MEDICARE

## 2023-04-25 ENCOUNTER — OFFICE VISIT (OUTPATIENT)
Dept: INTERNAL MEDICINE | Facility: CLINIC | Age: 73
End: 2023-04-25
Payer: MEDICARE

## 2023-04-25 VITALS
WEIGHT: 158 LBS | TEMPERATURE: 96.4 F | HEIGHT: 64 IN | SYSTOLIC BLOOD PRESSURE: 124 MMHG | OXYGEN SATURATION: 98 % | RESPIRATION RATE: 16 BRPM | BODY MASS INDEX: 26.98 KG/M2 | DIASTOLIC BLOOD PRESSURE: 80 MMHG | HEART RATE: 112 BPM

## 2023-04-25 DIAGNOSIS — R30.0 BURNING WITH URINATION: ICD-10-CM

## 2023-04-25 DIAGNOSIS — K59.00 CONSTIPATION, UNSPECIFIED CONSTIPATION TYPE: ICD-10-CM

## 2023-04-25 DIAGNOSIS — N20.0 NEPHROLITHIASIS: Primary | ICD-10-CM

## 2023-04-25 LAB
BILIRUB BLD-MCNC: NEGATIVE MG/DL
CLARITY, POC: CLEAR
COLOR UR: YELLOW
EXPIRATION DATE: ABNORMAL
GLUCOSE UR STRIP-MCNC: NEGATIVE MG/DL
KETONES UR QL: NEGATIVE
LEUKOCYTE EST, POC: ABNORMAL
Lab: ABNORMAL
NITRITE UR-MCNC: NEGATIVE MG/ML
PH UR: 5 [PH] (ref 5–8)
PROT UR STRIP-MCNC: ABNORMAL MG/DL
RBC # UR STRIP: NEGATIVE /UL
SP GR UR: 1.01 (ref 1–1.03)
UROBILINOGEN UR QL: NORMAL

## 2023-04-25 RX ORDER — NITROFURANTOIN 25; 75 MG/1; MG/1
100 CAPSULE ORAL 2 TIMES DAILY
Qty: 10 CAPSULE | Refills: 0 | Status: SHIPPED | OUTPATIENT
Start: 2023-04-25

## 2023-04-25 RX ORDER — UBIDECARENONE 50 MG
CAPSULE ORAL DAILY
COMMUNITY

## 2023-04-25 RX ORDER — MONTELUKAST SODIUM 10 MG/1
10 TABLET ORAL
COMMUNITY
Start: 2023-02-22

## 2023-04-25 RX ORDER — MELATONIN
2000 DAILY
COMMUNITY

## 2023-04-25 NOTE — PROGRESS NOTES
"Chief Complaint  Foot Swelling, Rib Pain, burning with urination, and Bloated    Subjective          Prema Hoang presents to [facility] for discussion of several acute issues.   Was hospitalized in [place] for kidney stones and saw urology there. She has had repeat CT scan done since her original at the hospital on 4/2 but has not yet seen those results. She notes she is no longer in pain. She has developed some bloating and burning with urination.   She developed some swelling in her ankles in past 48 hours that is better today after wearing compression stockings, but did travel on Sunday that may have contributed.    Objective   Vital Signs:     /80   Pulse 112   Temp 96.4 °F (35.8 °C)   Resp 16   Ht 162.6 cm (64\")   Wt 71.7 kg (158 lb)   SpO2 98%   BMI 27.12 kg/m²     Physical Exam  Vitals and nursing note reviewed.   Constitutional:       General: She is not in acute distress.     Appearance: Normal appearance.   Cardiovascular:      Rate and Rhythm: Normal rate and regular rhythm.      Pulses: Normal pulses.      Heart sounds: Normal heart sounds. No murmur heard.  Pulmonary:      Effort: Pulmonary effort is normal. No respiratory distress.      Breath sounds: Normal breath sounds.   Abdominal:      General: Abdomen is flat. Bowel sounds are normal.      Palpations: Abdomen is soft.      Tenderness: There is no abdominal tenderness (mild suprapubic tenderness). There is no right CVA tenderness or left CVA tenderness.   Musculoskeletal:      Right lower leg: No edema.      Left lower leg: No edema.   Neurological:      Mental Status: She is alert and oriented to person, place, and time. Mental status is at baseline.   Psychiatric:         Mood and Affect: Mood normal.         Behavior: Behavior normal.          Brief Urine Lab Results  (Last result in the past 365 days)      Color   Clarity   Blood   Leuk Est   Nitrite   Protein   CREAT   Urine HCG     "    04/25/23 1425 Yellow   Clear   Negative   75 Gale/ul   Negative   Trace                   Result Review :   The following data was reviewed by: Ivanna Coates MD on 04/25/2023:  CMP        6/10/2022    15:00 9/22/2022    12:55   CMP   Glucose 105      BUN 24      Creatinine 0.74   1.00     Sodium 140      Potassium 4.5      Chloride 104      Calcium 9.9      Total Protein 6.5      Albumin 4.2      Globulin 2.3      Total Bilirubin 0.3      Alkaline Phosphatase 90      AST (SGOT) 32      ALT (SGPT) 30      BUN/Creatinine Ratio 32        CBC w/diff        6/10/2022    15:00 11/21/2022    09:06   CBC w/Diff   WBC 6.3   5.53     RBC 4.77   4.70     Hemoglobin 14.6   14.0     Hematocrit 43.1   43.4     MCV 90   92.3     MCH 30.6   29.8     MCHC 33.9   32.3     RDW 12.3   13.2     Platelets 260   193     Neutrophil Rel % 62   51.7     Immature Granulocyte Rel %  0.4     Lymphocyte Rel % 20   33.6     Monocyte Rel % 13   11.8     Eosinophil Rel % 3   1.8     Basophil Rel % 1   0.7       Lipid Panel        6/10/2022    15:00   Lipid Panel   Total Cholesterol 178     Triglycerides 94     HDL Cholesterol 61     VLDL Cholesterol 17     LDL Cholesterol  100           Most Recent A1C        6/10/2022    15:00   HGBA1C Most Recent   Hemoglobin A1C 6.0       Data reviewed: Radiologic studies CT abd/pelvis            Assessment and Plan      Diagnoses and all orders for this visit:    1. Nephrolithiasis (Primary)  2. Burning with urination   - will obtain records from most recent CT scan in [place] to see if stone is still present or has compeltely passed, may need referral on to urology here locally for ECSWL if still present   - UA in office today and symptoms consistent with possible post-obstructive UTI--> treat empirically with macrobid today   - culture sent     Orders:  -     POCT urinalysis dipstick, automated  -     Urinalysis With Microscopic - Urine, Clean Catch  -     Urine Culture - Urine, Urine, Clean Catch  -      nitrofurantoin, macrocrystal-monohydrate, (Macrobid) 100 MG capsule; Take 1 capsule by mouth 2 (Two) Times a Day.  Dispense: 10 capsule; Refill: 0    3. Constipation, unspecified constipation type   - discussed how constipation can be secondary to and cause for UTI   - pt is taking many OTC medications to try to assist but not fully successful and still quite bloated   - will paln to complete home bowel clean out with below instructions to see if this helps relieve her symptoms:    Home Bowel Cleanout Regimen    Start with 1 bottle of Mag Citrate, 30 mins later, begin Miralax 17 g(1 scoop) in 8 ounces of liquid hourly until stool is yellowish-green clear liquid.   If no stool within 3 hours of starting Miralax, you should get an OTC Fleets enema and then restart hourly Miralax dosing. If no stool in another 3 hours, please call back to office and will plan to send for AXR to ensure no obstruction or blockage.   Avoid red liquids and only clear liquids to eat while performing clean out.   Discussed importance of ongoing maintenance therapy for constipation to prevent colonic stretch and repeat of cycle and allow healing of healing to remodeling back to normal shape, size, and function.         Follow Up   Return in about 4 months (around 8/25/2023) for Medicare Wellness.    Patient was given instructions and counseling regarding her condition or for health maintenance advice. Please see specific information pulled into the AVS if appropriate.     Chantal Coates MD  [facility] [facility] [place] [facility]  Phone: [phone]  Fax: [phone]

## 2023-04-26 LAB
APPEARANCE UR: CLEAR
BACTERIA #/AREA URNS HPF: ABNORMAL /HPF
BILIRUB UR QL STRIP: NEGATIVE
CASTS URNS MICRO: ABNORMAL
COLOR UR: YELLOW
EPI CELLS #/AREA URNS HPF: ABNORMAL /HPF
GLUCOSE UR QL STRIP: NEGATIVE
HGB UR QL STRIP: NEGATIVE
KETONES UR QL STRIP: NEGATIVE
LEUKOCYTE ESTERASE UR QL STRIP: ABNORMAL
NITRITE UR QL STRIP: NEGATIVE
PH UR STRIP: 6 [PH] (ref 5–8)
PROT UR QL STRIP: NEGATIVE
RBC #/AREA URNS HPF: ABNORMAL /HPF
SP GR UR STRIP: 1.02 (ref 1–1.03)
UROBILINOGEN UR STRIP-MCNC: ABNORMAL MG/DL
WBC #/AREA URNS HPF: ABNORMAL /HPF

## 2023-04-28 LAB
BACTERIA UR CULT: ABNORMAL
BACTERIA UR CULT: ABNORMAL
OTHER ANTIBIOTIC SUSC ISLT: ABNORMAL

## 2023-04-30 RX ORDER — SULFAMETHOXAZOLE AND TRIMETHOPRIM 800; 160 MG/1; MG/1
1 TABLET ORAL 2 TIMES DAILY
Qty: 10 TABLET | Refills: 0 | Status: SHIPPED | OUTPATIENT
Start: 2023-04-30

## 2023-05-30 ENCOUNTER — OFFICE (OUTPATIENT)
Dept: URBAN - METROPOLITAN AREA CLINIC 66 | Facility: CLINIC | Age: 73
End: 2023-05-30

## 2023-05-30 VITALS
WEIGHT: 153 LBS | HEIGHT: 64 IN | HEART RATE: 82 BPM | DIASTOLIC BLOOD PRESSURE: 99 MMHG | SYSTOLIC BLOOD PRESSURE: 155 MMHG

## 2023-05-30 DIAGNOSIS — R14.0 ABDOMINAL DISTENSION (GASEOUS): ICD-10-CM

## 2023-05-30 DIAGNOSIS — K76.0 FATTY (CHANGE OF) LIVER, NOT ELSEWHERE CLASSIFIED: ICD-10-CM

## 2023-05-30 DIAGNOSIS — K21.9 GASTRO-ESOPHAGEAL REFLUX DISEASE WITHOUT ESOPHAGITIS: ICD-10-CM

## 2023-05-30 DIAGNOSIS — Z86.010 PERSONAL HISTORY OF COLONIC POLYPS: ICD-10-CM

## 2023-05-30 PROCEDURE — 99214 OFFICE O/P EST MOD 30 MIN: CPT | Performed by: NURSE PRACTITIONER

## 2023-06-13 RX ORDER — MELOXICAM 15 MG/1
TABLET ORAL
Qty: 30 TABLET | Refills: 6 | Status: SHIPPED | OUTPATIENT
Start: 2023-06-13

## 2023-06-20 ENCOUNTER — TELEPHONE (OUTPATIENT)
Dept: INTERNAL MEDICINE | Facility: CLINIC | Age: 73
End: 2023-06-20

## 2023-06-20 NOTE — TELEPHONE ENCOUNTER
"    Caller: Prema Hoang \"CARLOS\"    Relationship: Self    Best call back number:     Caller requesting test results:     What test was performed: CT SCAN OF STOMACH    When was the test performed: 06/08/23    Where was the test performed: [facility] FROM [facility]    Additional notes: PATIENT IS CALLING IN TO SEE IF HER CT SCAN RESULTS ARE IN YET AS SHE HAS NOT HEARD FROM ANYONE REGARDING THEM YET.         "

## 2023-08-10 ENCOUNTER — OFFICE (OUTPATIENT)
Dept: URBAN - METROPOLITAN AREA PATHOLOGY 4 | Facility: PATHOLOGY | Age: 73
End: 2023-08-10

## 2023-08-10 ENCOUNTER — AMBULATORY SURGICAL CENTER (OUTPATIENT)
Dept: URBAN - METROPOLITAN AREA SURGERY 20 | Facility: SURGERY | Age: 73
End: 2023-08-10

## 2023-08-10 VITALS — HEIGHT: 64 IN

## 2023-08-10 DIAGNOSIS — K31.7 POLYP OF STOMACH AND DUODENUM: ICD-10-CM

## 2023-08-10 DIAGNOSIS — K21.9 GASTRO-ESOPHAGEAL REFLUX DISEASE WITHOUT ESOPHAGITIS: ICD-10-CM

## 2023-08-10 DIAGNOSIS — Z86.010 PERSONAL HISTORY OF COLONIC POLYPS: ICD-10-CM

## 2023-08-10 DIAGNOSIS — K31.89 OTHER DISEASES OF STOMACH AND DUODENUM: ICD-10-CM

## 2023-08-10 DIAGNOSIS — K57.30 DIVERTICULOSIS OF LARGE INTESTINE WITHOUT PERFORATION OR ABS: ICD-10-CM

## 2023-08-10 DIAGNOSIS — K62.1 RECTAL POLYP: ICD-10-CM

## 2023-08-10 DIAGNOSIS — K44.9 DIAPHRAGMATIC HERNIA WITHOUT OBSTRUCTION OR GANGRENE: ICD-10-CM

## 2023-08-10 DIAGNOSIS — K64.0 FIRST DEGREE HEMORRHOIDS: ICD-10-CM

## 2023-08-10 DIAGNOSIS — K31.9 DISEASE OF STOMACH AND DUODENUM, UNSPECIFIED: ICD-10-CM

## 2023-08-10 LAB
GI HISTOLOGY: A. SELECT: (no result)
GI HISTOLOGY: B. SELECT: (no result)
GI HISTOLOGY: C. SELECT: (no result)
GI HISTOLOGY: D. SELECT: (no result)
GI HISTOLOGY: PDF REPORT: (no result)

## 2023-08-10 PROCEDURE — 43239 EGD BIOPSY SINGLE/MULTIPLE: CPT | Performed by: INTERNAL MEDICINE

## 2023-08-10 PROCEDURE — 88305 TISSUE EXAM BY PATHOLOGIST: CPT | Performed by: INTERNAL MEDICINE

## 2023-08-10 PROCEDURE — 45380 COLONOSCOPY AND BIOPSY: CPT | Mod: PT | Performed by: INTERNAL MEDICINE

## 2023-08-16 RX ORDER — ROSUVASTATIN CALCIUM 5 MG/1
TABLET, COATED ORAL
Qty: 90 TABLET | Refills: 3 | Status: SHIPPED | OUTPATIENT
Start: 2023-08-16

## 2023-08-29 ENCOUNTER — OFFICE VISIT (OUTPATIENT)
Dept: INTERNAL MEDICINE | Facility: CLINIC | Age: 73
End: 2023-08-29
Payer: MEDICARE

## 2023-08-29 ENCOUNTER — HOSPITAL ENCOUNTER (OUTPATIENT)
Dept: CARDIOLOGY | Facility: HOSPITAL | Age: 73
Discharge: HOME | End: 2023-08-29
Admitting: INTERNAL MEDICINE
Payer: MEDICARE

## 2023-08-29 VITALS
TEMPERATURE: 96.5 F | DIASTOLIC BLOOD PRESSURE: 96 MMHG | HEIGHT: 64 IN | BODY MASS INDEX: 26.12 KG/M2 | SYSTOLIC BLOOD PRESSURE: 152 MMHG | OXYGEN SATURATION: 99 % | HEART RATE: 82 BPM | WEIGHT: 153 LBS | RESPIRATION RATE: 16 BRPM

## 2023-08-29 VITALS
HEART RATE: 83 BPM | OXYGEN SATURATION: 98 % | WEIGHT: 153 LBS | DIASTOLIC BLOOD PRESSURE: 98 MMHG | HEIGHT: 65 IN | SYSTOLIC BLOOD PRESSURE: 158 MMHG | BODY MASS INDEX: 25.49 KG/M2

## 2023-08-29 DIAGNOSIS — R07.2 PRECORDIAL PAIN: Primary | ICD-10-CM

## 2023-08-29 DIAGNOSIS — Z90.13 H/O BILATERAL MASTECTOMY: ICD-10-CM

## 2023-08-29 DIAGNOSIS — I49.3 PVC (PREMATURE VENTRICULAR CONTRACTION): Primary | ICD-10-CM

## 2023-08-29 DIAGNOSIS — R00.2 PALPITATIONS: ICD-10-CM

## 2023-08-29 PROCEDURE — 94760 N-INVAS EAR/PLS OXIMETRY 1: CPT

## 2023-08-29 RX ORDER — METOPROLOL SUCCINATE 25 MG/1
25 TABLET, EXTENDED RELEASE ORAL DAILY
Qty: 90 TABLET | Refills: 3 | Status: SHIPPED | OUTPATIENT
Start: 2023-08-29

## 2023-08-29 RX ORDER — AMLODIPINE BESYLATE 2.5 MG/1
2.5 TABLET ORAL EVERY EVENING
Qty: 30 TABLET | Refills: 3 | Status: SHIPPED | OUTPATIENT
Start: 2023-08-29

## 2023-08-29 NOTE — LETTER
August 29, 2023       No Recipients    Patient: Prema Hoang   YOB: 1950   Date of Visit: 8/29/2023       Dear Ivanna Coates MD:    Thank you for referring Prema Hoang to me for evaluation. Below are the relevant portions of my assessment and plan of care.    Encounter Diagnosis and Orders:  Diagnoses and all orders for this visit:    1. PVC (premature ventricular contraction) (Primary)  -     Mobile Cardiac Outpatient Telemetry    2. Palpitations  -     Mobile Cardiac Outpatient Telemetry; Future    Other orders  -     Cardiac Monitoring; Standing  -     Pulse Oximetry, Spot; Standing  -     Vital Signs; Standing  -     Cardiac Monitoring  -     Pulse Oximetry, Spot  -     Vital Signs  -     amLODIPine (NORVASC) 2.5 MG tablet; Take 1 tablet by mouth Every Evening.  Dispense: 30 tablet; Refill: 3  -     metoprolol succinate XL (TOPROL-XL) 25 MG 24 hr tablet; Take 1 tablet by mouth Daily.  Dispense: 90 tablet; Refill: 3        If you have questions, please do not hesitate to call me. I look forward to following Prema along with you.         Sincerely,        SALMAG [employer]        CC:   No Recipients
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
## 2023-09-05 ENCOUNTER — OFFICE VISIT (OUTPATIENT)
Dept: CARDIOLOGY | Facility: CLINIC | Age: 73
End: 2023-09-05
Payer: MEDICARE

## 2023-09-05 VITALS
HEIGHT: 65 IN | HEART RATE: 83 BPM | OXYGEN SATURATION: 99 % | SYSTOLIC BLOOD PRESSURE: 130 MMHG | WEIGHT: 153 LBS | BODY MASS INDEX: 25.49 KG/M2 | DIASTOLIC BLOOD PRESSURE: 70 MMHG

## 2023-09-05 DIAGNOSIS — I49.3 PVC (PREMATURE VENTRICULAR CONTRACTION): ICD-10-CM

## 2023-09-05 DIAGNOSIS — I47.1 PSVT (PAROXYSMAL SUPRAVENTRICULAR TACHYCARDIA): ICD-10-CM

## 2023-09-05 DIAGNOSIS — D69.9 BLEEDING DISORDER: ICD-10-CM

## 2023-09-05 DIAGNOSIS — R07.89 CHEST TIGHTNESS: ICD-10-CM

## 2023-09-05 DIAGNOSIS — E78.49 OTHER HYPERLIPIDEMIA: Primary | ICD-10-CM

## 2023-09-05 DIAGNOSIS — T14.8XXA BRUISING: ICD-10-CM

## 2023-09-05 RX ORDER — AMLODIPINE BESYLATE 2.5 MG/1
2.5 TABLET ORAL EVERY EVENING
Qty: 90 TABLET | Refills: 2 | Status: SHIPPED | OUTPATIENT
Start: 2023-09-05

## 2023-09-05 NOTE — PROGRESS NOTES
"Date of Office Visit: 23  Encounter Provider: YOAV Gusman  Place of Service: [facility] CARDIOLOGY  Patient Name: Prema Hoang  :1950    Chief Complaint   Patient presents with    Hyperlipidemia    Hypertension    Follow-up    Rapid Heart Rate   :     HPI: Prema Hoang is a 73 y.o. female  with hypertension, hyperlipidemia, premature ventricular contractions      She is followed by Dr. Sarahy Hill.  I will visit her for the first time I have reviewed her medical record.      She was evaluated on  due to palpitations and chest pain.  She had been driving just prior to this episode and felt near syncopal with heart racing and chest tightness. She had no ischemic EKG changes but had elevated blood pressure so amlodipine 2.5 mg was added to her regimen.  She was also discharged with a mobile external telemetry.    She presents today for reassessment.  She had palpitation just today lasting just a couple seconds with mild dizziness.  She has no rafi syncope. She states occasionally when her heart races she has chest tightness or pressure.  She does stretching and home exercises 3 days a week and typically feels good with that.  She has bruising and believes she may have a bleeding disorder.  Allergies   Allergen Reactions    Other Nausea And Vomiting     States all narcotics cause violent vomiting, states has tolerated toradol in the past    Propoxyphene GI Intolerance    Codeine Nausea And Vomiting    Percocet [Oxycodone-Acetaminophen] Nausea And Vomiting           Family and social history reviewed.     ROS  All other systems were reviewed and are negative          Objective:     Vitals:    23 1109   BP: 130/70   BP Location: Left arm   Patient Position: Sitting   Pulse: 83   SpO2: 99%   Weight: 69.4 kg (153 lb)   Height: 165.1 cm (65\")     Body mass index is 25.46 kg/m².    PHYSICAL EXAM:  Cardiovascular:      Normal rate. Regular rhythm. "   Skin:     Findings: Bruising and ecchymosis present.       Procedures      Current Outpatient Medications   Medication Sig Dispense Refill    amLODIPine (NORVASC) 2.5 MG tablet Take 1 tablet by mouth Every Evening. 90 tablet 2    B Complex Vitamins (B-COMPLEX/B-12) tablet Take 1 tablet by mouth Daily.      Calcium Carb-Cholecalciferol 600-200 MG-UNIT tablet Take 1 tablet by mouth Daily.      cholecalciferol (VITAMIN D3) 25 MCG (1000 UT) tablet Take 2 tablets by mouth Daily.      coenzyme Q10 50 MG capsule capsule Take  by mouth Daily.      metoprolol succinate XL (TOPROL-XL) 25 MG 24 hr tablet Take 1 tablet by mouth Daily. 90 tablet 3    Multiple Vitamins-Minerals (CENTRUM SILVER 50+WOMEN PO) Take 1 tablet by mouth Daily.      Probiotic Product (ALIGN PO) Take 1 tablet by mouth Daily.      rosuvastatin (CRESTOR) 5 MG tablet TAKE 1 TABLET BY MOUTH EVERY DAY 90 tablet 3    Turmeric 500 MG capsule Take 1 capsule by mouth Daily.       No current facility-administered medications for this visit.     Assessment:       Diagnosis Plan   1. Other hyperlipidemia        2. PSVT (paroxysmal supraventricular tachycardia)        3. PVC (premature ventricular contraction)        4. Chest tightness  Stress Test With Myocardial Perfusion One Day           Orders Placed This Encounter   Procedures    Stress Test With Myocardial Perfusion One Day     Standing Status:   Future     Standing Expiration Date:   9/4/2024     Order Specific Question:   What stress agent will be used?     Answer:   Regadenoson (Lexiscan)     Order Specific Question:   Difficulty walking criteria?     Answer:   Poor Exercise Tolerance     Order Specific Question:   Reason for exam?     Answer:   Chest Pain     Order Specific Question:   Release to patient     Answer:   Routine Release [[id number]]         Plan:       1.  73-year-old female with hypertension-she brought a list of home blood pressure readings showing values anywhere from 103//90 but  also her values are less than 130/88.  Continue low-dose amlodipine along with metoprolol succinate  2.  Palpitations-currently wearing external mobile telemetry.  She will continue wearing this monitor until she returns for stress test  3.  Chest tightness and pressure-return for perfusion stress test, now walking protocol  4.  Hyperlipidemia continue rosuvastatin  5.  Arthritis-on turmeric (this could be contributing to easy bruising)  6.  GERD and hiatal hernia.  She has occasional belching and takes Nexium as needed  7.  Bruising and ecchymoses-could be due to thin skin related to aging and turmeric use however she would like hematology evaluation to rule out bleeding disorder. Referral placed     Follow-up in 6 months if perfusion stress test is negative and no significant arrhythmia noted on MCOT          It has been a pleasure to participate in this patient's care.      Thank you,  YOAV Gusman      **I used Dragon to dictate this note:**

## 2023-09-12 ENCOUNTER — HOSPITAL ENCOUNTER (OUTPATIENT)
Dept: CARDIOLOGY | Facility: HOSPITAL | Age: 73
Discharge: HOME | End: 2023-09-12
Admitting: NURSE PRACTITIONER
Payer: MEDICARE

## 2023-09-12 ENCOUNTER — TELEPHONE (OUTPATIENT)
Dept: CARDIOLOGY | Facility: CLINIC | Age: 73
End: 2023-09-12
Payer: MEDICARE

## 2023-09-12 VITALS — HEIGHT: 65 IN | BODY MASS INDEX: 25.71 KG/M2 | WEIGHT: 154.32 LBS

## 2023-09-12 DIAGNOSIS — R07.89 CHEST TIGHTNESS: ICD-10-CM

## 2023-09-12 LAB
BH CV NUCLEAR PRIOR STUDY: 2
BH CV REST NUCLEAR ISOTOPE DOSE: 27.3 MCI
BH CV STRESS BP STAGE 1: NORMAL
BH CV STRESS COMMENTS STAGE 1: NORMAL
BH CV STRESS DOSE REGADENOSON STAGE 1: 0.4
BH CV STRESS DURATION MIN STAGE 1: 0
BH CV STRESS DURATION SEC STAGE 1: 10
BH CV STRESS HR STAGE 1: 107
BH CV STRESS NUCLEAR ISOTOPE DOSE: 27.3 MCI
BH CV STRESS PROTOCOL 1: NORMAL
BH CV STRESS RECOVERY BP: NORMAL MMHG
BH CV STRESS RECOVERY HR: 96 BPM
BH CV STRESS STAGE 1: 1
LV EF NUC BP: 75 %
MAXIMAL PREDICTED HEART RATE: 146 BPM
PERCENT MAX PREDICTED HR: 73.29 %
STRESS BASELINE BP: NORMAL MMHG
STRESS BASELINE HR: 88 BPM
STRESS PERCENT HR: 86 %
STRESS POST EXERCISE DUR SEC: 10 SEC
STRESS POST PEAK BP: NORMAL MMHG
STRESS POST PEAK HR: 107 BPM
STRESS TARGET HR: 124 BPM

## 2023-09-12 PROCEDURE — A9555 RB82 RUBIDIUM: HCPCS | Performed by: NURSE PRACTITIONER

## 2023-09-12 PROCEDURE — 0 RUBIDIUM CHLORIDE: Performed by: NURSE PRACTITIONER

## 2023-09-12 PROCEDURE — 78492 MYOCRD IMG PET MLT RST&STRS: CPT

## 2023-09-12 PROCEDURE — 93017 CV STRESS TEST TRACING ONLY: CPT

## 2023-09-12 PROCEDURE — 25010000002 REGADENOSON 0.4 MG/5ML SOLUTION: Performed by: NURSE PRACTITIONER

## 2023-09-12 RX ORDER — REGADENOSON 0.08 MG/ML
0.4 INJECTION, SOLUTION INTRAVENOUS
Status: COMPLETED | OUTPATIENT
Start: 2023-09-12 | End: 2023-09-12

## 2023-09-12 RX ADMIN — REGADENOSON 0.4 MG: 0.08 INJECTION, SOLUTION INTRAVENOUS at 07:49

## 2023-09-12 NOTE — TELEPHONE ENCOUNTER
Notified patient of results/recommendations. Patient verbalized understanding.    Porsche Brunner RN  [place] [facility]

## 2023-09-12 NOTE — TELEPHONE ENCOUNTER
Please inform patient stress test shows no evidence of tightly blocked coronary artery, heart remains strong and this was low risk.  Okay to keep neck scheduled office follow-up

## 2023-09-21 ENCOUNTER — TRANSCRIBE ORDERS (OUTPATIENT)
Dept: ADMINISTRATIVE | Facility: HOSPITAL | Age: 73
End: 2023-09-21

## 2023-09-21 DIAGNOSIS — M81.0 SENILE OSTEOPOROSIS: Primary | ICD-10-CM

## 2023-09-27 RX ORDER — METOPROLOL SUCCINATE 25 MG/1
25 TABLET, EXTENDED RELEASE ORAL 2 TIMES DAILY
Qty: 90 TABLET | Refills: 3 | Status: SHIPPED | OUTPATIENT
Start: 2023-09-27

## 2023-10-25 NOTE — PROGRESS NOTES
"REFERRING PROVIDER:    Bambi Foster, APRN  [id number]  [facility] [address]  [place] [address]    REASON FOR CONSULTATION:    Easy bruising    HISTORY OF PRESENT ILLNESS:  Prema Hoang is a 73 y.o. female who is referred today for further evaluation of easy bruising.    Patient states that over the past 5 to 6 years she has had easy bruising on her arms and legs.  She denies any lifelong history of bleeding or bruising.  She denies any history of menorrhagia aside from later in life right before her hysterectomy.  She has had teeth extracted without problems.  She has had bilateral mastectomies and had no postoperative bleeding.    The patient states that about 5 or 6 years ago she started to notice some easy bruising on her arms and legs.  A couple of times per year at that time she was becoming ill.  She noticed this first several days after a violent episode of emesis.  She denies any other bleeding at this time.  No gingival bleeding.  No epistaxis.  Some of the bruising occurs with minor injuries but others appear spontaneously.    She has seen several dermatologists and has been told she has \"old skin.\"    No new medications.  She has taken Mobic for 40 years but has not been taking over the past couple of weeks since her COVID-19 diagnosis.  She is not on any antiplatelet drugs or anticoagulants otherwise.     No family history of bleeding disorders.      Past Medical History:   Diagnosis Date    Abnormal EKG     normal lexiscan per cardiology note lov 11/2021    Acute sinusitis     Arthritis     Breast cancer 2002, 2007    Bilateral-Lumpectomy with radiation and chemotherapy on the left followed by bilateral mastectomy 2007    Cervical vertebral fusion     Chest pain, unspecified     none recently, lov cardiology 11/2021    Chronic back pain     Condition not found     LOCALIZED ENLARGED LYMPH NODES    Condition not found 05/01/2017    ULTRASOUND ABD   NEG , GB POLYPS    Cystitis, unspecified without hematuria "     Environmental allergies     Essential (primary) hypertension     Fatty liver     Gallstones     GERD (gastroesophageal reflux disease)     Headache     High cholesterol     History of concussion 02/09/2020    History of kidney stones 1985    Hx of bone density study     Hx of CT scan of chest 08/01/2020    NEG    Hx of mammogram     Hyperlipidemia     Hypertension     pt states she doesn't have    Insomnia, unspecified     Kidney stones     Low back pain     Multiple bruises     ARMS, LEGS     Multiple skin tears     ARMS AND LEGS    BYRD (nonalcoholic steatohepatitis)     Osteoarthritis     hips and knees, spine    Osteoporosis     Other diseases of pharynx     Other fatigue     Other nonthrombocytopenic purpura     Pain in unspecified joint     PONV (postoperative nausea and vomiting)     Right knee pain     SVT (supraventricular tachycardia)     follows w/JAYLYN Damon 11/29/21    Thin skin     Unspecified abdominal pain     UTI (urinary tract infection)        Past Surgical History:   Procedure Laterality Date    ANTERIOR CERVICAL FUSION      ANTERIOR FUSION CERVICAL SPINE    BACK SURGERY  2004    L5S1    BREAST LUMPECTOMY Left 2002    BREAST RECONSTRUCTION Bilateral 11/2007    CATARACT EXTRACTION W/ INTRAOCULAR LENS  IMPLANT, BILATERAL      CERVICAL FUSION  2010    C5-C7    CHOLECYSTECTOMY N/A 12/22/2021    Procedure: CHOLECYSTECTOMY LAPAROSCOPIC;  Surgeon: Christopher Hirsch MD;  Location: [facility] OR;  Service: General;  Laterality: N/A;    COLONOSCOPY  2010    COMBINED HYSTERECTOMY VAGINAL / OOPHORECTOMY / A & P REPAIR  2002    HIP ARTHROPLASTY Left 04/2021    ball of hip replaced    MASTECTOMY Bilateral 2007    MRI INTRAOPERATIVE BRAIN W/ OR W/O CONTRAST  12/2016    ESSENTIALLY NEG    NECK SURGERY  2006    C5-C6    TOTAL KNEE ARTHROPLASTY Right 08/28/2020    Procedure: TOTAL KNEE ARTHROPLASTY;  Surgeon: Dean Trinidad MD;  Location: [facility] MAIN OR;  Service: Orthopedics;  Laterality: Right;       SOCIAL  "HISTORY:   reports that she quit smoking about 28 years ago. Her smoking use included cigarettes. She has never used smokeless tobacco. She reports current alcohol use of about 7.0 standard drinks of alcohol per week. She reports that she does not use drugs.    FAMILY HISTORY:  family history includes Aneurysm in her father; Arthritis in her mother; Cancer in her brother; Diabetes in her brother; Heart disease in her father; Hypertension in her father; Other in her father; Stroke in her father.    ALLERGIES:  Allergies   Allergen Reactions    Other Nausea And Vomiting     States all narcotics cause violent vomiting, states has tolerated toradol in the past    Propoxyphene GI Intolerance    Codeine Nausea And Vomiting    Percocet [Oxycodone-Acetaminophen] Nausea And Vomiting       MEDICATIONS:  The medication list has been reviewed with the patient by the medical assistant, and the list has been updated in the electronic medical record, which I reviewed.  Medication dosages and frequencies were confirmed to be accurate.    Review of Systems   Hematological:  Bruises/bleeds easily.   All other systems reviewed and are negative.      PHYSICAL EXAMINATION:  Vitals:    10/27/23 1455   BP: 122/80   Pulse: 83   Resp: 18   Temp: 97.3 °F (36.3 °C)   TempSrc: Temporal   SpO2: 97%   Weight: 68.9 kg (152 lb)   Height: 165.1 cm (65\")   PainSc: 0-No pain       Physical Exam  Vitals reviewed.   Constitutional:       Appearance: She is well-developed.   HENT:      Head: Normocephalic and atraumatic.      Nose: Nose normal.   Eyes:      Conjunctiva/sclera: Conjunctivae normal.      Pupils: Pupils are equal, round, and reactive to light.   Cardiovascular:      Rate and Rhythm: Normal rate and regular rhythm.      Heart sounds: Normal heart sounds, S1 normal and S2 normal. No murmur heard.     No friction rub. No gallop.   Pulmonary:      Effort: Pulmonary effort is normal. No respiratory distress.      Breath sounds: Normal breath " sounds. No stridor. No wheezing, rhonchi or rales.   Chest:      Chest wall: No tenderness.   Abdominal:      General: Bowel sounds are normal. There is no distension.      Palpations: Abdomen is soft. There is no mass.      Tenderness: There is no abdominal tenderness. There is no guarding or rebound.   Musculoskeletal:         General: Normal range of motion.      Cervical back: Neck supple.   Lymphadenopathy:      Cervical: No cervical adenopathy.      Upper Body:      Right upper body: No supraclavicular adenopathy.      Left upper body: No supraclavicular adenopathy.   Skin:     General: Skin is warm and dry.      Findings: No erythema or rash.      Comments: Multiple purpuric lesions present on the proximal and distal upper and lower extremities.  There is a more severe area on the anterior left tibia[place]   Neurological:      Mental Status: She is alert and oriented to person, place, and time.      Cranial Nerves: No cranial nerve deficit.      Sensory: No sensory deficit.   Psychiatric:         Behavior: Behavior normal.         Thought Content: Thought content normal.         Judgment: Judgment normal.         DIAGNOSTIC DATA:  CBC & Differential (10/27/2023 14:27)     IMAGING:    None reviewed    ASSESSMENT AND PLAN:  This is a 73 y.o. female with:    *Easy bruising  This seems a little severe to be related to senile purpura.  She has been on Mobic for decades and this can certainly be contributing.  I do not think any other medications are necessarily contributing.  She has not had any other bleeding issues throughout her life so I do not believe that she has an inherited bleeding disorder, but we will send a laboratory evaluation as noted below for further evaluation.  I will notify her of laboratory results and plan to see her back here as needed.      ORDERS PLACED DURING THIS ENCOUNTER  Orders Placed This Encounter   Procedures    SCANNED - LABS    Protime-INR      Order Specific Question:   Is the Patient on Coumadin (Warfarin) therapy?     Answer:   No     Order Specific Question:   Release to patient     Answer:   Routine Release [[id number]]    aPTT     Order Specific Question:   Patient Receiving Heparin Therapy?     Answer:   No     Order Specific Question:   Release to patient     Answer:   Routine Release [[id number]]    Fibrinogen     Order Specific Question:   Release to patient     Answer:   Routine Release [[id number]]    Platelet Function Test     Order Specific Question:   Release to patient     Answer:   Routine Release [[id number]]    Von Willebrand Panel     Order Specific Question:   Release to patient     Answer:   Routine Release [[id number]]    Thrombin Time     Order Specific Question:   Release to patient     Answer:   Routine Release [[id number]]    CBC & Differential     Standing Status:   Future     Number of Occurrences:   1     Standing Expiration Date:   10/25/2024     Order Specific Question:   Manual Differential     Answer:   No     Order Specific Question:   Release to patient     Answer:   Routine Release [[id number]]

## 2023-10-26 RX ORDER — ZOLEDRONIC ACID 5 MG/100ML
5 INJECTION, SOLUTION INTRAVENOUS ONCE
Status: CANCELLED | OUTPATIENT
Start: 2023-10-30

## 2023-10-27 ENCOUNTER — CONSULT (OUTPATIENT)
Dept: ONCOLOGY | Facility: CLINIC | Age: 73
End: 2023-10-27
Payer: MEDICARE

## 2023-10-27 ENCOUNTER — LAB (OUTPATIENT)
Dept: OTHER | Facility: HOSPITAL | Age: 73
End: 2023-10-27
Payer: MEDICARE

## 2023-10-27 VITALS
SYSTOLIC BLOOD PRESSURE: 122 MMHG | WEIGHT: 152 LBS | BODY MASS INDEX: 25.33 KG/M2 | DIASTOLIC BLOOD PRESSURE: 80 MMHG | HEIGHT: 65 IN | HEART RATE: 83 BPM | OXYGEN SATURATION: 97 % | TEMPERATURE: 97.3 F | RESPIRATION RATE: 18 BRPM

## 2023-10-27 DIAGNOSIS — D69.9 BLEEDING DISORDER: ICD-10-CM

## 2023-10-27 DIAGNOSIS — R23.3 EASY BRUISING: ICD-10-CM

## 2023-10-27 DIAGNOSIS — D69.2 SENILE PURPURA: ICD-10-CM

## 2023-10-27 DIAGNOSIS — D69.9 BLEEDING DISORDER: Primary | ICD-10-CM

## 2023-10-27 LAB
APTT PPP: 31 SECONDS (ref 22.7–35.4)
BASOPHILS # BLD AUTO: 0.04 10*3/MM3 (ref 0–0.2)
BASOPHILS NFR BLD AUTO: 0.4 % (ref 0–1.5)
CLOSURE TME COLL+ADP BLD: 72 SECONDS (ref 52–122)
CLOSURE TME COLL+EPINEP BLD: 91 SECONDS (ref 68–148)
DEPRECATED RDW RBC AUTO: 44.6 FL (ref 37–54)
EOSINOPHIL # BLD AUTO: 0.06 10*3/MM3 (ref 0–0.4)
EOSINOPHIL NFR BLD AUTO: 0.7 % (ref 0.3–6.2)
ERYTHROCYTE [DISTWIDTH] IN BLOOD BY AUTOMATED COUNT: 13.8 % (ref 12.3–15.4)
FIBRINOGEN PPP-MCNC: 497 MG/DL (ref 219–464)
HCT VFR BLD AUTO: 41.2 % (ref 34–46.6)
HGB BLD-MCNC: 13.5 G/DL (ref 12–15.9)
IMM GRANULOCYTES # BLD AUTO: 0.08 10*3/MM3 (ref 0–0.05)
IMM GRANULOCYTES NFR BLD AUTO: 0.9 % (ref 0–0.5)
INR PPP: 1.07 (ref 0.9–1.1)
LYMPHOCYTES # BLD AUTO: 1.37 10*3/MM3 (ref 0.7–3.1)
LYMPHOCYTES NFR BLD AUTO: 15 % (ref 19.6–45.3)
MCH RBC QN AUTO: 29.6 PG (ref 26.6–33)
MCHC RBC AUTO-ENTMCNC: 32.8 G/DL (ref 31.5–35.7)
MCV RBC AUTO: 90.4 FL (ref 79–97)
MONOCYTES # BLD AUTO: 0.86 10*3/MM3 (ref 0.1–0.9)
MONOCYTES NFR BLD AUTO: 9.4 % (ref 5–12)
NEUTROPHILS NFR BLD AUTO: 6.7 10*3/MM3 (ref 1.7–7)
NEUTROPHILS NFR BLD AUTO: 73.6 % (ref 42.7–76)
NRBC BLD AUTO-RTO: 0.2 /100 WBC (ref 0–0.2)
PLATELET # BLD AUTO: 168 10*3/MM3 (ref 140–450)
PMV BLD AUTO: 11.4 FL (ref 6–12)
PROTHROMBIN TIME: 14 SECONDS (ref 11.7–14.2)
RBC # BLD AUTO: 4.56 10*6/MM3 (ref 3.77–5.28)
THROMBIN TIME: 15.4 SECONDS (ref 15.7–20.4)
WBC NRBC COR # BLD: 9.11 10*3/MM3 (ref 3.4–10.8)

## 2023-10-27 PROCEDURE — 85025 COMPLETE CBC W/AUTO DIFF WBC: CPT | Performed by: INTERNAL MEDICINE

## 2023-10-27 PROCEDURE — 85576 BLOOD PLATELET AGGREGATION: CPT | Performed by: INTERNAL MEDICINE

## 2023-10-27 PROCEDURE — 85730 THROMBOPLASTIN TIME PARTIAL: CPT | Performed by: INTERNAL MEDICINE

## 2023-10-27 PROCEDURE — 85384 FIBRINOGEN ACTIVITY: CPT | Performed by: INTERNAL MEDICINE

## 2023-10-27 PROCEDURE — 85245 CLOT FACTOR VIII VW RISTOCTN: CPT | Performed by: INTERNAL MEDICINE

## 2023-10-27 PROCEDURE — 85240 CLOT FACTOR VIII AHG 1 STAGE: CPT | Performed by: INTERNAL MEDICINE

## 2023-10-27 PROCEDURE — 85670 THROMBIN TIME PLASMA: CPT | Performed by: INTERNAL MEDICINE

## 2023-10-27 PROCEDURE — 85610 PROTHROMBIN TIME: CPT | Performed by: INTERNAL MEDICINE

## 2023-10-27 PROCEDURE — 85246 CLOT FACTOR VIII VW ANTIGEN: CPT | Performed by: INTERNAL MEDICINE

## 2023-10-27 PROCEDURE — 36415 COLL VENOUS BLD VENIPUNCTURE: CPT | Performed by: INTERNAL MEDICINE

## 2023-10-27 RX ORDER — PANTOPRAZOLE SODIUM 40 MG/1
TABLET, DELAYED RELEASE ORAL
COMMUNITY

## 2023-10-27 RX ORDER — MELOXICAM 7.5 MG/1
TABLET ORAL
COMMUNITY
End: 2023-10-27

## 2023-10-27 RX ORDER — SULFAMETHOXAZOLE AND TRIMETHOPRIM 800; 160 MG/1; MG/1
1 TABLET ORAL 2 TIMES DAILY
COMMUNITY
End: 2023-10-27

## 2023-10-27 NOTE — LETTER
"October 27, 2023       No Recipients    Patient: Prema Hoang   YOB: 1950   Date of Visit: 10/27/2023     Dear YOAV Gusman:       Thank you for referring Prema Hoang to me for evaluation. Below are the relevant portions of my assessment and plan of care.    If you have questions, please do not hesitate to call me. I look forward to following Prema along with you.         Sincerely,        Liang Hirsch MD        CC:   No Recipients    Liang Hirsch MD  10/27/23 1617  Sign when Signing Visit  REFERRING PROVIDER:    Bambi Foster APRN  [address] [place] [address]    REASON FOR CONSULTATION:    Easy bruising    HISTORY OF PRESENT ILLNESS:  Prema Hoang is a 73 y.o. female who is referred today for further evaluation of easy bruising.    Patient states that over the past 5 to 6 years she has had easy bruising on her arms and legs.  She denies any lifelong history of bleeding or bruising.  She denies any history of menorrhagia aside from later in life right before her hysterectomy.  She has had teeth extracted without problems.  She has had bilateral mastectomies and had no postoperative bleeding.    The patient states that about 5 or 6 years ago she started to notice some easy bruising on her arms and legs.  A couple of times per year at that time she was becoming ill.  She noticed this first several days after a violent episode of emesis.  She denies any other bleeding at this time.  No gingival bleeding.  No epistaxis.  Some of the bruising occurs with minor injuries but others appear spontaneously.    She has seen several dermatologists and has been told she has \"old skin.\"    No new medications.  She has taken Mobic for 40 years but has not been taking over the past couple of weeks since her COVID-19 diagnosis.  She is not on any antiplatelet drugs or anticoagulants otherwise.     No family history of bleeding disorders.      Past Medical History:   Diagnosis Date   • " Abnormal EKG     normal lexiscan per cardiology note lov 11/2021   • Acute sinusitis    • Arthritis    • Breast cancer 2002, 2007    Bilateral-Lumpectomy with radiation and chemotherapy on the left followed by bilateral mastectomy 2007   • Cervical vertebral fusion    • Chest pain, unspecified     none recently, lov cardiology 11/2021   • Chronic back pain    • Condition not found     LOCALIZED ENLARGED LYMPH NODES   • Condition not found 05/01/2017    ULTRASOUND ABD   NEG , GB POLYPS   • Cystitis, unspecified without hematuria    • Environmental allergies    • Essential (primary) hypertension    • Fatty liver    • Gallstones    • GERD (gastroesophageal reflux disease)    • Headache    • High cholesterol    • History of concussion 02/09/2020   • History of kidney stones 1985   • Hx of bone density study    • Hx of CT scan of chest 08/01/2020    NEG   • Hx of mammogram    • Hyperlipidemia    • Hypertension     pt states she doesn't have   • Insomnia, unspecified    • Kidney stones    • Low back pain    • Multiple bruises     ARMS, LEGS    • Multiple skin tears     ARMS AND LEGS   • BYRD (nonalcoholic steatohepatitis)    • Osteoarthritis     hips and knees, spine   • Osteoporosis    • Other diseases of pharynx    • Other fatigue    • Other nonthrombocytopenic purpura    • Pain in unspecified joint    • PONV (postoperative nausea and vomiting)    • Right knee pain    • SVT (supraventricular tachycardia)     follows w/Dr Campos, LOV 11/29/21   • Thin skin    • Unspecified abdominal pain    • UTI (urinary tract infection)        Past Surgical History:   Procedure Laterality Date   • ANTERIOR CERVICAL FUSION      ANTERIOR FUSION CERVICAL SPINE   • BACK SURGERY  2004    L5S1   • BREAST LUMPECTOMY Left 2002   • BREAST RECONSTRUCTION Bilateral 11/2007   • CATARACT EXTRACTION W/ INTRAOCULAR LENS  IMPLANT, BILATERAL     • CERVICAL FUSION  2010    C5-C7   • CHOLECYSTECTOMY N/A 12/22/2021    Procedure: CHOLECYSTECTOMY  "LAPAROSCOPIC;  Surgeon: Christopher Hirsch MD;  Location: [facility] OR;  Service: General;  Laterality: N/A;   • COLONOSCOPY  2010   • COMBINED HYSTERECTOMY VAGINAL / OOPHORECTOMY / A & P REPAIR  2002   • HIP ARTHROPLASTY Left 04/2021    ball of hip replaced   • MASTECTOMY Bilateral 2007   • MRI INTRAOPERATIVE BRAIN W/ OR W/O CONTRAST  12/2016    ESSENTIALLY NEG   • NECK SURGERY  2006    C5-C6   • TOTAL KNEE ARTHROPLASTY Right 08/28/2020    Procedure: TOTAL KNEE ARTHROPLASTY;  Surgeon: Dean Trinidad MD;  Location: [facility] MAIN OR;  Service: Orthopedics;  Laterality: Right;       SOCIAL HISTORY:   reports that she quit smoking about 28 years ago. Her smoking use included cigarettes. She has never used smokeless tobacco. She reports current alcohol use of about 7.0 standard drinks of alcohol per week. She reports that she does not use drugs.    FAMILY HISTORY:  family history includes Aneurysm in her father; Arthritis in her mother; Cancer in her brother; Diabetes in her brother; Heart disease in her father; Hypertension in her father; Other in her father; Stroke in her father.    ALLERGIES:  Allergies   Allergen Reactions   • Other Nausea And Vomiting     States all narcotics cause violent vomiting, states has tolerated toradol in the past   • Propoxyphene GI Intolerance   • Codeine Nausea And Vomiting   • Percocet [Oxycodone-Acetaminophen] Nausea And Vomiting       MEDICATIONS:  The medication list has been reviewed with the patient by the medical assistant, and the list has been updated in the electronic medical record, which I reviewed.  Medication dosages and frequencies were confirmed to be accurate.    Review of Systems   Hematological:  Bruises/bleeds easily.   All other systems reviewed and are negative.      PHYSICAL EXAMINATION:  Vitals:    10/27/23 1455   BP: 122/80   Pulse: 83   Resp: 18   Temp: 97.3 °F (36.3 °C)   TempSrc: Temporal   SpO2: 97%   Weight: 68.9 kg (152 lb)   Height: 165.1 cm (65\")   PainSc: 0-No " pain       Physical Exam  Vitals reviewed.   Constitutional:       Appearance: She is well-developed.   HENT:      Head: Normocephalic and atraumatic.      Nose: Nose normal.   Eyes:      Conjunctiva/sclera: Conjunctivae normal.      Pupils: Pupils are equal, round, and reactive to light.   Cardiovascular:      Rate and Rhythm: Normal rate and regular rhythm.      Heart sounds: Normal heart sounds, S1 normal and S2 normal. No murmur heard.     No friction rub. No gallop.   Pulmonary:      Effort: Pulmonary effort is normal. No respiratory distress.      Breath sounds: Normal breath sounds. No stridor. No wheezing, rhonchi or rales.   Chest:      Chest wall: No tenderness.   Abdominal:      General: Bowel sounds are normal. There is no distension.      Palpations: Abdomen is soft. There is no mass.      Tenderness: There is no abdominal tenderness. There is no guarding or rebound.   Musculoskeletal:         General: Normal range of motion.      Cervical back: Neck supple.   Lymphadenopathy:      Cervical: No cervical adenopathy.      Upper Body:      Right upper body: No supraclavicular adenopathy.      Left upper body: No supraclavicular adenopathy.   Skin:     General: Skin is warm and dry.      Findings: No erythema or rash.      Comments: Multiple purpuric lesions present on the proximal and distal upper and lower extremities.  There is a more severe area on the anterior left tibia[place]   Neurological:      Mental Status: She is alert and oriented to person, place, and time.      Cranial Nerves: No cranial nerve deficit.      Sensory: No sensory deficit.   Psychiatric:         Behavior: Behavior normal.         Thought Content: Thought content normal.         Judgment: Judgment normal.         DIAGNOSTIC DATA:  CBC & Differential (10/27/2023 14:27)     IMAGING:    None reviewed    ASSESSMENT AND PLAN:  This is a 73 y.o. female with:    *Easy bruising  This seems a little severe  to be related to senile purpura.  She has been on Mobic for decades and this can certainly be contributing.  I do not think any other medications are necessarily contributing.  She has not had any other bleeding issues throughout her life so I do not believe that she has an inherited bleeding disorder, but we will send a laboratory evaluation as noted below for further evaluation.  I will notify her of laboratory results and plan to see her back here as needed.      ORDERS PLACED DURING THIS ENCOUNTER  Orders Placed This Encounter   Procedures   • SCANNED - LABS   • Protime-INR     Order Specific Question:   Is the Patient on Coumadin (Warfarin) therapy?     Answer:   No     Order Specific Question:   Release to patient     Answer:   Routine Release [[id number]]   • aPTT     Order Specific Question:   Patient Receiving Heparin Therapy?     Answer:   No     Order Specific Question:   Release to patient     Answer:   Routine Release [[id number]]   • Fibrinogen     Order Specific Question:   Release to patient     Answer:   Routine Release [[id number]]   • Platelet Function Test     Order Specific Question:   Release to patient     Answer:   Routine Release [[id number]]   • Von Willebrand Panel     Order Specific Question:   Release to patient     Answer:   Routine Release [[id number]]   • Thrombin Time     Order Specific Question:   Release to patient     Answer:   Routine Release [[id number]]   • CBC & Differential     Standing Status:   Future     Number of Occurrences:   1     Standing Expiration Date:   10/25/2024     Order Specific Question:   Manual Differential     Answer:   No     Order Specific Question:   Release to patient     Answer:   Routine Release [[id number]]

## 2023-10-29 LAB
FACT VIII ACT/NOR PPP: 260 % (ref 56–140)
PATH INTERP BLD-IMP: NORMAL
VWF AG ACT/NOR PPP IA: 366 % (ref 50–200)
VWF:RCO ACT/NOR PPP PL AGG: 273 % (ref 50–200)

## 2023-10-30 ENCOUNTER — HOSPITAL ENCOUNTER (OUTPATIENT)
Dept: INFUSION THERAPY | Facility: HOSPITAL | Age: 73
Discharge: HOME | End: 2023-10-30
Admitting: NURSE PRACTITIONER
Payer: MEDICARE

## 2023-10-30 VITALS
OXYGEN SATURATION: 100 % | HEART RATE: 83 BPM | BODY MASS INDEX: 25.13 KG/M2 | RESPIRATION RATE: 16 BRPM | DIASTOLIC BLOOD PRESSURE: 87 MMHG | SYSTOLIC BLOOD PRESSURE: 135 MMHG | WEIGHT: 151 LBS | TEMPERATURE: 96.6 F

## 2023-10-30 DIAGNOSIS — M81.0 SENILE OSTEOPOROSIS: Primary | ICD-10-CM

## 2023-10-30 LAB
CALCIUM SPEC-SCNC: 9.5 MG/DL (ref 8.6–10.5)
CREAT SERPL-MCNC: 0.7 MG/DL (ref 0.57–1)
EGFRCR SERPLBLD CKD-EPI 2021: 90.9 ML/MIN/1.73

## 2023-10-30 PROCEDURE — 82565 ASSAY OF CREATININE: CPT | Performed by: NURSE PRACTITIONER

## 2023-10-30 PROCEDURE — 25010000002 ZOLEDRONIC ACID 5 MG/100ML SOLUTION: Performed by: NURSE PRACTITIONER

## 2023-10-30 PROCEDURE — 96374 THER/PROPH/DIAG INJ IV PUSH: CPT

## 2023-10-30 PROCEDURE — 82310 ASSAY OF CALCIUM: CPT | Performed by: NURSE PRACTITIONER

## 2023-10-30 PROCEDURE — 96365 THER/PROPH/DIAG IV INF INIT: CPT

## 2023-10-30 PROCEDURE — 36415 COLL VENOUS BLD VENIPUNCTURE: CPT

## 2023-10-30 RX ORDER — ZOLEDRONIC ACID 5 MG/100ML
5 INJECTION, SOLUTION INTRAVENOUS ONCE
Status: CANCELLED | OUTPATIENT
Start: 2023-10-30

## 2023-10-30 RX ORDER — ZOLEDRONIC ACID 5 MG/100ML
5 INJECTION, SOLUTION INTRAVENOUS ONCE
Status: COMPLETED | OUTPATIENT
Start: 2023-10-30 | End: 2023-10-30

## 2023-10-30 RX ADMIN — ZOLEDRONIC ACID 5 MG: 0.05 INJECTION, SOLUTION INTRAVENOUS at 11:22

## 2023-11-10 ENCOUNTER — OFFICE VISIT (OUTPATIENT)
Dept: INTERNAL MEDICINE | Facility: CLINIC | Age: 73
End: 2023-11-10
Payer: MEDICARE

## 2023-11-10 VITALS
SYSTOLIC BLOOD PRESSURE: 132 MMHG | TEMPERATURE: 96.7 F | OXYGEN SATURATION: 98 % | HEIGHT: 65 IN | DIASTOLIC BLOOD PRESSURE: 86 MMHG | RESPIRATION RATE: 16 BRPM | WEIGHT: 154 LBS | HEART RATE: 90 BPM | BODY MASS INDEX: 25.66 KG/M2

## 2023-11-10 DIAGNOSIS — N39.0 RECURRENT UTI: Primary | ICD-10-CM

## 2023-11-10 DIAGNOSIS — L02.32 BOIL OF BUTTOCK: ICD-10-CM

## 2023-11-10 LAB
BILIRUB BLD-MCNC: NEGATIVE MG/DL
CLARITY, POC: CLEAR
COLOR UR: YELLOW
EXPIRATION DATE: ABNORMAL
GLUCOSE UR STRIP-MCNC: NEGATIVE MG/DL
KETONES UR QL: NEGATIVE
LEUKOCYTE EST, POC: ABNORMAL
Lab: ABNORMAL
NITRITE UR-MCNC: NEGATIVE MG/ML
PH UR: 6 [PH] (ref 5–8)
PROT UR STRIP-MCNC: NEGATIVE MG/DL
RBC # UR STRIP: NEGATIVE /UL
SP GR UR: 1.02 (ref 1–1.03)
UROBILINOGEN UR QL: NORMAL

## 2023-11-10 RX ORDER — SULFAMETHOXAZOLE AND TRIMETHOPRIM 800; 160 MG/1; MG/1
1 TABLET ORAL 2 TIMES DAILY
Qty: 14 TABLET | Refills: 0 | Status: SHIPPED | OUTPATIENT
Start: 2023-11-10

## 2023-11-10 NOTE — PROGRESS NOTES
"Chief Complaint  recurrent UTI    Subjective          Prema Hoang presents to [facility] for recurrent UTI. Has a spot on the R side of her buttocks, first noticed 4 days ago, painful when she moves. Is red and inflammed when she looked in the mirror. No drainage. Now also burning with urination on the outside of her vaginal opening when she urinates.     Objective   Vital Signs:     /86   Pulse 90   Temp 96.7 °F (35.9 °C)   Resp 16   Ht 165.1 cm (65\")   Wt 69.9 kg (154 lb)   SpO2 98%   BMI 25.63 kg/m²     Physical Exam  Vitals and nursing note reviewed.   Constitutional:       General: She is not in acute distress.     Appearance: Normal appearance.   Cardiovascular:      Rate and Rhythm: Normal rate and regular rhythm.      Pulses: Normal pulses.      Heart sounds: Normal heart sounds. No murmur heard.  Pulmonary:      Effort: Pulmonary effort is normal. No respiratory distress.      Breath sounds: Normal breath sounds.   Abdominal:      General: Abdomen is flat. Bowel sounds are normal.      Palpations: Abdomen is soft.      Tenderness: There is no abdominal tenderness.   Musculoskeletal:      Right lower leg: No edema.      Left lower leg: No edema.   Skin:     Comments: 1 cm erythematous raised papule on erythematous base with superficial desquamation, no induration, no central dimpling, no drainable fluid collection   Neurological:      Mental Status: She is alert and oriented to person, place, and time. Mental status is at baseline.   Psychiatric:         Mood and Affect: Mood normal.         Behavior: Behavior normal.          Brief Urine Lab Results  (Last result in the past 365 days)        Color   Clarity   Blood   Leuk Est   Nitrite   Protein   CREAT   Urine HCG        11/10/23 1048 Yellow   Clear   Negative   75 Gale/ul   Negative   Negative                     Result Review :   The following data was reviewed by: Ivanna Coates MD on " 11/10/2023:  CMP          10/30/2023    10:39   CMP   Creatinine 0.70    EGFR 90.9    Calcium 9.5      CBC w/diff          11/21/2022    09:06 10/27/2023    14:27   CBC w/Diff   WBC 5.53  9.11    RBC 4.70  4.56    Hemoglobin 14.0  13.5    Hematocrit 43.4  41.2    MCV 92.3  90.4    MCH 29.8  29.6    MCHC 32.3  32.8    RDW 13.2  13.8    Platelets 193  168    Neutrophil Rel % 51.7  73.6    Immature Granulocyte Rel % 0.4  0.9    Lymphocyte Rel % 33.6  15.0    Monocyte Rel % 11.8  9.4    Eosinophil Rel % 1.8  0.7    Basophil Rel % 0.7  0.4           Assessment and Plan      Diagnoses and all orders for this visit:    1. Recurrent UTI (Primary)  -     POCT urinalysis dipstick, automated  -     Urine Culture - Urine, Urine, Clean Catch  -     sulfamethoxazole-trimethoprim (BACTRIM DS,SEPTRA DS) 800-160 MG per tablet; Take 1 tablet by mouth 2 (Two) Times a Day.  Dispense: 14 tablet; Refill: 0    2. Boil of buttock  -     sulfamethoxazole-trimethoprim (BACTRIM DS,SEPTRA DS) 800-160 MG per tablet; Take 1 tablet by mouth 2 (Two) Times a Day.  Dispense: 14 tablet; Refill: 0  -     mupirocin (BACTROBAN) 2 % ointment; Apply 1 application  topically to the appropriate area as directed 3 (Three) Times a Day.  Dispense: 30 g; Refill: 0      72 yo F here with several days of painful raised area on her L buttocks with appearance of boil. Will treat with bactrim as above and cover topically with mupirocin. Should also cover possible UTI but suspect her burning sensation may be more to do with atrophy and fragile skin than true UTI. Pending urine culture.     Follow Up   Return if symptoms worsen or fail to improve.    Patient was given instructions and counseling regarding her condition or for health maintenance advice. Please see specific information pulled into the AVS if appropriate.     Chantal Coates MD  [facility]  Phone: [phone]  Fax: [phone]

## 2023-11-12 LAB
BACTERIA UR CULT: NORMAL
BACTERIA UR CULT: NORMAL

## 2023-11-27 RX ORDER — MELOXICAM 15 MG/1
TABLET ORAL
Qty: 30 TABLET | Refills: 6 | Status: SHIPPED | OUTPATIENT
Start: 2023-11-27

## 2023-12-29 RX ORDER — AMLODIPINE BESYLATE 2.5 MG/1
2.5 TABLET ORAL EVERY EVENING
Qty: 30 TABLET | Refills: 1 | Status: SHIPPED | OUTPATIENT
Start: 2023-12-29

## 2024-04-05 ENCOUNTER — TELEPHONE (OUTPATIENT)
Dept: CARDIOLOGY | Facility: CLINIC | Age: 74
End: 2024-04-05

## 2024-05-01 RX ORDER — FAMCICLOVIR 500 MG/1
500 TABLET ORAL 3 TIMES DAILY
Qty: 21 TABLET | Refills: 1 | Status: SHIPPED | OUTPATIENT
Start: 2024-05-01

## 2024-07-10 ENCOUNTER — OFFICE VISIT (OUTPATIENT)
Dept: FAMILY MEDICINE CLINIC | Facility: CLINIC | Age: 74
End: 2024-07-10
Payer: MEDICARE

## 2024-07-10 VITALS
WEIGHT: 150 LBS | OXYGEN SATURATION: 96 % | HEART RATE: 87 BPM | SYSTOLIC BLOOD PRESSURE: 107 MMHG | HEIGHT: 65 IN | BODY MASS INDEX: 24.99 KG/M2 | RESPIRATION RATE: 16 BRPM | DIASTOLIC BLOOD PRESSURE: 61 MMHG

## 2024-07-10 DIAGNOSIS — K14.6 BURNING TONGUE SYNDROME: Primary | ICD-10-CM

## 2024-07-10 DIAGNOSIS — R05.3 CHRONIC COUGH: ICD-10-CM

## 2024-07-10 DIAGNOSIS — K21.9 GASTROESOPHAGEAL REFLUX DISEASE WITHOUT ESOPHAGITIS: ICD-10-CM

## 2024-07-10 DIAGNOSIS — K14.6 TONGUE PAIN: ICD-10-CM

## 2024-07-10 DIAGNOSIS — L29.9 PRURITUS, UNSPECIFIED: ICD-10-CM

## 2024-07-10 RX ORDER — LANOLIN ALCOHOL/MO/W.PET/CERES
1000 CREAM (GRAM) TOPICAL DAILY
COMMUNITY

## 2024-07-10 RX ORDER — CETIRIZINE HYDROCHLORIDE 10 MG/1
1 CAPSULE, LIQUID FILLED ORAL DAILY
COMMUNITY

## 2024-07-10 NOTE — PROGRESS NOTES
The ABCs of the Annual Wellness Visit  Subsequent Medicare Wellness Visit       Subjective     Prema Hoang is a 74 y.o. female who presents for a Subsequent Medicare Wellness Visit.    The following portions of the patient's history were reviewed and   updated as appropriate: allergies, current medications, past family history, past medical history, past social history, past surgical history, and problem list.    Compared to one year ago, the patient feels her physical   health is the same.    Compared to one year ago, the patient feels her mental   health is the same.    Recent Hospitalizations:  She was not admitted to the hospital during the last year.       Current Medical Providers:  Patient Care Team:  Lia Tanner MD as PCP - General (Family Medicine)  Kem Ryan MD as Consulting Physician (Hematology and Oncology)  Liang Hirsch MD as Consulting Physician (Hematology and Oncology)  Bambi Foster APRN as Referring Physician (Cardiology)    Gabriel Grey - Oral surgery - no longer following  Pineda Tate DMD - Dentist  Porsche José - Women's Health  Jens Doty MD - Gastroenterology  Susie Chavarria MD - Dermatology  [facility] (Orthopedics- hip)  Raheel Chiang MD - Consulting physician (orthopedics - shoulder)  Edmundo Ni - Podiatry    Outpatient Medications Prior to Visit   Medication Sig Dispense Refill    B Complex Vitamins (B-COMPLEX/B-12) tablet Take 1 tablet by mouth Daily.      Cetirizine HCl (ZyrTEC Allergy) 10 MG capsule Take 1 capsule by mouth Daily.      cholecalciferol (VITAMIN D3) 25 MCG (1000 UT) tablet Take 2 tablets by mouth Daily.      coenzyme Q10 50 MG capsule capsule Take  by mouth Daily.      famciclovir (FAMVIR) 500 MG tablet TAKE 1 TABLET BY MOUTH THREE TIMES DAILY 21 tablet 1    metoprolol succinate XL (TOPROL-XL) 25 MG 24 hr tablet Take 1 tablet by mouth 2 (Two) Times a Day. (Patient taking differently: Take 2 tablets  by mouth Daily.) 90 tablet 3    Multiple Vitamins-Minerals (CENTRUM SILVER 50+WOMEN PO) Take 1 tablet by mouth Daily.      Probiotic Product (ALIGN PO) Take 1 tablet by mouth Daily.      rosuvastatin (CRESTOR) 5 MG tablet TAKE 1 TABLET BY MOUTH EVERY DAY 90 tablet 3    Turmeric 500 MG capsule Take 1 capsule by mouth Daily.      vitamin B-12 (CYANOCOBALAMIN) 1000 MCG tablet Take 1 tablet by mouth Daily.      Zoledronic Acid (RECLAST IV)       Calcium Carb-Cholecalciferol 600-200 MG-UNIT tablet Take 1 tablet by mouth Daily. (Patient not taking: Reported on 7/10/2024)      meloxicam (MOBIC) 15 MG tablet TAKE 1 TABLET BY MOUTH DAILY (Patient not taking: Reported on 7/10/2024) 30 tablet 6    metoprolol tartrate (LOPRESSOR) 25 MG tablet Take 1 tablet by mouth 2 (Two) Times a Day. (Patient not taking: Reported on 7/10/2024)      mupirocin (BACTROBAN) 2 % ointment Apply 1 application  topically to the appropriate area as directed 3 (Three) Times a Day. (Patient not taking: Reported on 7/10/2024) 30 g 0    amLODIPine (NORVASC) 2.5 MG tablet TAKE 1 TABLET BY MOUTH EVERY EVENING (Patient not taking: Reported on 7/10/2024) 30 tablet 1    pantoprazole (PROTONIX) 40 MG EC tablet       sulfamethoxazole-trimethoprim (BACTRIM DS,SEPTRA DS) 800-160 MG per tablet Take 1 tablet by mouth 2 (Two) Times a Day. 14 tablet 0     No facility-administered medications prior to visit.       No opioid medication identified on active medication list. I have reviewed chart for other potential  high risk medication/s and harmful drug interactions in the elderly.        Aspirin is not on active medication list.  Aspirin use is not indicated based on review of current medical condition/s. Risk of harm outweighs potential benefits.  .    Patient Active Problem List   Diagnosis    Right upper quadrant pain    Abnormal liver enzymes    Abnormal magnetic resonance imaging study    Arthritis    Hereditary essential tremor    Gastroesophageal reflux disease  "without esophagitis    Headache    Other hyperlipidemia    Impaired fasting glucose    Osteoarthritis    Osteoporosis    Nonalcoholic steatohepatitis (BYRD)    Chronic pain of both shoulders    Bilateral chronic knee pain    Rotator cuff tear arthropathy of both shoulders    Chronic back pain    Bilateral shoulder pain    Bilateral primary osteoarthritis of knee    Nontraumatic subluxation of extensor tendon at metacarpophalangeal joint of right hand    Polyarthralgia    AC (acromioclavicular) arthritis    Acquired absence of genital organ    Asymptomatic postmenopausal status    Disorder of bone and cartilage    H/O bilateral mastectomy    Hx antineoplastic chemotherapy    Hx of fusion of cervical spine    Other postprocedural status(V45.89)    Personal history of malignant neoplasm of breast    Prophylactic use of aromatase inhibitors    Post-operative state    Sebaceous cyst    Shoulder impingement    Trigger ring finger of right hand    Rotator cuff arthropathy of both shoulders    Chronic pain of both knees    Primary osteoarthritis of right knee    Vomiting    Abnormal EKG    Senile osteoporosis    Status post total right knee replacement    Primary osteoarthritis of left knee    Primary hypertension    Calculus of gallbladder without cholecystitis without obstruction    PVC (premature ventricular contraction)    Senile purpura    Easy bruising    Tongue pain    Chronic cough     Advance Care Planning   Advance Care Planning     Advance Directive is not on file.  ACP discussion was held with the patient during this visit. Patient has an advance directive (not in EMR), copy requested.     Objective    Vitals:    07/10/24 1029   BP: 107/61   Pulse: 87   Resp: 16   SpO2: 96%   Weight: 68 kg (150 lb)   Height: 165.1 cm (65\")     Estimated body mass index is 24.96 kg/m² as calculated from the following:    Height as of this encounter: 165.1 cm (65\").    Weight as of this encounter: 68 kg (150 lb).    BMI is within " normal parameters. No other follow-up for BMI required.      Does the patient have evidence of cognitive impairment? No          HEALTH RISK ASSESSMENT    Smoking Status:  Social History     Tobacco Use   Smoking Status Former    Current packs/day: 0.00    Average packs/day: 0.3 packs/day for 10.0 years (2.5 ttl pk-yrs)    Types: Cigarettes    Start date:     Quit date:     Years since quittin.5    Passive exposure: Past   Smokeless Tobacco Never   Tobacco Comments    SOCIAL, MAYBE 1 PACK PER WEEK PRACTICE FUSION SAYS NO SMOKING     Alcohol Consumption:  Social History     Substance and Sexual Activity   Alcohol Use Yes    Alcohol/week: 7.0 standard drinks of alcohol    Types: 7 Glasses of wine per week    Comment: 7 days per week     Fall Risk Screen:    MCKENZIE Fall Risk Assessment was completed, and patient is at LOW risk for falls.Assessment completed on:7/10/2024    Depression Screenin/10/2024    10:40 AM   PHQ-2/PHQ-9 Depression Screening   Little Interest or Pleasure in Doing Things 0-->not at all   Feeling Down, Depressed or Hopeless 0-->not at all   PHQ-9: Brief Depression Severity Measure Score 0       Health Habits and Functional and Cognitive Screenin/10/2024    10:00 AM   Functional & Cognitive Status   Do you have difficulty preparing food and eating? No   Do you have difficulty bathing yourself, getting dressed or grooming yourself? No   Do you have difficulty using the toilet? No   Do you have difficulty moving around from place to place? No   Do you have trouble with steps or getting out of a bed or a chair? No   Current Diet Well Balanced Diet   Dental Exam Up to date   Eye Exam Up to date   Exercise (times per week) 7 times per week   Current Exercises Include Walking;Aerobics   Do you need help using the phone?  No   Are you deaf or do you have serious difficulty hearing?  No   Do you need help to go to places out of walking distance? No   Do you need help shopping?  No   Do you need help preparing meals?  No   Do you need help with housework?  No   Do you need help with laundry? No   Do you need help taking your medications? No   Do you need help managing money? No   Do you ever drive or ride in a car without wearing a seat belt? No   Have you felt unusual stress, anger or loneliness in the last month? No   Who do you live with? Spouse   If you need help, do you have trouble finding someone available to you? No   Have you been bothered in the last four weeks by sexual problems? No   Do you have difficulty concentrating, remembering or making decisions? No       Age-appropriate Screening Schedule:  Refer to the list below for future screening recommendations based on patient's age, sex and/or medical conditions. Orders for these recommended tests are listed in the plan section. The patient has been provided with a written plan.    Health Maintenance   Topic Date Due    RSV Vaccine - Adults (1 - 1-dose 60+ series) Never done    ZOSTER VACCINE (2 of 2) 01/16/2019    Pneumococcal Vaccine 65+ (3 of 3 - PPSV23 or PCV20) 10/23/2021    ANNUAL WELLNESS VISIT  12/19/2021    COVID-19 Vaccine (6 - 2023-24 season) 09/01/2023    DXA SCAN  09/16/2024    INFLUENZA VACCINE  08/01/2024    LIPID PANEL  04/09/2025    TDAP/TD VACCINES (4 - Td or Tdap) 04/13/2031    COLORECTAL CANCER SCREENING  08/10/2033    HEPATITIS C SCREENING  Completed                Stairs - painful with left hip, will be following up with Ortho to talk about re-replacing the hip.   Hip limits walking - getting injections.     CMS Preventative Services Quick Reference  Risk Factors Identified During Encounter  None Identified  The above risks/problems have been discussed with the patient.  Pertinent information has been shared with the patient in the After Visit Summary.  An After Visit Summary and PPPS were made available to the patient.    Follow Up:   Next Medicare Wellness visit to be scheduled in 1 year.       Additional  "E&M Note during same encounter follows:  Patient has multiple medical problems which are significant and separately identifiable that require additional work above and beyond the Medicare Wellness Visit.      Chief Complaint  Establish Care and Medicare Wellness-subsequent    Subjective        HPI  Prema Hoang is also being seen today for Burning Tongue/tongue pain and cough.         Objective   Vital Signs:  /61   Pulse 87   Resp 16   Ht 165.1 cm (65\")   Wt 68 kg (150 lb)   SpO2 96%   BMI 24.96 kg/m²     Physical Exam  Constitutional:       Appearance: Normal appearance.   HENT:      Head: Normocephalic and atraumatic.      Nose: Nose normal.      Mouth/Throat:      Lips: No lesions.      Mouth: Mucous membranes are moist. No injury or oral lesions.      Dentition: No gum lesions.      Tongue: No lesions.      Pharynx: No pharyngeal swelling or posterior oropharyngeal erythema.      Tonsils: No tonsillar exudate.      Comments: Fissured tongue noted  Eyes:      General: Lids are normal.      Conjunctiva/sclera: Conjunctivae normal.   Cardiovascular:      Rate and Rhythm: Normal rate and regular rhythm.      Pulses: Normal pulses.      Heart sounds: Normal heart sounds.   Pulmonary:      Effort: Pulmonary effort is normal.   Musculoskeletal:      Cervical back: Normal range of motion.   Skin:     General: Skin is warm and dry.   Neurological:      General: No focal deficit present.      Mental Status: She is alert and oriented to person, place, and time.      Gait: Gait is intact.   Psychiatric:         Mood and Affect: Mood normal.         Behavior: Behavior normal.         Thought Content: Thought content normal.                         Assessment and Plan   Diagnoses and all orders for this visit:    1. Burning tongue syndrome (Primary)  -     Vitamin B12  -     Comprehensive metabolic panel  -     Iron Profile  -     Folate  -     Zinc  -     Vitamin B6  -     XR Chest PA & Lateral; Future    2. " Pruritus, unspecified  -     Iron Profile    3. Tongue pain  Assessment & Plan:  Tongue started burning about a month ago. Has also noted white spots on the sides. Also has pain/burning on inside of lips. Has not tried to rub off spots. Peroxyl seems to help.     No plaques seen on exam.      4. Gastroesophageal reflux disease without esophagitis    5. Chronic cough  Assessment & Plan:  Pt reports initially she is no longer taking her PPI, then states she is taking it at half the recommended dose. She is concerned about its effects on her bone density as she has osteoporosis.     Counseled cough may be related to untreated GERD. Recommend taking PPI as prescribed. UTD on EGD.       Other orders  -     Specimen Status Report  -     Request Problem             Follow Up   No follow-ups on file.  Patient was given instructions and counseling regarding her condition or for health maintenance advice. Please see specific information pulled into the AVS if appropriate.

## 2024-07-10 NOTE — ASSESSMENT & PLAN NOTE
Tongue started burning about a month ago. Has also noted white spots on the sides. Also has pain/burning on inside of lips. Has not tried to rub off spots. Peroxyl seems to help.     No plaques seen on exam.    Check for potential related vitamin deficiencies, will refer to ENT.

## 2024-07-11 LAB
ALBUMIN SERPL-MCNC: 4.5 G/DL (ref 3.8–4.8)
ALP SERPL-CCNC: 83 IU/L (ref 44–121)
ALT SERPL-CCNC: 26 IU/L (ref 0–32)
AST SERPL-CCNC: 37 IU/L (ref 0–40)
BILIRUB SERPL-MCNC: 0.7 MG/DL (ref 0–1.2)
BUN SERPL-MCNC: 12 MG/DL (ref 8–27)
BUN/CREAT SERPL: 15 (ref 12–28)
CALCIUM SERPL-MCNC: 10.6 MG/DL (ref 8.7–10.3)
CHLORIDE SERPL-SCNC: 102 MMOL/L (ref 96–106)
CO2 SERPL-SCNC: 23 MMOL/L (ref 20–29)
CREAT SERPL-MCNC: 0.79 MG/DL (ref 0.57–1)
EGFRCR SERPLBLD CKD-EPI 2021: 78 ML/MIN/1.73
FOLATE SERPL-MCNC: >20 NG/ML
GLOBULIN SER CALC-MCNC: 2.4 G/DL (ref 1.5–4.5)
GLUCOSE SERPL-MCNC: 116 MG/DL (ref 70–99)
IRON SATN MFR SERPL: 19 % (ref 15–55)
IRON SERPL-MCNC: 67 UG/DL (ref 27–139)
POTASSIUM SERPL-SCNC: 5.3 MMOL/L (ref 3.5–5.2)
PROT SERPL-MCNC: 6.9 G/DL (ref 6–8.5)
PYRIDOXAL PHOS SERPL-MCNC: NORMAL UG/L
REQUEST PROBLEM: NORMAL
SODIUM SERPL-SCNC: 141 MMOL/L (ref 134–144)
SPECIMEN STATUS: NORMAL
TIBC SERPL-MCNC: 344 UG/DL (ref 250–450)
UIBC SERPL-MCNC: 277 UG/DL (ref 118–369)
VIT B12 SERPL-MCNC: >2000 PG/ML (ref 232–1245)
ZINC SERPL-MCNC: NORMAL UG/DL

## 2024-07-12 ENCOUNTER — PATIENT ROUNDING (BHMG ONLY) (OUTPATIENT)
Dept: FAMILY MEDICINE CLINIC | Facility: CLINIC | Age: 74
End: 2024-07-12
Payer: MEDICARE

## 2024-07-12 NOTE — PROGRESS NOTES
Good morning Mrs. Hoang,    My name is Vanessa, I am a medical assistant here at Dr. Tanner's office. We hope your recent visit with us went well.     If you have any questions or concerns, please feel free to reach out to the office at [phone]. Thank you and have a great day.    Vanessa Roque MA

## 2024-07-13 PROBLEM — R05.3 CHRONIC COUGH: Status: ACTIVE | Noted: 2024-07-13

## 2024-07-13 NOTE — ASSESSMENT & PLAN NOTE
Pt reports initially she is no longer taking her PPI, then states she is taking it at half the recommended dose. She is concerned about its effects on her bone density as she has osteoporosis.     Counseled cough may be related to untreated GERD. Recommend taking PPI as prescribed. UTD on EGD.

## 2024-07-17 DIAGNOSIS — E83.52 HYPERCALCEMIA: Primary | ICD-10-CM

## 2024-07-17 DIAGNOSIS — K14.6 BURNING TONGUE: ICD-10-CM

## 2024-07-18 ENCOUNTER — HOSPITAL ENCOUNTER (OUTPATIENT)
Dept: GENERAL RADIOLOGY | Facility: HOSPITAL | Age: 74
Discharge: HOME | End: 2024-07-18
Payer: MEDICARE

## 2024-07-18 ENCOUNTER — LAB (OUTPATIENT)
Dept: LAB | Facility: HOSPITAL | Age: 74
End: 2024-07-18
Payer: MEDICARE

## 2024-07-18 DIAGNOSIS — K14.6 BURNING TONGUE SYNDROME: ICD-10-CM

## 2024-07-18 DIAGNOSIS — K14.6 BURNING TONGUE: ICD-10-CM

## 2024-07-18 DIAGNOSIS — E83.52 HYPERCALCEMIA: ICD-10-CM

## 2024-07-18 LAB
25(OH)D3 SERPL-MCNC: 50.9 NG/ML (ref 30–100)
ALBUMIN SERPL-MCNC: 4.2 G/DL (ref 3.5–5.2)
ALBUMIN/GLOB SERPL: 1.6 G/DL
ALP SERPL-CCNC: 85 U/L (ref 39–117)
ALT SERPL W P-5'-P-CCNC: 27 U/L (ref 1–33)
ANION GAP SERPL CALCULATED.3IONS-SCNC: 7.6 MMOL/L (ref 5–15)
AST SERPL-CCNC: 35 U/L (ref 1–32)
BILIRUB SERPL-MCNC: 0.5 MG/DL (ref 0–1.2)
BUN SERPL-MCNC: 13 MG/DL (ref 8–23)
BUN/CREAT SERPL: 15.7 (ref 7–25)
CALCIUM SPEC-SCNC: 10.3 MG/DL (ref 8.6–10.5)
CHLORIDE SERPL-SCNC: 106 MMOL/L (ref 98–107)
CO2 SERPL-SCNC: 26.4 MMOL/L (ref 22–29)
CREAT SERPL-MCNC: 0.83 MG/DL (ref 0.57–1)
EGFRCR SERPLBLD CKD-EPI 2021: 73.6 ML/MIN/1.73
GLOBULIN UR ELPH-MCNC: 2.6 GM/DL
GLUCOSE SERPL-MCNC: 122 MG/DL (ref 65–99)
POTASSIUM SERPL-SCNC: 5.3 MMOL/L (ref 3.5–5.2)
PROT SERPL-MCNC: 6.8 G/DL (ref 6–8.5)
SODIUM SERPL-SCNC: 140 MMOL/L (ref 136–145)

## 2024-07-18 PROCEDURE — 36415 COLL VENOUS BLD VENIPUNCTURE: CPT

## 2024-07-18 PROCEDURE — 80053 COMPREHEN METABOLIC PANEL: CPT

## 2024-07-18 PROCEDURE — 71046 X-RAY EXAM CHEST 2 VIEWS: CPT

## 2024-07-18 PROCEDURE — 84207 ASSAY OF VITAMIN B-6: CPT

## 2024-07-18 PROCEDURE — 84630 ASSAY OF ZINC: CPT

## 2024-07-18 PROCEDURE — 82306 VITAMIN D 25 HYDROXY: CPT

## 2024-07-22 LAB — PYRIDOXAL PHOS SERPL-MCNC: 41.1 UG/L (ref 3.4–65.2)

## 2024-07-23 ENCOUNTER — TELEPHONE (OUTPATIENT)
Dept: CARDIOLOGY | Facility: CLINIC | Age: 74
End: 2024-07-23

## 2024-07-23 LAB — ZINC SERPL-MCNC: 102 UG/DL (ref 44–115)

## 2024-07-23 NOTE — TELEPHONE ENCOUNTER
"  Caller: Prema Hoang \"CARLOS\"    Relationship: Self    Best call back number: [phone]      PATIENT IS REQUESTING A CALL BACK/ FOLLOW UP FOR THE TEST RESULTS FROM 7.18.24    PLEASE CALL THE PATIENT BACK  "

## 2024-07-24 NOTE — TELEPHONE ENCOUNTER
"Patient is calling to see if Dr. Hill can look at CXR done on 7/18.  Report is in epic.  Specifically, she wants to know what \"atherosclerotic calcification of the aorta\" means and what needs to be done with that finding?          LOV 9/5/23 with AL.  She sees a cardiologist during her time spent in [place] during the winter, but she will in [place] until September.    Linda Berry RN  [facility] Triage  07/24/24 09:12 EDT    "

## 2024-07-24 NOTE — TELEPHONE ENCOUNTER
I reviewed the chest x-ray images.  It shows some age-related calcification/atherosclerosis around the aorta.  She had a normal stress test about a year ago.  If she has a change in her symptoms lets get her in to see Bambi.  Otherwise, she can see her cardiologist in [place].  Nothing acute from a heart standpoint.

## 2024-07-24 NOTE — TELEPHONE ENCOUNTER
Notified patient of results/recommendations. Patient verbalized understanding.    Porsche Brunner RN  Triage [facility]

## 2024-07-29 DIAGNOSIS — K14.6 BURNING TONGUE: Primary | ICD-10-CM

## 2024-07-29 NOTE — PROGRESS NOTES
Called patient, verified full name and date of birth.  Discussed normal results with patient.  Metabolic panel shows some very mild derangements, such as stable but very mildly elevated potassium and mild elevation in AST.  Zinc, vitamin B6, vitamin D all normal.  Referral placed to ENT as discussed at her appointment.

## 2024-08-22 RX ORDER — ROSUVASTATIN CALCIUM 5 MG/1
TABLET, COATED ORAL
Qty: 90 TABLET | Refills: 3 | OUTPATIENT
Start: 2024-08-22

## 2024-08-22 NOTE — TELEPHONE ENCOUNTER
I called Pt to ask if she is still seeing Cardiologist in [place]. She says that She is now back in [place]. I told her that [facility]. She told me that She is not taking Crestor at this time and She is not sure why the request was sent, However This medication is listed on her current med list.

## 2024-08-28 ENCOUNTER — TRANSCRIBE ORDERS (OUTPATIENT)
Dept: PHYSICAL THERAPY | Facility: CLINIC | Age: 74
End: 2024-08-28
Payer: MEDICARE

## 2024-08-28 DIAGNOSIS — M47.816 LUMBAR SPONDYLOSIS: ICD-10-CM

## 2024-08-28 DIAGNOSIS — M54.50 LUMBAR PAIN: Primary | ICD-10-CM

## 2024-09-05 ENCOUNTER — TREATMENT (OUTPATIENT)
Dept: PHYSICAL THERAPY | Facility: CLINIC | Age: 74
End: 2024-09-05
Payer: MEDICARE

## 2024-09-05 DIAGNOSIS — M47.816 LUMBAR SPONDYLOSIS: Primary | ICD-10-CM

## 2024-09-05 NOTE — PROGRESS NOTES
Physical Therapy Initial Evaluation and Plan of Care  [facility] - [facility]  [address], [address]  [place] [address]    Patient: Prema Hoang   : 1950  Diagnosis/ICD-10 Code:  Lumbar spondylosis [M47.816]  Referring practitioner: Merry Solano,*  Today's Date: 2024    Subjective Evaluation    History of Present Illness  Mechanism of injury:  had Lumbar discectomy  3 years ago had partial hip replacement - femoral component  Has had 14 laser treatments - but has had no improvement with the treatments - was doing it for hip bursitis  Right TKR  - doing well  Developed LBP 2024 after playing golf in [place]  Had sharp pain when she twisted - no specific treatment except for Meloxicam and uses heat  Has been off/on since then but increased with activity so saw YOAV from [facility] [place] - xrays - DDD and ordered MRI  MRI to be done next week   Plays golf, can't walk well due to left hip pain    Pain  Current pain ratin  At best pain ratin  At worst pain rating: 10  Location: left posteiror lateral hip and left lower back, very tender bursal region  Quality: throbbing, cramping, dull ache, discomfort, tight and burning  Relieving factors: rest, change in position, heat and medications (stretching)  Aggravating factors: ambulation, prolonged positioning, stairs, squatting and lifting  Progression: worsening    Social Support  Lives in: multiple-level home    Diagnostic Tests  X-ray: abnormal    Treatments  Previous treatment: medication (laser treatments)  Current treatment: physical therapy  Patient Goals  Patient goal: complete recovery         Objective        Special Questions      Additional Special Questions  Negative responses to lumbar special questions   Lower lumbar scar from previous surgery - thinks it was a fusion but does not think that she has any hardware in place  Also previous scars along thoracodorsal fascia  from bilateral mastectomy 2007      Postural Observations  Seated posture: fair  Standing posture: fair    Additional Postural Observation Details  Level pelvis    Palpation   Left   Hypertonic in the lumbar paraspinals and quadratus lumborum.   Tenderness of the lumbar paraspinals and quadratus lumborum.     Right   Hypertonic in the lumbar paraspinals and quadratus lumborum. Tenderness of the lumbar paraspinals and quadratus lumborum.     Tenderness     Lumbar Spine  Tenderness in the spinous process.     Left Hip   Tenderness in the PSIS.     Right Hip   Tenderness in the PSIS.     Additional Tenderness Details  Considerable tenderness circa left greater trochanter    Neurological Testing     Sensation     Lumbar   Left   Intact: light touch    Right   Intact: light touch    Active Range of Motion   Cervical/Thoracic Spine     Thoracic   Flexion: 100 degrees   Extension: 50 degrees with pain  Left lateral flexion: 75 degrees   Right lateral flexion: 75 degrees   Left rotation: 100 degrees   Right rotation: 100 degrees     Additional Active Range of Motion Details  Measurements are listed as percentages of expected motion, not degrees of motion       Strength/Myotome Testing     Lumbar     Right   Normal strength    Left Hip   Planes of Motion   Flexion: 4  Extension: 4+  Abduction: 4+    Left Knee   Extension: 4 (sharp pain due to arthritis)    Tests     Lumbar     Left   Positive quadrant.   Negative passive SLR.     Right   Negative passive SLR.     Left Pelvic Girdle/Sacrum   Positive: sacral spring.     Lumbar Flexibility Comments:   Decreased flexibility into Both hip IR          Assessment & Plan       Assessment  Impairments: abnormal muscle firing, abnormal muscle tone, abnormal or restricted ROM, activity intolerance and pain with function   Functional limitations: lifting, sleeping, walking, standing and stooping   Assessment details: 74 y.o. female with left lower back and hip pain after playing golf a  few months ago presents with: 1. Intermittent left lower back and buttocks pain, 2. Slightly decreased spinal AROM, 3. Increased pain with standing and walking, 4. History of Left Partial hip replacement, 5. Lumbar spondylosis, 6. Tenderness in left SI region but level pelvis, 7. Trochanteric bursitis with persistent symptoms after multiple laser treatments, 8. Decreased tolerance for many normal ADL's to include walking and shopping  Prognosis: good    Goals  Plan Goals: Short Term Goals: 3 weeks  Patient will be able to tolerate initial exercises  Patient will have pain <5/10  Patient will be able to sleep without pain interruption   Patient will be able to walk for 10 minutes without increased symptoms     Long Term Goals: 6 weeks   Patient will be independent in performing home exercise program.  Patient will have functional pain free spinal and hip AROM  Patient will be able to stand and walk for >20 minutes without increased symptoms  Patient will be able to climb up/down stairs and inclines without increased symptoms     Plan  Therapy options: will be seen for skilled therapy services  Planned modality interventions: ultrasound  Planned therapy interventions: manual therapy, strengthening, stretching, therapeutic activities, spinal/joint mobilization, home exercise program, joint mobilization and neuromuscular re-education  Frequency: 2x week  Duration in visits: 12  Duration in weeks: 6  Treatment plan discussed with: patient  Plan details: Access Code: [email or web address]  URL: [email or web address]  Date: 09/05/2024  Prepared by: Cecelia Parker    Exercises  - Hooklying Single Knee to Chest Stretch  - 1 x daily - 7 x weekly - 1 sets - 3 reps - 20 seconds hold  - Supine Lower Trunk Rotation  - 1 x daily - 7 x weekly - 1 sets - 3 reps - 20 seconds hold  - Supine Piriformis Stretch with Foot on Ground  - 1 x daily - 7 x weekly - 1 sets - 3 reps - 20 seconds hold  - Supine Figure 4 Piriformis Stretch  - 1 x daily  - 7 x weekly - 1 sets - 3 reps - 20 seconds hold  - Supine Figure 4 Piriformis Stretch  - 1 x daily - 7 x weekly - 1 sets - 3 reps - 20 seconds hold  - Hip Flexor Stretch at Edge of Bed  - 1 x daily - 7 x weekly - 1 sets - 3 reps - 20 seconds hold        Manual Therapy:    10     mins  17468;  Therapeutic Exercise:    20     mins  71352;     Neuromuscular [place]:    0    mins  76176;    Therapeutic Activity:     0     mins  28184;     Ultrasound                  __8_  mins  83764  Iontophoresis                 0    mins  59931    Electrical Stimulation     0    mins  [id number] ([id number])  Traction                         _0  mins  30215     Evaluation Time:     20  mins  Timed Treatment:   38   mins   Total Treatment:     65   mins    PT: Cecelia Parker PT     License Number: [place] [id number]  Electronically signed by eCcelia Parker PT, 09/05/24, 12:02 PM EDT    Certification Period: 9/5/2024 thru 12/3/2024  I certify that the therapy services are furnished while this patient is under my care.  The services outlined above are required by this patient, and will be reviewed every 90 days.         Physician Signature:__________________________________________________    PHYSICIAN: Merry Solano APRN      DATE:     Please sign and return via fax to [email or web address] . Thank you, [facility].    PT SIGNATURE: Cecelia Parker PT   [place] LICENSE:  [id number]

## 2024-09-13 ENCOUNTER — TREATMENT (OUTPATIENT)
Dept: PHYSICAL THERAPY | Facility: CLINIC | Age: 74
End: 2024-09-13
Payer: MEDICARE

## 2024-09-13 DIAGNOSIS — M47.816 LUMBAR SPONDYLOSIS: Primary | ICD-10-CM

## 2024-09-13 NOTE — PROGRESS NOTES
Physical Therapy Daily Treatment Note    Visit Diagnoses:    ICD-10-CM ICD-9-CM   1. Lumbar spondylosis  M47.816 721.3       VISIT#: 2      Prema Hoang reports: Her back is hurting quite a bit and feels terrible today. She can't walk very far without a break to stretch. Her hip is very painful too.   Current Pain Level:    0/10; Worst:   7/10 up and walking; Best:  0/10  Affected Area: left posteiror lateral hip and left lower back, very tender bursal region   Quality of Pain: throbbing, cramping, dull ache, discomfort, tight and burning   Functional Deficits/Irritating Factors: ambulation, prolonged positioning, stairs, squatting and lifting   Progression: no changes yet  Compliance with HEP Reported: Yes    Objective  Presents: mild swelling around L GT    Added to Program: Isometric hip abduction, Hip adductor stretch, Pelvic Tilts, Pelvic tilt w/marching     Pt very tender around L GT area. Unable to tolerate much pressure    See Exercise, Manual, and Modality Logs for complete treatment.     Patient Education: Pt was educated on exercise biomechanical correctness, intensity, and speed.     Assessment:  Focused more on L hip today as could visually see edema around the GT and was significantly tender. Also began some core stabilization. Renetta did well with all interventions.  Pt will continue to benefit from skilled PT interventions to address current functional deficits and impairments.     Plan Goals: Short Term Goals: 3 weeks  Patient will be able to tolerate initial exercises  Patient will have pain <5/10  Patient will be able to sleep without pain interruption   Patient will be able to walk for 10 minutes without increased symptoms      Long Term Goals: 6 weeks   Patient will be independent in performing home exercise program.  Patient will have functional pain free spinal and hip AROM  Patient will be able to stand and walk for >20 minutes without increased symptoms  Patient will be able to climb up/down  stairs and inclines without increased symptoms         Plan: Progress to/Continue with current program.       Timed:  Manual Therapy:            20_    mins  81976;  Ultrasound:                     10      mins  62292;   Therapeutic Exercise:    25     mins  14807;     Neuromuscular T[place]:   10     mins  86620;    Therapeutic Activity:      0     mins  51450;      Iontophoresis              _0__   mins  Dry Needling               _0____   mins         Untimed:  Work Conditioning: __0__ mins 46347  Electrical Stimulation:    0    mins  08427 ( );  Mechanical Traction:       0        mins  19738;   Paraffin                       __0___  mins   Ice/Heat: __0__ mins      Timed Treatment:   65   mins   Total Treatment:     65   mins          ADRYAN Doherty License # [id number]  Physical Therapist Assistant

## 2024-09-16 ENCOUNTER — TRANSCRIBE ORDERS (OUTPATIENT)
Dept: ADMINISTRATIVE | Facility: HOSPITAL | Age: 74
End: 2024-09-16
Payer: MEDICARE

## 2024-09-16 ENCOUNTER — TREATMENT (OUTPATIENT)
Dept: PHYSICAL THERAPY | Facility: CLINIC | Age: 74
End: 2024-09-16
Payer: MEDICARE

## 2024-09-16 DIAGNOSIS — M47.816 LUMBAR SPONDYLOSIS: Primary | ICD-10-CM

## 2024-09-16 DIAGNOSIS — M81.0 SENILE OSTEOPOROSIS: Primary | ICD-10-CM

## 2024-09-16 PROCEDURE — 97140 MANUAL THERAPY 1/> REGIONS: CPT | Performed by: PHYSICAL THERAPIST

## 2024-09-16 PROCEDURE — 97035 APP MDLTY 1+ULTRASOUND EA 15: CPT | Performed by: PHYSICAL THERAPIST

## 2024-09-16 PROCEDURE — 97110 THERAPEUTIC EXERCISES: CPT | Performed by: PHYSICAL THERAPIST

## 2024-09-25 ENCOUNTER — TREATMENT (OUTPATIENT)
Dept: PHYSICAL THERAPY | Facility: CLINIC | Age: 74
End: 2024-09-25
Payer: MEDICARE

## 2024-09-25 DIAGNOSIS — M47.816 LUMBAR SPONDYLOSIS: Primary | ICD-10-CM

## 2024-10-28 RX ORDER — ZOLEDRONIC ACID 0.05 MG/ML
5 INJECTION, SOLUTION INTRAVENOUS ONCE
Status: CANCELLED | OUTPATIENT
Start: 2024-10-31

## 2024-10-31 ENCOUNTER — HOSPITAL ENCOUNTER (OUTPATIENT)
Dept: INFUSION THERAPY | Facility: HOSPITAL | Age: 74
Discharge: HOME | End: 2024-10-31
Payer: MEDICARE

## 2024-10-31 VITALS
SYSTOLIC BLOOD PRESSURE: 141 MMHG | HEIGHT: 65 IN | TEMPERATURE: 97.7 F | DIASTOLIC BLOOD PRESSURE: 81 MMHG | WEIGHT: 153 LBS | HEART RATE: 73 BPM | BODY MASS INDEX: 25.49 KG/M2 | RESPIRATION RATE: 16 BRPM | OXYGEN SATURATION: 100 %

## 2024-10-31 DIAGNOSIS — M81.0 SENILE OSTEOPOROSIS: Primary | ICD-10-CM

## 2024-10-31 LAB
CALCIUM SPEC-SCNC: 9.1 MG/DL (ref 8.6–10.5)
CREAT SERPL-MCNC: 0.74 MG/DL (ref 0.57–1)
EGFRCR SERPLBLD CKD-EPI 2021: 84.5 ML/MIN/1.73

## 2024-10-31 PROCEDURE — 25010000002 ZOLEDRONIC ACID 5 MG/100ML SOLUTION: Performed by: STUDENT IN AN ORGANIZED HEALTH CARE EDUCATION/TRAINING PROGRAM

## 2024-10-31 PROCEDURE — 96374 THER/PROPH/DIAG INJ IV PUSH: CPT

## 2024-10-31 PROCEDURE — 82310 ASSAY OF CALCIUM: CPT | Performed by: STUDENT IN AN ORGANIZED HEALTH CARE EDUCATION/TRAINING PROGRAM

## 2024-10-31 PROCEDURE — 36415 COLL VENOUS BLD VENIPUNCTURE: CPT

## 2024-10-31 PROCEDURE — 82565 ASSAY OF CREATININE: CPT | Performed by: STUDENT IN AN ORGANIZED HEALTH CARE EDUCATION/TRAINING PROGRAM

## 2024-10-31 RX ORDER — ZOLEDRONIC ACID 0.05 MG/ML
5 INJECTION, SOLUTION INTRAVENOUS ONCE
Status: COMPLETED | OUTPATIENT
Start: 2024-10-31 | End: 2024-10-31

## 2024-10-31 RX ORDER — ZOLEDRONIC ACID 0.05 MG/ML
5 INJECTION, SOLUTION INTRAVENOUS ONCE
Status: CANCELLED | OUTPATIENT
Start: 2024-10-31

## 2024-10-31 RX ADMIN — ZOLEDRONIC ACID 5 MG: 5 INJECTION INTRAVENOUS at 10:12

## 2025-04-30 ENCOUNTER — OFFICE VISIT (OUTPATIENT)
Dept: FAMILY MEDICINE CLINIC | Facility: CLINIC | Age: 75
End: 2025-04-30
Payer: MEDICARE

## 2025-04-30 VITALS
HEART RATE: 73 BPM | BODY MASS INDEX: 24.83 KG/M2 | SYSTOLIC BLOOD PRESSURE: 136 MMHG | WEIGHT: 149 LBS | HEIGHT: 65 IN | OXYGEN SATURATION: 100 % | DIASTOLIC BLOOD PRESSURE: 88 MMHG

## 2025-04-30 DIAGNOSIS — R29.90 STROKE-LIKE SYMPTOMS: ICD-10-CM

## 2025-04-30 DIAGNOSIS — R82.81 PYURIA: Primary | ICD-10-CM

## 2025-04-30 RX ORDER — MONTELUKAST SODIUM 10 MG/1
10 TABLET ORAL EVERY EVENING
COMMUNITY

## 2025-04-30 RX ORDER — PANTOPRAZOLE SODIUM 40 MG/1
40 TABLET, DELAYED RELEASE ORAL 2 TIMES DAILY
COMMUNITY

## 2025-04-30 NOTE — PROGRESS NOTES
"Chief Complaint  Hospital Follow Up Visit    Subjective        Prema Hoang presents to [facility]       The patient is a 74-year-old female who presents to the clinic for concerns of stroke.    She was evaluated in the emergency room on Monday, 04/28/2025, due to difficulties with word finding and a sensation of brain fog. She also reports challenges in navigating her phone. These symptoms have remained consistent since their onset over the weekend. She reports no systemic symptoms such as fevers or chills, gastrointestinal symptoms like nausea, vomiting, or diarrhea, urinary symptoms including dysuria, frequency, or urgency, balance issues, or vertigo. She has no history of myocardial infarction and was not advised to take baby aspirin by her cardiologist. She is currently on rosuvastatin and Toprol XL once daily but has discontinued meloxicam in preparation for an upcoming hip surgery scheduled for Monday.    MEDICATIONS  rosuvastatin, Toprol XL, meloxicam (discontinued)    History of Present Illness    Objective   Vital Signs:  /88   Pulse 73   Ht 165.1 cm (65\")   Wt 67.6 kg (149 lb)   SpO2 100%   BMI 24.79 kg/m²   Estimated body mass index is 24.79 kg/m² as calculated from the following:    Height as of this encounter: 165.1 cm (65\").    Weight as of this encounter: 67.6 kg (149 lb).    BMI is within normal parameters. No other follow-up for BMI required.      Physical Exam  Constitutional:       Appearance: Normal appearance.   HENT:      Head: Normocephalic and atraumatic.      Nose: Nose normal.      Mouth/Throat:      Mouth: Mucous membranes are moist.   Eyes:      General: Lids are normal.      Conjunctiva/sclera: Conjunctivae normal.   Cardiovascular:      Rate and Rhythm: Normal rate and regular rhythm.      Heart sounds: Normal heart sounds.   Pulmonary:      Effort: Pulmonary effort is normal.      Breath sounds: Normal breath sounds.   Musculoskeletal:      " "Cervical back: Normal range of motion.   Skin:     General: Skin is warm and dry.   Neurological:      General: No focal deficit present.      Mental Status: She is alert and oriented to person, place, and time.      Cranial Nerves: No cranial nerve deficit.      Coordination: Coordination normal.      Gait: Gait is intact. Gait normal.      Comments: Concentration impaired - could not spell \"world\" backward. Judgement, short term recall intact.   Psychiatric:         Mood and Affect: Mood normal.         Behavior: Behavior normal.         Thought Content: Thought content normal.        Result Review :               Results  Laboratory Studies  Urine test showed some leukocytes.       Assessment and Plan        1. Suspected cerebrovascular accident.  She reports word-finding difficulty and brain fog, which have remained the same since the weekend. A stat MRI has been ordered to further investigate the cause of her symptoms. Additionally, a urine sample will be collected to rule out a urinary tract infection that could be contributing to her symptoms.    2. Leukocyturia.  Her previous urine test showed leukocytes, indicating a possible infection. A repeat urine test and culture will be conducted to determine if the leukocytes have cleared up or if an infection is present.    Diagnoses and all orders for this visit:    1. Pyuria (Primary)  -     POCT urinalysis dipstick, automated  -     Urine Culture - Urine, Urine, Clean Catch    2. Stroke-like symptoms  -     MRI Brain With & Without Contrast; Future             Follow Up   No follow-ups on file.  Patient was given instructions and counseling regarding her condition or for health maintenance advice. Please see specific information pulled into the AVS if appropriate.     Patient or patient representative verbalized consent for the use of Ambient Listening during the visit with  Lia Tanner MD for chart documentation. 4/30/2025  14:16 EDT            "

## 2025-05-01 ENCOUNTER — TELEPHONE (OUTPATIENT)
Dept: INTERNAL MEDICINE | Facility: CLINIC | Age: 75
End: 2025-05-01

## 2025-05-01 ENCOUNTER — RESULTS FOLLOW-UP (OUTPATIENT)
Dept: FAMILY MEDICINE CLINIC | Facility: CLINIC | Age: 75
End: 2025-05-01
Payer: MEDICARE

## 2025-05-01 ENCOUNTER — HOSPITAL ENCOUNTER (OUTPATIENT)
Dept: MRI IMAGING | Facility: HOSPITAL | Age: 75
Discharge: HOME | End: 2025-05-01
Admitting: FAMILY MEDICINE
Payer: MEDICARE

## 2025-05-01 DIAGNOSIS — R29.90 STROKE-LIKE SYMPTOMS: ICD-10-CM

## 2025-05-01 DIAGNOSIS — I63.9 CEREBROVASCULAR ACCIDENT (CVA), UNSPECIFIED MECHANISM: Primary | ICD-10-CM

## 2025-05-01 DIAGNOSIS — G93.89 MASS, BRAIN: Primary | ICD-10-CM

## 2025-05-01 PROCEDURE — A9579 GAD-BASE MR CONTRAST NOS,1ML: HCPCS | Performed by: FAMILY MEDICINE

## 2025-05-01 PROCEDURE — 70553 MRI BRAIN STEM W/O & W/DYE: CPT

## 2025-05-01 PROCEDURE — 25510000001 GADOPICLENOL 0.5 MMOL/ML SOLUTION: Performed by: FAMILY MEDICINE

## 2025-05-01 RX ORDER — ASPIRIN 81 MG/1
81 TABLET ORAL DAILY
Qty: 90 TABLET | Refills: 1 | Status: SHIPPED | OUTPATIENT
Start: 2025-05-01

## 2025-05-01 RX ADMIN — GADOPICLENOL 6 ML: 485.1 INJECTION INTRAVENOUS at 10:23

## 2025-05-01 NOTE — H&P
Called pt, verified full name and .    Notified of findings of stroke on MRI brain.     Start DAPT, urgent referral placed to neurology.    Pt refused clopidogrel due to acquaintances with GI bleeding, OK with brilinta. She has no personal history of GI bleeding.

## 2025-05-01 NOTE — TELEPHONE ENCOUNTER
"  Caller: Prema Hoang \"CARLOS\"    Relationship: Self    Best call back number: [phone]       Who are you requesting to speak with (clinical staff, provider,  specific staff member): DR GUZMAN      What was the call regarding: PATIENT WOULD LIKE TO SPEAK WITH DR GUZMAN     PATIENT STATES SHE WOULD LIKE TO BE ACCEPTED AS HIS PATIENT    PATIENT STATES SHE WOULD LIKE TO DISCUSS HER STROKE WITH HIM       "

## 2025-05-07 ENCOUNTER — READMISSION MANAGEMENT (OUTPATIENT)
Dept: CALL CENTER | Facility: HOSPITAL | Age: 75
End: 2025-05-07
Payer: MEDICARE

## 2025-05-07 ENCOUNTER — TELEPHONE (OUTPATIENT)
Dept: INTERNAL MEDICINE | Facility: CLINIC | Age: 75
End: 2025-05-07
Payer: MEDICARE

## 2025-05-07 NOTE — TELEPHONE ENCOUNTER
This patient called an stated she used to be a patient of Dr. Malik and that she was wanting to see if he would take her on as a patient again. She stated that she is related to him. She di just get released from the hospital today for a stroke and  they were telling her to follow up with her doctor within the next week or so. Is it okay to scheduled her as a new patient/ for a hospital follow up? Please advise

## 2025-05-07 NOTE — OUTREACH NOTE
Prep Survey      Flowsheet Row Responses   [facility] facility patient discharged from? Non-[facility]   Is LACE score < 7 ? Non- Discharge   Eligibility [facility]   Date of Admission 05/05/25   Date of Discharge 05/07/25   Discharge Disposition Home or Self Care   Discharge diagnosis Dizziness (Primary Dx),  Left basal ganglia stroke,  Nonintractable headache, unspecified chronicity pattern, unspecified headache type,  Nausea and vomiting,   Does the patient have one of the following disease processes/diagnoses(primary or secondary)? Stroke   Prep survey completed? Yes            Jeaneth ARMANDO - Registered Nurse

## 2025-05-08 ENCOUNTER — TRANSITIONAL CARE MANAGEMENT TELEPHONE ENCOUNTER (OUTPATIENT)
Dept: CALL CENTER | Facility: HOSPITAL | Age: 75
End: 2025-05-08
Payer: MEDICARE

## 2025-05-08 NOTE — OUTREACH NOTE
Call Center TCM Note      Flowsheet Row Responses   [facility] patient discharged from? Non-  [[place]]   Does the patient have one of the following disease processes/diagnoses(primary or secondary)? Stroke   TCM attempt successful? Yes   Call end time 1018   Discharge diagnosis Dizziness (Primary Dx),  Left basal ganglia stroke,  Nonintractable headache, unspecified chronicity pattern, unspecified headache type,  Nausea and vomiting,   Meds reviewed with patient/caregiver? Yes   Is the patient having any side effects they believe may be caused by any medication additions or changes? No   Does the patient have all medications ordered at discharge? Yes   Is the patient taking all medications as directed (includes completed medication regime)? Yes   Does the patient have an appointment with their PCP within 7-14 days of discharge? No   Nursing Interventions Patient declined scheduling/rescheduling appointment at this time   Psychosocial issues? No   Did the patient receive a copy of their discharge instructions? Yes   Nursing interventions Reviewed instructions with patient   What is the patient's perception of their health status since discharge? Improving   Is the patient/caregiver able to teach back signs and symptoms related to disease process for when to call PCP? Yes   Is the patient/caregiver able to teach back signs and symptoms related to disease process for when to call 911? Yes   Is the patient/caregiver able to teach back the hierarchy of who to call/visit for symptoms/problems? PCP, Specialist, Home health nurse, Urgent Care, ED, 911 Yes   If the patient is a current smoker, are they able to teach back resources for cessation? Not a smoker   Additional teach back comments States she is doing well.  They went over BEFAST in the hospital and explained everything to her.  Will monitor bp.  Says she had no issues and denies any weakness.   TCM call completed? Yes   Wrap up additional comments Denies  questions or needs at this time and declined f/u appt with PCP.   Call end time 1018            Karo YANCEY - Licensed Nurse    5/8/2025, 10:20 EDT

## 2025-05-08 NOTE — TELEPHONE ENCOUNTER
Lito called back and she stated that when her  was in to see Dr. Joel that he had told him he would see lito and to have her give  a call to schedule . I went ahead and put her on the schedule for a hospital follow up for Monday I just wanted to double check and make sure that was okay

## 2025-05-12 ENCOUNTER — OFFICE VISIT (OUTPATIENT)
Dept: INTERNAL MEDICINE | Facility: CLINIC | Age: 75
End: 2025-05-12
Payer: MEDICARE

## 2025-05-12 VITALS
BODY MASS INDEX: 24.79 KG/M2 | WEIGHT: 148.8 LBS | HEIGHT: 65 IN | DIASTOLIC BLOOD PRESSURE: 80 MMHG | HEART RATE: 81 BPM | SYSTOLIC BLOOD PRESSURE: 122 MMHG | OXYGEN SATURATION: 98 % | TEMPERATURE: 97 F | RESPIRATION RATE: 16 BRPM

## 2025-05-12 DIAGNOSIS — I63.81 CEREBROVASCULAR ACCIDENT (CVA) OF LEFT BASAL GANGLIA: ICD-10-CM

## 2025-05-12 DIAGNOSIS — I10 PRIMARY HYPERTENSION: ICD-10-CM

## 2025-05-12 DIAGNOSIS — R07.89 LEFT-SIDED CHEST WALL PAIN: Primary | ICD-10-CM

## 2025-05-12 DIAGNOSIS — E78.2 MIXED HYPERLIPIDEMIA: ICD-10-CM

## 2025-05-12 DIAGNOSIS — R47.01: ICD-10-CM

## 2025-05-12 RX ORDER — CLOPIDOGREL BISULFATE 75 MG/1
75 TABLET ORAL DAILY
COMMUNITY
Start: 2025-05-06

## 2025-05-12 RX ORDER — METOPROLOL SUCCINATE 50 MG/1
50 TABLET, EXTENDED RELEASE ORAL DAILY
COMMUNITY
Start: 2025-05-09

## 2025-05-12 RX ORDER — CHLORAL HYDRATE 500 MG
CAPSULE ORAL
COMMUNITY

## 2025-05-12 NOTE — PROGRESS NOTES
Transitional Care Follow Up Visit  Subjective     Prema Hoang is a 74 y.o. female who presents for a transitional care management visit.    Within 48 business hours after discharge our office contacted her via telephone to coordinate her care and needs.      I reviewed and discussed the details of that call along with the discharge summary, hospital problems, inpatient lab results, inpatient diagnostic studies, and consultation reports with Prema.     Current outpatient and discharge medications have been reconciled for the patient.  Reviewed by: Tony Joel MD (Tony)          5/7/2025    12:59 PM   Date of TCM Phone Call   [facility]   Date of Admission 5/5/2025   Date of Discharge 5/7/2025   Discharge Disposition Home or Self Care     Risk for Readmission (LACE) No data recorded    History of Present Illness   Course During Hospital Stay: Patient was admitted to the hospital on 5/5/2025 and discharged on 5/7/2025 with lacunar left basilar ganglia stroke.  Intermittent headache and dizziness.  She had a motor aphasia which is getting better.  No receptive difficulties.  No motor deficits otherwise.  She was sent home without restrictions and has speech therapy scheduled.  She does complain of some positional headaches that are transient.  The dizziness is actually gotten better more recently.  She is on baby aspirin and Plavix.  No evidence of atrial fibrillation noted and no thrombus on echocardiogram.  CTA neck and brain normal.  MRI showed small lacunar infarct in the left basal ganglia and.     The following portions of the patient's history were reviewed and updated as appropriate: allergies, current medications, past family history, past medical history, past social history, past surgical history, and problem list.    Review of Systems  Headaches with position change very transient and probably better.  Vertigo symptoms are getting better.  Motor speech function improving.  She  "does complain of some pain in the anterior ribs bilaterally and feels like she notices nodules in the ribs  Objective   /80 (BP Location: Right arm, Patient Position: Sitting, Cuff Size: Large Adult)   Pulse 81   Temp 97 °F (36.1 °C) (Temporal)   Resp 16   Ht 165.1 cm (65\")   Wt 67.5 kg (148 lb 12.8 oz)   SpO2 98%   BMI 24.76 kg/m²   Physical Exam  Vitals and nursing note reviewed.   Constitutional:       Appearance: Normal appearance.   HENT:      Head: Normocephalic and atraumatic.   Cardiovascular:      Rate and Rhythm: Normal rate and regular rhythm.   Pulmonary:      Effort: Pulmonary effort is normal.      Breath sounds: Normal breath sounds.   Chest:      Comments: Lower anterior ribs somewhat tender to palpation.  Maybe a little swelling over each area bilaterally but no definitive mass  Abdominal:      General: Abdomen is flat.      Palpations: Abdomen is soft.   Musculoskeletal:         General: No swelling or deformity.      Cervical back: Neck supple.      Right lower leg: No edema.      Left lower leg: No edema.   Skin:     General: Skin is warm.      Capillary Refill: Capillary refill takes less than 2 seconds.      Findings: No rash.   Neurological:      General: No focal deficit present.      Mental Status: She is alert and oriented to person, place, and time.   Psychiatric:         Mood and Affect: Mood normal.         Behavior: Behavior normal.         Thought Content: Thought content normal.         Judgment: Judgment normal.      Comments: Her speech is a little slower than normal but normal receptive language.         Assessment & Plan   Diagnoses and all orders for this visit:    1. Left-sided chest wall pain (Primary)  -     XR Chest PA & Lateral; Future    2. Cerebrovascular accident (CVA) of left basal ganglia    3. Primary hypertension    4. Mixed hyperlipidemia    5. Motor aphasia      Left basal ganglia CVA with normal CTA brain and neck and normal echocardiogram.  No " evidence of atrial fibrillation.  Continue with the baby aspirin Plavix and maximize blood pressure and cholesterol management.  She definitely improving but still some difference in her motor speech function.  It is improving and she has speech therapy follow-up.  Rib pain bilaterally in the lower anterior ribs.  Chest x-ray to rule out any bony abnormality.  Follow-up in about 4 weeks or sooner if any problems

## 2025-05-15 ENCOUNTER — HOSPITAL ENCOUNTER (OUTPATIENT)
Dept: GENERAL RADIOLOGY | Facility: HOSPITAL | Age: 75
Discharge: HOME | End: 2025-05-15
Admitting: INTERNAL MEDICINE
Payer: MEDICARE

## 2025-05-15 DIAGNOSIS — R07.89 LEFT-SIDED CHEST WALL PAIN: ICD-10-CM

## 2025-05-15 PROCEDURE — 71046 X-RAY EXAM CHEST 2 VIEWS: CPT

## 2025-05-22 NOTE — TELEPHONE ENCOUNTER
I'm glad that the patient can find a PCP in the [facility] system.  Thank you   Alert and oriented to person, place and time/Patient baseline mental status/Awake

## 2025-06-02 ENCOUNTER — TELEPHONE (OUTPATIENT)
Dept: INTERNAL MEDICINE | Facility: CLINIC | Age: 75
End: 2025-06-02

## 2025-06-02 RX ORDER — ROSUVASTATIN CALCIUM 20 MG/1
20 TABLET, COATED ORAL DAILY
Qty: 90 TABLET | Refills: 3 | Status: SHIPPED | OUTPATIENT
Start: 2025-06-02

## 2025-06-02 NOTE — TELEPHONE ENCOUNTER
"Caller: Prema Hoang DEBI \"CARLOS\"    Relationship: Self    Best call back number: [phone]     What medication are you requesting:   rosuvastatin (CRESTOR) 20** MG tablet     WHEN HOSPITALIZED, THEY UPPED IT TO THIS. PATIENT WOULD LIKE A CALL BACK TO DISCUSS IF THAT IS A NEW LONG TERM THING, OR SHOULD SHE DO SOMETHING DIFFERENT. PLEASE CALL TO DISCUSS.      If a prescription is needed, what is your preferred pharmacy and phone number: [id number] [id number] AT [facility] [phone] [facility] [phone] FX     "

## 2025-06-16 ENCOUNTER — OFFICE VISIT (OUTPATIENT)
Dept: INTERNAL MEDICINE | Facility: CLINIC | Age: 75
End: 2025-06-16
Payer: MEDICARE

## 2025-06-16 VITALS
WEIGHT: 149.2 LBS | HEART RATE: 92 BPM | SYSTOLIC BLOOD PRESSURE: 114 MMHG | OXYGEN SATURATION: 99 % | RESPIRATION RATE: 16 BRPM | HEIGHT: 65 IN | TEMPERATURE: 97.7 F | DIASTOLIC BLOOD PRESSURE: 70 MMHG | BODY MASS INDEX: 24.86 KG/M2

## 2025-06-16 DIAGNOSIS — E78.49 OTHER HYPERLIPIDEMIA: ICD-10-CM

## 2025-06-16 DIAGNOSIS — I63.81 CEREBROVASCULAR ACCIDENT (CVA) OF LEFT BASAL GANGLIA: Primary | ICD-10-CM

## 2025-06-16 DIAGNOSIS — I10 PRIMARY HYPERTENSION: ICD-10-CM

## 2025-06-16 PROCEDURE — 1126F AMNT PAIN NOTED NONE PRSNT: CPT | Performed by: INTERNAL MEDICINE

## 2025-06-16 PROCEDURE — 3074F SYST BP LT 130 MM HG: CPT | Performed by: INTERNAL MEDICINE

## 2025-06-16 PROCEDURE — 99214 OFFICE O/P EST MOD 30 MIN: CPT | Performed by: INTERNAL MEDICINE

## 2025-06-16 PROCEDURE — 3078F DIAST BP <80 MM HG: CPT | Performed by: INTERNAL MEDICINE

## 2025-06-16 PROCEDURE — G2211 COMPLEX E/M VISIT ADD ON: HCPCS | Performed by: INTERNAL MEDICINE

## 2025-06-16 RX ORDER — ROSUVASTATIN CALCIUM 20 MG/1
20 TABLET, COATED ORAL DAILY
Qty: 90 TABLET | Refills: 3 | Status: SHIPPED | OUTPATIENT
Start: 2025-06-16

## 2025-06-16 RX ORDER — FAMOTIDINE 40 MG/1
40 TABLET, FILM COATED ORAL NIGHTLY
COMMUNITY
Start: 2025-06-10

## 2025-06-16 RX ORDER — CLOTRIMAZOLE AND BETAMETHASONE DIPROPIONATE 10; .64 MG/G; MG/G
CREAM TOPICAL
COMMUNITY

## 2025-06-16 RX ORDER — MELOXICAM 15 MG/1
15 TABLET ORAL DAILY
COMMUNITY

## 2025-06-16 RX ORDER — IPRATROPIUM BROMIDE 42 UG/1
SPRAY, METERED NASAL
COMMUNITY
Start: 2025-06-10

## 2025-06-16 NOTE — PROGRESS NOTES
"Chief Complaint  Follow-up    Subjective        Prema Hoang presents to [facility]  History of Present Illness  Follow-up upon CVA.  Motor aphasia is better.  Still some difficulty with word finding but she feels like she doing much better.  She is off the Plavix.  Recently started back on meloxicam due to arthritic pain.  Objective   Vital Signs:  /70 (BP Location: Left arm, Patient Position: Sitting, Cuff Size: Large Adult)   Pulse 92   Temp 97.7 °F (36.5 °C) (Temporal)   Resp 16   Ht 165.1 cm (65\")   Wt 67.7 kg (149 lb 3.2 oz)   SpO2 99%   BMI 24.83 kg/m²   Estimated body mass index is 24.83 kg/m² as calculated from the following:    Height as of this encounter: 165.1 cm (65\").    Weight as of this encounter: 67.7 kg (149 lb 3.2 oz).    BMI is within normal parameters. No other follow-up for BMI required.      Physical Exam  Psychiatric:         Mood and Affect: Mood normal.         Behavior: Behavior normal.         Thought Content: Thought content normal.         Judgment: Judgment normal.        Result Review :    CMP          7/10/2024    13:02 7/18/2024    09:46 10/31/2024    09:34   CMP   Glucose 116  122     BUN 12  13     Creatinine 0.79  0.83  0.74    EGFR 78  73.6  84.5    Sodium 141  140     Potassium 5.3  5.3     Chloride 102  106     Calcium 10.6  10.3  9.1    Total Protein 6.9  6.8     Albumin 4.5  4.2     Globulin 2.4  2.6     Total Bilirubin 0.7  0.5     Alkaline Phosphatase 83  85     AST (SGOT) 37  35     ALT (SGPT) 26  27     Albumin/Globulin Ratio  1.6     BUN/Creatinine Ratio 15  15.7     Anion Gap  7.6                     Assessment and Plan   Diagnoses and all orders for this visit:    1. Cerebrovascular accident (CVA) of left basal ganglia (Primary)    2. Primary hypertension    3. Other hyperlipidemia    Other orders  -     rosuvastatin (Crestor) 20 MG tablet; Take 1 tablet by mouth Daily.  Dispense: 90 tablet; Refill: " 3    Left basal ganglia/thalamic lacunar infarct with improved motor aphasia.  Still having some difficulty with word finding but overall much better it sounds like.  Off the Plavix and on baby aspirin.  Blood pressure looks great today.  Continue risk modification.  Recheck in 2 months we will repeat lab work at that time.  Sooner if any problems         Follow Up   Return in about 2 months (around 8/16/2025).  Patient was given instructions and counseling regarding her condition or for health maintenance advice. Please see specific information pulled into the AVS if appropriate.

## 2025-08-01 ENCOUNTER — HOSPITAL ENCOUNTER (OUTPATIENT)
Dept: MRI IMAGING | Facility: HOSPITAL | Age: 75
Discharge: HOME | End: 2025-08-01
Payer: MEDICARE

## 2025-08-01 DIAGNOSIS — G93.89 MASS, BRAIN: ICD-10-CM

## 2025-08-01 PROCEDURE — 70553 MRI BRAIN STEM W/O & W/DYE: CPT

## 2025-08-01 PROCEDURE — 25510000002 GADOBENATE DIMEGLUMINE 529 MG/ML SOLUTION: Performed by: FAMILY MEDICINE

## 2025-08-01 PROCEDURE — A9577 INJ MULTIHANCE: HCPCS | Performed by: FAMILY MEDICINE

## 2025-08-01 PROCEDURE — 76014 MR SFTY IMPLT&/FB ASMT STF 1: CPT

## 2025-08-01 RX ADMIN — GADOBENATE DIMEGLUMINE 13 ML: 529 INJECTION, SOLUTION INTRAVENOUS at 15:05

## 2025-08-19 ENCOUNTER — OFFICE VISIT (OUTPATIENT)
Dept: INTERNAL MEDICINE | Facility: CLINIC | Age: 75
End: 2025-08-19
Payer: MEDICARE

## 2025-08-19 VITALS
HEART RATE: 78 BPM | WEIGHT: 143 LBS | SYSTOLIC BLOOD PRESSURE: 118 MMHG | BODY MASS INDEX: 23.82 KG/M2 | DIASTOLIC BLOOD PRESSURE: 72 MMHG | OXYGEN SATURATION: 95 % | HEIGHT: 65 IN | RESPIRATION RATE: 16 BRPM | TEMPERATURE: 96.8 F

## 2025-08-19 DIAGNOSIS — D32.9 MENINGIOMA: ICD-10-CM

## 2025-08-19 DIAGNOSIS — R47.01: ICD-10-CM

## 2025-08-19 DIAGNOSIS — I63.81 CEREBROVASCULAR ACCIDENT (CVA) OF LEFT BASAL GANGLIA: Primary | ICD-10-CM

## 2025-08-19 DIAGNOSIS — R35.0 URINE FREQUENCY: ICD-10-CM

## 2025-08-19 LAB
BILIRUB BLD-MCNC: NEGATIVE MG/DL
CLARITY, POC: CLEAR
COLOR UR: YELLOW
EXPIRATION DATE: ABNORMAL
GLUCOSE UR STRIP-MCNC: NEGATIVE MG/DL
KETONES UR QL: NEGATIVE
LEUKOCYTE EST, POC: ABNORMAL
Lab: ABNORMAL
NITRITE UR-MCNC: NEGATIVE MG/ML
PH UR: 5 [PH] (ref 5–8)
PROT UR STRIP-MCNC: NEGATIVE MG/DL
RBC # UR STRIP: ABNORMAL /UL
SP GR UR: 1.01 (ref 1–1.03)
UROBILINOGEN UR QL: NORMAL

## 2025-08-19 PROCEDURE — 3078F DIAST BP <80 MM HG: CPT | Performed by: INTERNAL MEDICINE

## 2025-08-19 PROCEDURE — 81003 URINALYSIS AUTO W/O SCOPE: CPT | Performed by: INTERNAL MEDICINE

## 2025-08-19 PROCEDURE — 3074F SYST BP LT 130 MM HG: CPT | Performed by: INTERNAL MEDICINE

## 2025-08-19 PROCEDURE — 1126F AMNT PAIN NOTED NONE PRSNT: CPT | Performed by: INTERNAL MEDICINE

## 2025-08-19 PROCEDURE — 99214 OFFICE O/P EST MOD 30 MIN: CPT | Performed by: INTERNAL MEDICINE

## 2025-08-21 LAB
BACTERIA UR CULT: NO GROWTH
BACTERIA UR CULT: NORMAL

## 2025-08-27 RX ORDER — DOXYCYCLINE 100 MG/1
100 CAPSULE ORAL 2 TIMES DAILY
Qty: 14 CAPSULE | Refills: 0 | Status: SHIPPED | OUTPATIENT
Start: 2025-08-27

## (undated) DEVICE — TROCAR: Brand: KII® SLEEVE

## (undated) DEVICE — DRSNG WND GZ CURAD OIL EMULSION 3X8IN LF STRL 1PK

## (undated) DEVICE — INTEGUSEAL MICROBIAL SEALANT: Brand: AVANOS

## (undated) DEVICE — LAPAROSCOPIC SMOKE FILTRATION SYSTEM: Brand: PALL LAPAROSHIELD® PLUS LAPAROSCOPIC SMOKE FILTRATION SYSTEM

## (undated) DEVICE — ANTIBACTERIAL UNDYED BRAIDED (POLYGLACTIN 910), SYNTHETIC ABSORBABLE SUTURE: Brand: COATED VICRYL

## (undated) DEVICE — APPL CHLORAPREP HI/LITE 26ML ORNG

## (undated) DEVICE — PENCL ES MEGADINE EZ/CLEAN BUTN W/HOLSTR 10FT

## (undated) DEVICE — PIN DRL NOHEAD TROC 3.2X75MM

## (undated) DEVICE — SCRW HEX PERSONA 3.5X3.2X33MM
Type: IMPLANTABLE DEVICE | Site: KNEE | Status: NON-FUNCTIONAL
Removed: 2020-08-28

## (undated) DEVICE — ELECTRD BLD MEGADYNE 2.75IN SS

## (undated) DEVICE — LAPAROSCOPIC TROCAR SLEEVE/SINGLE USE: Brand: KII® OPTICAL ACCESS SYSTEM

## (undated) DEVICE — BNDG ELAS CO-FLEX SLF ADHR 6IN 5YD LF STRL

## (undated) DEVICE — GLV SURG PREMIERPRO ORTHO LTX PF SZ8 BRN

## (undated) DEVICE — PK KN TOTL 40

## (undated) DEVICE — TRAP FLD MINIVAC MEGADYNE 100ML

## (undated) DEVICE — STERILE PATIENT PROTECTIVE PAD FOR IMP® KNEE POSITIONERS & COHESIVE WRAP (10 / CASE): Brand: DE MAYO KNEE POSITIONER®

## (undated) DEVICE — PATIENT RETURN ELECTRODE, SINGLE-USE, CONTACT QUALITY MONITORING, ADULT, WITH 9FT CORD, FOR PATIENTS WEIGING OVER 33LBS. (15KG): Brand: MEGADYNE

## (undated) DEVICE — METZENBAUM SCISSOR TIP, DISPOSABLE: Brand: RENEW

## (undated) DEVICE — BNDG ADHS PLSTC 1X3IN LF

## (undated) DEVICE — DUAL CUT SAGITTAL BLADE

## (undated) DEVICE — SUT MNCRYL 4/0 PS2 18 IN

## (undated) DEVICE — ENDOPATH XCEL BLUNT TIP TROCARS WITH SMOOTH SLEEVES: Brand: ENDOPATH XCEL

## (undated) DEVICE — GLV SURG SIGNATURE ESSENTIAL PF LTX SZ8

## (undated) DEVICE — TBG INSUFL W FLTR STRL

## (undated) DEVICE — SUT ETHIB 0/0 MO6 I8IN CX45D

## (undated) DEVICE — MAT FLR ABSORBENT LG 4FT 10 2.5FT

## (undated) DEVICE — PENCL E/S ULTRAVAC TELESCP NOSE HOLSTR 10FT

## (undated) DEVICE — BNDG ELAS ELITE V/CLOSE 6IN 5YD LF STRL

## (undated) DEVICE — PK LAP GEN 90

## (undated) DEVICE — BNDG ADHS CURAD FLX/FABRC 2X4IN STRL LF

## (undated) DEVICE — Device: Brand: CAUTERY TIP CLEANER

## (undated) DEVICE — UNDERCAST PADDING: Brand: DEROYAL

## (undated) DEVICE — GLV SURG SIGNATURE ESSENTIAL PF LTX SZ8.5

## (undated) DEVICE — OPTIFOAM GENTLE SA, POSTOP, 4X12: Brand: MEDLINE

## (undated) DEVICE — ENDOPATH 5MM ENDOSCOPIC BLUNT TIP DISSECTORS (12 POUCHES CONTAINING 3 DISSECTORS EACH): Brand: ENDOPATH